# Patient Record
Sex: MALE | Race: WHITE | HISPANIC OR LATINO | Employment: OTHER | URBAN - METROPOLITAN AREA
[De-identification: names, ages, dates, MRNs, and addresses within clinical notes are randomized per-mention and may not be internally consistent; named-entity substitution may affect disease eponyms.]

---

## 2017-02-07 RX ORDER — BLOOD SUGAR DIAGNOSTIC
STRIP MISCELLANEOUS
Qty: 150 STRIP | Refills: 4 | Status: SHIPPED | OUTPATIENT
Start: 2017-02-07 | End: 2017-02-08 | Stop reason: CLARIF

## 2017-03-14 RX ORDER — LISINOPRIL 40 MG/1
TABLET ORAL
Qty: 90 TAB | Refills: 0 | Status: SHIPPED | OUTPATIENT
Start: 2017-03-14 | End: 2017-05-30 | Stop reason: SDUPTHER

## 2017-03-14 RX ORDER — PAROXETINE 30 MG/1
TABLET, FILM COATED ORAL
Qty: 90 TAB | Refills: 0 | Status: SHIPPED | OUTPATIENT
Start: 2017-03-14 | End: 2017-05-30 | Stop reason: SDUPTHER

## 2017-03-14 RX ORDER — HYDROCHLOROTHIAZIDE 25 MG/1
TABLET ORAL
Qty: 90 TAB | Refills: 1 | Status: SHIPPED | OUTPATIENT
Start: 2017-03-14 | End: 2017-10-31 | Stop reason: SDUPTHER

## 2017-03-14 NOTE — TELEPHONE ENCOUNTER
Refill X 6 months, sent to pharmacy.Pt. Seen in the last 6 months per protocol.   Lab Results   Component Value Date/Time    SODIUM 134* 08/12/2016 07:51 AM    POTASSIUM 3.7 08/12/2016 07:51 AM    CHLORIDE 99 08/12/2016 07:51 AM    CO2 28 08/12/2016 07:51 AM    GLUCOSE 323* 08/12/2016 07:51 AM    BUN 13 08/12/2016 07:51 AM    CREATININE 1.06 08/12/2016 07:51 AM

## 2017-03-14 NOTE — TELEPHONE ENCOUNTER
Was the patient seen in the last year in this department? Yes     Does patient have an active prescription for medications requested? No     Received Request Via: Pharmacy      Pt met protocol?: Yes    LAST OV 08/09/2016    BP Readings from Last 1 Encounters:   11/18/16 138/76

## 2017-03-14 NOTE — TELEPHONE ENCOUNTER
Was the patient seen in the last year in this department? Yes    Last office visit: 8/9/2016    Does patient have an active prescription for medications requested? No     Received Request Via: Pharmacy

## 2017-03-14 NOTE — TELEPHONE ENCOUNTER
Was the patient seen in the last year in this department? Yes     Does patient have an active prescription for medications requested? No     Received Request Via: Pharmacy      Pt met protocol?: Yes    LAST OV 08/09/16    BP Readings from Last 1 Encounters:   11/18/16 138/76

## 2017-03-23 ENCOUNTER — OFFICE VISIT (OUTPATIENT)
Dept: MEDICAL GROUP | Facility: PHYSICIAN GROUP | Age: 67
End: 2017-03-23
Payer: MEDICARE

## 2017-03-23 VITALS
SYSTOLIC BLOOD PRESSURE: 124 MMHG | HEART RATE: 96 BPM | OXYGEN SATURATION: 96 % | BODY MASS INDEX: 34.69 KG/M2 | WEIGHT: 221 LBS | TEMPERATURE: 97.2 F | HEIGHT: 67 IN | RESPIRATION RATE: 16 BRPM | DIASTOLIC BLOOD PRESSURE: 80 MMHG

## 2017-03-23 DIAGNOSIS — G47.33 OSA (OBSTRUCTIVE SLEEP APNEA): Chronic | ICD-10-CM

## 2017-03-23 DIAGNOSIS — E66.9 OBESITY (BMI 30.0-34.9): ICD-10-CM

## 2017-03-23 DIAGNOSIS — Z91.148 NON COMPLIANCE W MEDICATION REGIMEN: ICD-10-CM

## 2017-03-23 PROCEDURE — 99214 OFFICE O/P EST MOD 30 MIN: CPT | Performed by: NURSE PRACTITIONER

## 2017-03-23 PROCEDURE — G8484 FLU IMMUNIZE NO ADMIN: HCPCS | Performed by: NURSE PRACTITIONER

## 2017-03-23 PROCEDURE — G8432 DEP SCR NOT DOC, RNG: HCPCS | Performed by: NURSE PRACTITIONER

## 2017-03-23 PROCEDURE — G8417 CALC BMI ABV UP PARAM F/U: HCPCS | Performed by: NURSE PRACTITIONER

## 2017-03-23 PROCEDURE — 4040F PNEUMOC VAC/ADMIN/RCVD: CPT | Performed by: NURSE PRACTITIONER

## 2017-03-23 PROCEDURE — 1101F PT FALLS ASSESS-DOCD LE1/YR: CPT | Mod: 8P | Performed by: NURSE PRACTITIONER

## 2017-03-23 PROCEDURE — 3046F HEMOGLOBIN A1C LEVEL >9.0%: CPT | Mod: 8P | Performed by: NURSE PRACTITIONER

## 2017-03-23 PROCEDURE — 3017F COLORECTAL CA SCREEN DOC REV: CPT | Performed by: NURSE PRACTITIONER

## 2017-03-23 PROCEDURE — 1036F TOBACCO NON-USER: CPT | Performed by: NURSE PRACTITIONER

## 2017-03-23 NOTE — PROGRESS NOTES
Chief Complaint   Patient presents with   • Follow-Up     med       HISTORY OF PRESENT ILLNESS: Patient is a 66 y.o. male established patient who presents today to discuss the followin. Uncontrolled type 2 diabetes mellitus with insulin therapy (CMS-[facility])  Unfortunately patient has been neglectful in regards to his diabetes management.  He established with endocrinology in August and has yet to follow up with him as recommended.  His A1c remains very high at 11 point 6.  Patient states that he is checking his blood sugars daily and often gets fasting values in the high 2 hundreds.  He does mention that his insulin therapy was adjusted at his endocrinology appointment and is currently taking NPH 25 units twice a day and regular insulin 12-20 units with meals.  He denies any episodes of hypoglycemia.  He is up-to-date with eye exam, scheduled for routine check in September.    2. PATTI (obstructive sleep apnea)  Patient mentions that his sleep remains very disruptive.  He gets up several times per night and often eats.  Although he has been diagnosed with severe sleep apnea, he is not wearing any mask or device for treatment.  He states that he has been trying to get back into the pulmonologist but hasn't had any luck.  His weight has gone up actually 20 pounds since his last visit with myself.  His wife does report very disruptive sleep and snoring.  He often gets up to eat and drinks coffee several times in the middle of the night.    3. Obesity (BMI 30.0-34.9)    Allergies:Amoxicillin and Hydrocodone    Current Outpatient Prescriptions Ordered in [facility]   Medication Sig Dispense Refill   • hydrochlorothiazide (HYDRODIURIL) 25 MG Tab TAKE 1 TABLET BY MOUTH EVERY DAY 90 Tab 1   • lisinopril (PRINIVIL, ZESTRIL) 40 MG tablet TAKE 1 TABLET BY MOUTH EVERY DAY 90 Tab 0   • paroxetine (PAXIL) 30 MG Tab TAKE 1 TABLET BY MOUTH EVERY DAY WITH FOOD 90 Tab 0   • Blood Glucose Monitoring Suppl SUPPLIES Misc Test strips order:  "Test strips for Accucheck Marie meter.   Patient to check blood sugars 4 times per day 150 Each 6   • FREESTYLE LANCETS Misc Apply 1 Each to affected area(s) 5 Times a Day. 500 Each 11   • aspirin 81 MG tablet Take 81 mg by mouth every day.     • pramipexole (MIRAPEX) 1 MG Tab Take 1 mg by mouth 3 times a day.     • atorvastatin (LIPITOR) 40 MG Tab TAKE 1 TABLET EVERY DAY 90 Tab 3   • metformin ER 1000 MG TABLET SR 24 HR Take 1,000 mg by mouth 2 times a day. 180 Tab 2   • insulin NPH (HUMULIN,NOVOLIN) 100 UNIT/ML Suspension Inject 28 Units as instructed 2 Times a Day. 20 mL 5   • insulin regular (HUMULIN R) 100 Unit/mL Solution Inject 16 Units as instructed 3 times a day before meals. 20 mL 5   • Insulin Syringe-Needle U-100 (INSULIN SYRINGE .3CC/31GX5/16\") 31G X 5/16\" 0.3 ML Misc 1 Syringe by Does not apply route 5 Times a Day. 200 Each 5   • NON SPECIFIED 1 Each by Does not apply route See Admin Instructions. 1 Each 2     No current Epic-ordered facility-administered medications on file.       Past Medical History   Diagnosis Date   • Hypertension 4/16/2013   • Restless legs syndrome (RLS) 4/16/2013   • PATTI (obstructive sleep apnea) 4/16/2013     Cannot tolerate sleep apnea   • Low testosterone 4/16/2013   • Anxiety 4/16/2013     As needed for anxiety       Social History   Substance Use Topics   • Smoking status: Never Smoker    • Smokeless tobacco: Not on file   • Alcohol Use: Yes      Comment: 1-2 per week       No family status information on file.     Family History   Problem Relation Age of Onset   • Diabetes Mother    • Diabetes Father    • Diabetes Sister    • Heart Disease Neg Hx    • Heart Failure Neg Hx        ROS: see above    Review of Systems   Constitutional: Negative for fever, chills, weight loss and malaise/fatigue.   HENT: Negative for ear pain, nosebleeds, congestion, sore throat and neck pain.    Eyes: Negative for blurred vision.   Respiratory: Negative for cough, sputum production, shortness " "of breath and wheezing.    Cardiovascular: Negative for chest pain, palpitations, orthopnea and leg swelling.   Gastrointestinal: Negative for heartburn, nausea, vomiting and abdominal pain.   Genitourinary: Negative for dysuria, urgency and frequency.   Musculoskeletal: Negative for myalgias, back pain and joint pain.   Skin: Negative for rash and itching.   Neurological: Negative for dizziness, tingling, tremors, sensory change, focal weakness and headaches.   Endo/Heme/Allergies: Does not bruise/bleed easily.   Psychiatric/Behavioral: Negative for depression, suicidal ideas and memory loss.  The patient is not nervous/anxious and does not have insomnia.        Exam:  Blood pressure 124/80, pulse 96, temperature 36.2 °C (97.2 °F), resp. rate 16, height 1.702 m (5' 7.01\"), weight 100.245 kg (221 lb), SpO2 96 %.  General:  Well nourished, well developed male in NAD  Head is grossly normal.  Neck: Thyroid is not enlarged.  Pulmonary: Normal effort.   Cardiovascular: Regular rate.   Extremities: no clubbing, cyanosis, or edema.  Psych:  Mood and affect are normal.  Answering questions appropriately with good eye contact.      Please note that this dictation was created using voice recognition software. I have made every reasonable attempt to correct obvious errors, but I expect that there are errors of grammar and possibly content that I did not discover before finalizing the note.    Assessment/Plan:    1. Uncontrolled type 2 diabetes mellitus with insulin therapy (CMS-[facility])  LIPID PROFILE    HEMOGLOBIN A1C    COMP METABOLIC PANEL    MICROALBUMIN CREAT RATIO URINE   2. PATTI (obstructive sleep apnea)  REFERRAL TO SLEEP STUDIES   3. Obesity (BMI 30.0-34.9)  LIPID PROFILE    HEMOGLOBIN A1C    COMP METABOLIC PANEL    MICROALBUMIN CREAT RATIO URINE    REFERRAL TO SLEEP STUDIES   4. Non compliance w medication regimen          1.  Patient is very nonchalant and casual in regards to his poor health.  He has multiple excuses " as to why his sugars are high and why he has not followed up as recommended.  2.  Patient states that he will call the endocrinologist to make a follow-up appointment.  Fasting lab orders were given for him to have drawn prior to this appointment.  3.  Referral back to sleep study, patient is due for repeat testing and to be fitted for new appliance.  4.  Return to clinic in 3 months, sooner if needed.

## 2017-03-23 NOTE — MR AVS SNAPSHOT
"Carlos Pulido   3/23/2017 10:20 AM   Office Visit   MRN: [id number]    Department:  [facility]   Dept Phone:  [phone]    Description:  Male : 1950   Provider:  MERARY Morillo           Reason for Visit     Follow-Up med      Allergies as of 3/23/2017     Allergen Noted Reactions    Amoxicillin 2013       Hydrocodone 2013         You were diagnosed with     Uncontrolled type 2 diabetes mellitus with insulin therapy (CMS-HCC)   [670607]  -  Primary     PATTI (obstructive sleep apnea)   [608892]       Obesity (BMI 30.0-34.9)   [828798]       Non compliance w medication regimen   [664780]         Vital Signs     Blood Pressure Pulse Temperature Respirations Height Weight    124/80 mmHg 96 36.2 °C (97.2 °F) 16 1.702 m (5' 7.01\") 100.245 kg (221 lb)    Body Mass Index Oxygen Saturation Smoking Status             34.61 kg/m2 96% Never Smoker          Basic Information     Date Of Birth Sex Race Ethnicity Preferred Language    1950 Male  or   Origin (Syriac,Faroese,Malian,Haitian, etc) English      Problem List              ICD-10-CM Priority Class Noted - Resolved    Hypertension (Chronic) I10   2013 - Present    Restless legs syndrome (RLS) G25.81   2013 - Present    PATTI (obstructive sleep apnea) (Chronic) G47.33   2013 - Present    Low testosterone E29.1   2013 - Present    Anxiety (Chronic) F41.9   2013 - Present    S/P knee replacement Z96.659   2013 - Present    External hemorrhoid, bleeding K64.4   2015 - Present    Chronic insomnia F51.04   2015 - Present    Obesity (BMI 30.0-34.9) E66.9   2015 - Present    Uncontrolled type 2 diabetes mellitus with insulin therapy (CMS-HCC) E11.65, Z79.4   3/23/2017 - Present    Non compliance w medication regimen Z91.14   3/23/2017 - Present      Health Maintenance        Date Due Completion Dates    IMM DTaP/Tdap/Td Vaccine (1 - Tdap) 1968 ---    IMM " PNEUMOCOCCAL 65+ (ADULT) LOW/MEDIUM RISK SERIES (2 of 2 - PCV13) 7/6/2016 7/6/2015    IMM INFLUENZA (1) 9/1/2016 ---    RETINAL SCREENING 9/15/2016 9/15/2015 (Done), 3/16/2012 (Done)    Override on 9/15/2015: Done    Override on 3/16/2012: Done ([place]    A1C SCREENING 2/12/2017 8/12/2016, 10/23/2015, 7/6/2015, 8/14/2013    DIABETES MONOFILAMENT / LE EXAM 8/9/2017 8/9/2016, 4/16/2013 (Done)    Override on 4/16/2013: Done    FASTING LIPID PROFILE 8/12/2017 8/12/2016, 10/23/2015, 7/6/2015, 8/14/2013    URINE ACR / MICROALBUMIN 8/12/2017 8/12/2016, 7/6/2015, 8/14/2013    SERUM CREATININE 8/12/2017 8/12/2016, 10/23/2015, 7/6/2015, 8/14/2013    COLONOSCOPY 4/16/2021 4/16/2011 (Done)    Override on 4/16/2011: Done ([place])            Current Immunizations     Pneumococcal polysaccharide vaccine (PPSV-23) 7/6/2015    SHINGLES VACCINE 7/6/2015      Below and/or attached are the medications your provider expects you to take. Review all of your home medications and newly ordered medications with your provider and/or pharmacist. Follow medication instructions as directed by your provider and/or pharmacist. Please keep your medication list with you and share with your provider. Update the information when medications are discontinued, doses are changed, or new medications (including over-the-counter products) are added; and carry medication information at all times in the event of emergency situations     Allergies:  AMOXICILLIN - (reactions not documented)     HYDROCODONE - (reactions not documented)               Medications  Valid as of: March 23, 2017 - 10:48 AM    Generic Name Brand Name Tablet Size Instructions for use    Aspirin (Tab) aspirin 81 MG Take 81 mg by mouth every day.        Atorvastatin Calcium (Tab) LIPITOR 40 MG TAKE 1 TABLET EVERY DAY        Blood Glucose Monitoring Suppl (Misc) Blood Glucose Monitoring Suppl SUPPLIES Test strips order: Test strips for Accucheck Marie meter.   Patient to check blood  "sugars 4 times per day        HydroCHLOROthiazide (Tab) HYDRODIURIL 25 MG TAKE 1 TABLET BY MOUTH EVERY DAY        Insulin NPH Human (Isophane) (Suspension) HUMULIN,NOVOLIN 100 UNIT/ML Inject 28 Units as instructed 2 Times a Day.        Insulin Regular Human (Solution) HUMULIN R 100 Unit/mL Inject 16 Units as instructed 3 times a day before meals.        Insulin Syringe-Needle U-100 (Misc) INSULIN SYRINGE .3CC/31GX5/16\" 31G X 5/16\" 0.3 ML 1 Syringe by Does not apply route 5 Times a Day.        Lancets (Misc) FREESTYLE LANCETS  Apply 1 Each to affected area(s) 5 Times a Day.        Lisinopril (Tab) PRINIVIL, ZESTRIL 40 MG TAKE 1 TABLET BY MOUTH EVERY DAY        MetFORMIN HCl (TABLET SR 24 HR) Metformin HCl 1000 MG Take 1,000 mg by mouth 2 times a day.        NON SPECIFIED   1 Each by Does not apply route See Admin Instructions.        PARoxetine HCl (Tab) PAXIL 30 MG TAKE 1 TABLET BY MOUTH EVERY DAY WITH FOOD        Pramipexole Dihydrochloride (Tab) MIRAPEX 1 MG Take 1 mg by mouth 3 times a day.        .                 Medicines prescribed today were sent to:     [facility] [address] [place] [address] [address] [address]    Phone: [phone] Fax: [phone]    Open 24 Hours?: No    [facility] [address] [place] [address] AT [address] [address]    Phone: [phone] Fax: [phone]    Open 24 Hours?: No      Medication refill instructions:       If your prescription bottle indicates you have medication refills left, it is not necessary to call your provider’s office. Please contact your pharmacy and they will refill your medication.    If your prescription bottle indicates you do not have any refills left, you may request refills at any time through one of the following ways: The online [employer] system (except Urgent Care), by calling your provider’s office, or by asking your pharmacy to contact " your provider’s office with a refill request. Medication refills are processed only during regular business hours and may not be available until the next business day. Your provider may request additional information or to have a follow-up visit with you prior to refilling your medication.   *Please Note: Medication refills are assigned a new Rx number when refilled electronically. Your pharmacy may indicate that no refills were authorized even though a new prescription for the same medication is available at the pharmacy. Please request the medicine by name with the pharmacy before contacting your provider for a refill.        Your To Do List     Future Labs/Procedures Complete By Expires    COMP METABOLIC PANEL  As directed 3/23/2018    HEMOGLOBIN A1C  As directed 3/23/2018    LIPID PROFILE  As directed 3/23/2018    MICROALBUMIN CREAT RATIO URINE  As directed 3/23/2018      Referral     A referral request has been sent to our patient care coordination department. Please allow 3-5 business days for us to process this request and contact you either by phone or mail. If you do not hear from us by the 5th business day, please call us at [phone].           [employer] Access Code: Activation code not generated  Current [employer] Status: Active

## 2017-04-05 ENCOUNTER — OFFICE VISIT (OUTPATIENT)
Dept: MEDICAL GROUP | Facility: PHYSICIAN GROUP | Age: 67
End: 2017-04-05
Payer: MEDICARE

## 2017-04-05 VITALS
HEIGHT: 67 IN | BODY MASS INDEX: 34.37 KG/M2 | HEART RATE: 95 BPM | SYSTOLIC BLOOD PRESSURE: 160 MMHG | TEMPERATURE: 99.5 F | RESPIRATION RATE: 16 BRPM | OXYGEN SATURATION: 96 % | DIASTOLIC BLOOD PRESSURE: 90 MMHG | WEIGHT: 219 LBS

## 2017-04-05 DIAGNOSIS — I10 ESSENTIAL HYPERTENSION: ICD-10-CM

## 2017-04-05 DIAGNOSIS — G47.33 OSA (OBSTRUCTIVE SLEEP APNEA): Chronic | ICD-10-CM

## 2017-04-05 DIAGNOSIS — R31.0 HEMATURIA, GROSS: ICD-10-CM

## 2017-04-05 DIAGNOSIS — S39.012A STRAIN OF LUMBAR PARASPINAL MUSCLE, INITIAL ENCOUNTER: Primary | ICD-10-CM

## 2017-04-05 LAB
APPEARANCE UR: NORMAL
BILIRUB UR STRIP-MCNC: NEGATIVE MG/DL
COLOR UR AUTO: YELLOW
GLUCOSE UR STRIP.AUTO-MCNC: NEGATIVE MG/DL
KETONES UR STRIP.AUTO-MCNC: NEGATIVE MG/DL
LEUKOCYTE ESTERASE UR QL STRIP.AUTO: NEGATIVE
NITRITE UR QL STRIP.AUTO: NEGATIVE
PH UR STRIP.AUTO: 6 [PH] (ref 5–8)
PROT UR QL STRIP: NEGATIVE MG/DL
RBC UR QL AUTO: NORMAL
SP GR UR STRIP.AUTO: 1.01
UROBILINOGEN UR STRIP-MCNC: NEGATIVE MG/DL

## 2017-04-05 PROCEDURE — 81002 URINALYSIS NONAUTO W/O SCOPE: CPT | Performed by: NURSE PRACTITIONER

## 2017-04-05 PROCEDURE — 4040F PNEUMOC VAC/ADMIN/RCVD: CPT | Performed by: NURSE PRACTITIONER

## 2017-04-05 PROCEDURE — 1101F PT FALLS ASSESS-DOCD LE1/YR: CPT | Performed by: NURSE PRACTITIONER

## 2017-04-05 PROCEDURE — 3017F COLORECTAL CA SCREEN DOC REV: CPT | Performed by: NURSE PRACTITIONER

## 2017-04-05 PROCEDURE — 99214 OFFICE O/P EST MOD 30 MIN: CPT | Performed by: NURSE PRACTITIONER

## 2017-04-05 PROCEDURE — G8432 DEP SCR NOT DOC, RNG: HCPCS | Performed by: NURSE PRACTITIONER

## 2017-04-05 PROCEDURE — 1036F TOBACCO NON-USER: CPT | Performed by: NURSE PRACTITIONER

## 2017-04-05 PROCEDURE — G8417 CALC BMI ABV UP PARAM F/U: HCPCS | Performed by: NURSE PRACTITIONER

## 2017-04-05 RX ORDER — AMLODIPINE BESYLATE 5 MG/1
5 TABLET ORAL DAILY
Qty: 30 TAB | Refills: 3 | Status: SHIPPED | OUTPATIENT
Start: 2017-04-05 | End: 2017-07-24 | Stop reason: SDUPTHER

## 2017-04-05 ASSESSMENT — PAIN SCALES - GENERAL: PAINLEVEL: 5=MODERATE PAIN

## 2017-04-05 ASSESSMENT — PATIENT HEALTH QUESTIONNAIRE - PHQ9: CLINICAL INTERPRETATION OF PHQ2 SCORE: 0

## 2017-04-05 NOTE — PROGRESS NOTES
Chief Complaint   Patient presents with   • Back Pain     referral /chiropractor, x 2 weeks   • Other     blood in urine, x 1 day       HISTORY OF PRESENT ILLNESS: Patient is a 66 y.o. male established patient who presents today to discuss the followin. Strain of lumbar paraspinal muscle, initial encounter  Patient is an approximate 2 weeks ago he was participating in a home project when he hurt his low back.  He denies any radicular symptoms.  He states that overall the pain is improving but he would like to explore potential chiropractic visit.  He states that his wife was recently seen and has been feeling great in regards to her chronic low back pain.  He denies any incontinence.  He has not been taking any over-the-counter medications.    2. Hematuria, gross  Patient reports one episode of blood in his urine.  He denies any personal history of previous episodes.  Denies any dysuria, fever, chills or abdominal pain.  He denies any difficulty stopping or starting his urinary stream.  He is a nonsmoker.  Denies any known family history of bladder cancer.    3. Essential hypertension  Patient's blood pressure is significantly elevated today despite taking lisinopril 40 mg and hydrochlorothiazide 25 mg.  He states that over the past couple of days he has been taking 2 lisinopril because of the elevation.  He has been monitoring his blood pressure at home and often gets systolic values above 150.  He denies any headaches, blurry vision or chest pain.    4. PATTI (obstructive sleep apnea)  Patient reminds me that he is not adequately treating his sleep apnea.  He states that 2 nights ago he started toward the dental device, but reports some discomfort.  He is scheduled for recheck with the pulmonologist at the end of next month.  He states that he is not able to tolerate the CPAP machine.  He denies any known family history of heart failure.    Allergies:Amoxicillin and Hydrocodone    Current Outpatient  "Prescriptions Ordered in Epic   Medication Sig Dispense Refill   • amlodipine (NORVASC) 5 MG Tab Take 1 Tab by mouth every day. 30 Tab 3   • hydrochlorothiazide (HYDRODIURIL) 25 MG Tab TAKE 1 TABLET BY MOUTH EVERY DAY 90 Tab 1   • lisinopril (PRINIVIL, ZESTRIL) 40 MG tablet TAKE 1 TABLET BY MOUTH EVERY DAY 90 Tab 0   • paroxetine (PAXIL) 30 MG Tab TAKE 1 TABLET BY MOUTH EVERY DAY WITH FOOD 90 Tab 0   • Blood Glucose Monitoring Suppl SUPPLIES Misc Test strips order: Test strips for Accucheck Marie meter.   Patient to check blood sugars 4 times per day 150 Each 6   • FREESTYLE LANCETS Misc Apply 1 Each to affected area(s) 5 Times a Day. 500 Each 11   • aspirin 81 MG tablet Take 81 mg by mouth every day.     • pramipexole (MIRAPEX) 1 MG Tab Take 1 mg by mouth 3 times a day.     • atorvastatin (LIPITOR) 40 MG Tab TAKE 1 TABLET EVERY DAY 90 Tab 3   • metformin ER 1000 MG TABLET SR 24 HR Take 1,000 mg by mouth 2 times a day. 180 Tab 2   • insulin NPH (HUMULIN,NOVOLIN) 100 UNIT/ML Suspension Inject 28 Units as instructed 2 Times a Day. 20 mL 5   • insulin regular (HUMULIN R) 100 Unit/mL Solution Inject 16 Units as instructed 3 times a day before meals. 20 mL 5   • Insulin Syringe-Needle U-100 (INSULIN SYRINGE .3CC/31GX5/16\") 31G X 5/16\" 0.3 ML Misc 1 Syringe by Does not apply route 5 Times a Day. 200 Each 5   • NON SPECIFIED 1 Each by Does not apply route See Admin Instructions. 1 Each 2     No current Epic-ordered facility-administered medications on file.       Past Medical History   Diagnosis Date   • Hypertension 4/16/2013   • Restless legs syndrome (RLS) 4/16/2013   • PATTI (obstructive sleep apnea) 4/16/2013     Cannot tolerate sleep apnea   • Low testosterone 4/16/2013   • Anxiety 4/16/2013     As needed for anxiety       Social History   Substance Use Topics   • Smoking status: Never Smoker    • Smokeless tobacco: Never Used   • Alcohol Use: 0.0 oz/week     0 Standard drinks or equivalent per week      Comment: 1-2 " "per week       No family status information on file.     Family History   Problem Relation Age of Onset   • Diabetes Mother    • Diabetes Father    • Diabetes Sister    • Heart Disease Neg Hx    • Heart Failure Neg Hx        ROS: see above    Review of Systems   Constitutional: Negative for fever, chills, weight loss and malaise/fatigue.   HENT: Negative for ear pain, nosebleeds, congestion, sore throat and neck pain.    Eyes: Negative for blurred vision.   Respiratory: Negative for cough, sputum production, shortness of breath and wheezing.    Cardiovascular: Negative for chest pain, palpitations, orthopnea and leg swelling.   Gastrointestinal: Negative for heartburn, nausea, vomiting and abdominal pain.   Genitourinary: Negative for dysuria, urgency and frequency.   Musculoskeletal: Negative for myalgias, back pain and joint pain.   Skin: Negative for rash and itching.   Neurological: Negative for dizziness, tingling, tremors, sensory change, focal weakness and headaches.   Endo/Heme/Allergies: Does not bruise/bleed easily.   Psychiatric/Behavioral: Negative for depression, suicidal ideas and memory loss.  The patient is not nervous/anxious and does not have insomnia.        Exam:  Blood pressure 160/90, pulse 95, temperature 37.5 °C (99.5 °F), resp. rate 16, height 1.702 m (5' 7.01\"), weight 99.338 kg (219 lb), SpO2 96 %.  General:  Well nourished, well developed male in NAD  Head is grossly normal.  Neck: Thyroid is not enlarged.  Pulmonary: Normal effort.   Cardiovascular: Regular rate.   Extremities: no clubbing, cyanosis, or edema.  Psych:  Mood and affect are normal.  Answering questions appropriately with good eye contact.      Please note that this dictation was created using voice recognition software. I have made every reasonable attempt to correct obvious errors, but I expect that there are errors of grammar and possibly content that I did not discover before finalizing the " note.    Assessment/Plan:    1. Strain of lumbar paraspinal muscle, initial encounter  REFERRAL TO CHIROPRACTIC   2. Hematuria, gross  POCT Urinalysis    REFERRAL TO UROLOGY   3. Essential hypertension  amlodipine (NORVASC) 5 MG Tab    ECHOCARDIOGRAM COMP W/O CONT   4. PATTI (obstructive sleep apnea)  ECHOCARDIOGRAM COMP W/O CONT        1.  Strongly encouraged patient to reconsider wearing a CPAP.  This is likely causing his persistent blood pressure elevation.  In the meantime, add Norvasc 5 mg to current regimen.  Continue to monitor blood pressure at home.  2.  Referral to urology for further evaluation in regards to pam hematuria  3.  Update  ECHO with continued hypertension and untreated sleep apnea   4.  Patient is overdue for fasting labs, patient states that he will try to have them drawn tomorrow morning  5.  Return to clinic after labs for review and blood pressure check

## 2017-04-05 NOTE — MR AVS SNAPSHOT
"        Carlos Pulido   2017 1:00 PM   Office Visit   MRN: [id number]    Department:  [facility]   Dept Phone:  [phone]    Description:  Male : 1950   Provider:  MERARY Morillo           Reason for Visit     Back Pain referral /chiropractor, x 2 weeks    Other blood in urine, x 1 day      Allergies as of 2017     Allergen Noted Reactions    Amoxicillin 2013       Hydrocodone 2013         You were diagnosed with     Strain of lumbar paraspinal muscle, initial encounter   [648848]  -  Primary     Hematuria, gross   [753142]       Essential hypertension   [2852123]       PATTI (obstructive sleep apnea)   [254173]         Vital Signs     Blood Pressure Pulse Temperature Respirations Height Weight    160/90 mmHg 95 37.5 °C (99.5 °F) 16 1.702 m (5' 7.01\") 99.338 kg (219 lb)    Body Mass Index Oxygen Saturation Smoking Status             34.29 kg/m2 96% Never Smoker          Basic Information     Date Of Birth Sex Race Ethnicity Preferred Language    1950 Male  or   Origin (Welsh,Maldivian,Zambian,Panamanian, etc) English      Your appointments     May 30, 2017 11:20 AM   New Patient with [facility] Sleep Medicine (--)    [address] [place] [address]  [place] [id number] [phone]           Please bring enclosed paperwork completed along with your insurance card and photo ID.              Problem List              ICD-10-CM Priority Class Noted - Resolved    Hypertension (Chronic) I10   2013 - Present    Restless legs syndrome (RLS) G25.81   2013 - Present    PATTI (obstructive sleep apnea) (Chronic) G47.33   2013 - Present    Low testosterone E29.1   2013 - Present    Anxiety (Chronic) F41.9   2013 - Present    S/P knee replacement Z96.659   2013 - Present    External hemorrhoid, bleeding K64.4   2015 - Present    Chronic insomnia F51.04   2015 - Present    Obesity (BMI 30.0-34.9) " E66.9   9/23/2015 - Present    Uncontrolled type 2 diabetes mellitus with insulin therapy (CMS-[facility]) E11.65, Z79.4   3/23/2017 - Present    Non compliance w medication regimen Z91.14   3/23/2017 - Present      Health Maintenance        Date Due Completion Dates    IMM DTaP/Tdap/Td Vaccine (1 - Tdap) 7/5/1968 ---    IMM PNEUMOCOCCAL 65+ (ADULT) LOW/MEDIUM RISK SERIES (2 of 2 - PCV13) 7/6/2016 7/6/2015    RETINAL SCREENING 9/15/2016 9/15/2015 (Done), 3/16/2012 (Done)    Override on 9/15/2015: Done    [place] on 3/16/2012: [place] ([place]    A1C SCREENING 2/12/2017 8/12/2016, 10/23/2015, 7/6/2015, 8/14/2013    DIABETES MONOFILAMENT / LE EXAM 8/9/2017 8/9/2016, 4/16/2013 (Done)    Override on 4/16/2013: Done    FASTING LIPID PROFILE 8/12/2017 8/12/2016, 10/23/2015, 7/6/2015, 8/14/2013    URINE ACR / MICROALBUMIN 8/12/2017 8/12/2016, 7/6/2015, 8/14/2013    SERUM CREATININE 8/12/2017 8/12/2016, 10/23/2015, 7/6/2015, 8/14/2013    COLONOSCOPY 4/16/2021 4/16/2011 (Done)    Override on 4/16/2011: Done ([facility])            Current Immunizations     Pneumococcal polysaccharide vaccine (PPSV-23) 7/6/2015    SHINGLES VACCINE 7/6/2015      Below and/or attached are the medications your provider expects you to take. Review all of your home medications and newly ordered medications with your provider and/or pharmacist. Follow medication instructions as directed by your provider and/or pharmacist. Please keep your medication list with you and share with your provider. Update the information when medications are discontinued, doses are changed, or new medications (including over-the-counter products) are added; and carry medication information at all times in the event of emergency situations     Allergies:  AMOXICILLIN - (reactions not documented)     HYDROCODONE - (reactions not documented)               Medications  Valid as of: April 05, 2017 -  1:59 PM    Generic Name Brand Name Tablet Size Instructions for use    AmLODIPine  "Besylate (Tab) NORVASC 5 MG Take 1 Tab by mouth every day.        Aspirin (Tab) aspirin 81 MG Take 81 mg by mouth every day.        Atorvastatin Calcium (Tab) LIPITOR 40 MG TAKE 1 TABLET EVERY DAY        Blood Glucose Monitoring Suppl (Misc) Blood Glucose Monitoring Suppl SUPPLIES Test strips order: Test strips for Accucheck Marie meter.   Patient to check blood sugars 4 times per day        HydroCHLOROthiazide (Tab) HYDRODIURIL 25 MG TAKE 1 TABLET BY MOUTH EVERY DAY        Insulin NPH Human (Isophane) (Suspension) HUMULIN,NOVOLIN 100 UNIT/ML Inject 28 Units as instructed 2 Times a Day.        Insulin Regular Human (Solution) HUMULIN R 100 Unit/mL Inject 16 Units as instructed 3 times a day before meals.        Insulin Syringe-Needle U-100 (Misc) INSULIN SYRINGE .3CC/31GX5/16\" 31G X 5/16\" 0.3 ML 1 Syringe by Does not apply route 5 Times a Day.        Lancets ([facility] FREESTYLE LANCETS  Apply 1 Each to affected area(s) 5 Times a Day.        Lisinopril (Tab) PRINIVIL, ZESTRIL 40 MG TAKE 1 TABLET BY MOUTH EVERY DAY        MetFORMIN HCl (TABLET SR 24 HR) Metformin HCl 1000 MG Take 1,000 mg by mouth 2 times a day.        NON SPECIFIED   1 Each by Does not apply route See Admin Instructions.        PARoxetine HCl (Tab) PAXIL 30 MG TAKE 1 TABLET BY MOUTH EVERY DAY WITH FOOD        Pramipexole Dihydrochloride (Tab) MIRAPEX 1 MG Take 1 mg by mouth 3 times a day.        .                 Medicines prescribed today were sent to:     [facility] [place] - [address] [address]    Phone: [phone] Fax: [phone]    Open 24 Hours?: No    [facility] DRUG STORE [address] [place] [address] [place] [address] AT [address] [address]    Phone: [phone] Fax: [phone]    Open 24 Hours?: No      Medication refill instructions:       If your prescription bottle indicates you have medication refills left, it is not necessary to " call your provider’s office. Please contact your pharmacy and they will refill your medication.    If your prescription bottle indicates you do not have any refills left, you may request refills at any time through one of the following ways: The online [employer] system (except Urgent Care), by calling your provider’s office, or by asking your pharmacy to contact your provider’s office with a refill request. Medication refills are processed only during regular business hours and may not be available until the next business day. Your provider may request additional information or to have a follow-up visit with you prior to refilling your medication.   *Please Note: Medication refills are assigned a new Rx number when refilled electronically. Your pharmacy may indicate that no refills were authorized even though a new prescription for the same medication is available at the pharmacy. Please request the medicine by name with the pharmacy before contacting your provider for a refill.        Your To Do List     Future Labs/Procedures Complete By Expires    ECHOCARDIOGRAM COMP W/O CONT  As directed 4/5/2018      Referral     A referral request has been sent to our patient care coordination department. Please allow 3-5 business days for us to process this request and contact you either by phone or mail. If you do not hear from us by the 5th business day, please call us at [phone].           [employer] Access Code: Activation code not generated  Current [employer] Status: Active

## 2017-04-07 ENCOUNTER — HOSPITAL ENCOUNTER (OUTPATIENT)
Dept: LAB | Facility: MEDICAL CENTER | Age: 67
End: 2017-04-07
Attending: NURSE PRACTITIONER
Payer: MEDICARE

## 2017-04-07 DIAGNOSIS — E66.9 OBESITY (BMI 30.0-34.9): ICD-10-CM

## 2017-04-07 LAB
ALBUMIN SERPL BCP-MCNC: 4.2 G/DL (ref 3.2–4.9)
ALBUMIN/GLOB SERPL: 1.4 G/DL
ALP SERPL-CCNC: 87 U/L (ref 30–99)
ALT SERPL-CCNC: 14 U/L (ref 2–50)
ANION GAP SERPL CALC-SCNC: 9 MMOL/L (ref 0–11.9)
AST SERPL-CCNC: 17 U/L (ref 12–45)
BILIRUB SERPL-MCNC: 0.6 MG/DL (ref 0.1–1.5)
BUN SERPL-MCNC: 19 MG/DL (ref 8–22)
CALCIUM SERPL-MCNC: 9.4 MG/DL (ref 8.5–10.5)
CHLORIDE SERPL-SCNC: 100 MMOL/L (ref 96–112)
CHOLEST SERPL-MCNC: 152 MG/DL (ref 100–199)
CO2 SERPL-SCNC: 27 MMOL/L (ref 20–33)
CREAT SERPL-MCNC: 1.29 MG/DL (ref 0.5–1.4)
CREAT UR-MCNC: 99.2 MG/DL
EST. AVERAGE GLUCOSE BLD GHB EST-MCNC: 229 MG/DL
GFR SERPL CREATININE-BSD FRML MDRD: 55 ML/MIN/1.73 M 2
GLOBULIN SER CALC-MCNC: 3.1 G/DL (ref 1.9–3.5)
GLUCOSE SERPL-MCNC: 102 MG/DL (ref 65–99)
HBA1C MFR BLD: 9.6 % (ref 0–5.6)
HDLC SERPL-MCNC: 44 MG/DL
LDLC SERPL CALC-MCNC: 78 MG/DL
MICROALBUMIN UR-MCNC: 96 MG/DL
MICROALBUMIN/CREAT UR: 968 MG/G (ref 0–30)
POTASSIUM SERPL-SCNC: 3.4 MMOL/L (ref 3.6–5.5)
PROT SERPL-MCNC: 7.3 G/DL (ref 6–8.2)
SODIUM SERPL-SCNC: 136 MMOL/L (ref 135–145)
TRIGL SERPL-MCNC: 149 MG/DL (ref 0–149)

## 2017-04-07 PROCEDURE — 83036 HEMOGLOBIN GLYCOSYLATED A1C: CPT

## 2017-04-07 PROCEDURE — 80061 LIPID PANEL: CPT

## 2017-04-07 PROCEDURE — 80053 COMPREHEN METABOLIC PANEL: CPT

## 2017-04-07 PROCEDURE — 36415 COLL VENOUS BLD VENIPUNCTURE: CPT

## 2017-04-07 PROCEDURE — 82043 UR ALBUMIN QUANTITATIVE: CPT

## 2017-04-07 PROCEDURE — 82570 ASSAY OF URINE CREATININE: CPT

## 2017-04-10 ENCOUNTER — TELEPHONE (OUTPATIENT)
Dept: MEDICAL GROUP | Facility: PHYSICIAN GROUP | Age: 67
End: 2017-04-10

## 2017-04-10 NOTE — TELEPHONE ENCOUNTER
----- Message from MERARY Morillo sent at 4/10/2017  6:48 AM PDT -----  Diabetes remains out of control, although with some improvement. Please remind him to schedule with the endocrinologist as soon as possible.   Thank you

## 2017-05-03 RX ORDER — HYDROCHLOROTHIAZIDE 25 MG/1
25 TABLET ORAL
Qty: 90 TAB | Refills: 1 | OUTPATIENT
Start: 2017-05-03

## 2017-05-03 NOTE — TELEPHONE ENCOUNTER
Patient requests change in pharmacy.   [facility] - [place], [place] - [address] [facility]  [address] [facility] [address]  Phone: [phone] Fax: [phone]

## 2017-05-03 NOTE — TELEPHONE ENCOUNTER
Was the patient seen in the last year in this department? Yes     Does patient have an active prescription for medications requested? Yes pharmacy change    Received Request Via: Pharmacy

## 2017-05-03 NOTE — TELEPHONE ENCOUNTER
Called [facility] pt has an active prescription that has been filled prior so they shouldn't have an issue transferring it in i let them know which [facility] to call

## 2017-05-16 ENCOUNTER — HOSPITAL ENCOUNTER (OUTPATIENT)
Dept: RADIOLOGY | Facility: MEDICAL CENTER | Age: 67
End: 2017-05-16
Attending: NURSE PRACTITIONER
Payer: MEDICARE

## 2017-05-16 ENCOUNTER — OFFICE VISIT (OUTPATIENT)
Dept: MEDICAL GROUP | Facility: PHYSICIAN GROUP | Age: 67
End: 2017-05-16
Payer: MEDICARE

## 2017-05-16 VITALS
TEMPERATURE: 98.7 F | RESPIRATION RATE: 16 BRPM | DIASTOLIC BLOOD PRESSURE: 80 MMHG | OXYGEN SATURATION: 97 % | SYSTOLIC BLOOD PRESSURE: 140 MMHG | WEIGHT: 216 LBS | HEART RATE: 88 BPM | HEIGHT: 67 IN | BODY MASS INDEX: 33.9 KG/M2

## 2017-05-16 DIAGNOSIS — M25.561 BILATERAL CHRONIC KNEE PAIN: Primary | ICD-10-CM

## 2017-05-16 DIAGNOSIS — Z96.659 S/P KNEE REPLACEMENT: ICD-10-CM

## 2017-05-16 DIAGNOSIS — G89.29 BILATERAL CHRONIC KNEE PAIN: ICD-10-CM

## 2017-05-16 DIAGNOSIS — M25.561 BILATERAL CHRONIC KNEE PAIN: ICD-10-CM

## 2017-05-16 DIAGNOSIS — M25.562 BILATERAL CHRONIC KNEE PAIN: Primary | ICD-10-CM

## 2017-05-16 DIAGNOSIS — G89.29 BILATERAL CHRONIC KNEE PAIN: Primary | ICD-10-CM

## 2017-05-16 DIAGNOSIS — M25.562 BILATERAL CHRONIC KNEE PAIN: ICD-10-CM

## 2017-05-16 DIAGNOSIS — E66.9 OBESITY (BMI 30.0-34.9): ICD-10-CM

## 2017-05-16 PROCEDURE — G8432 DEP SCR NOT DOC, RNG: HCPCS | Performed by: NURSE PRACTITIONER

## 2017-05-16 PROCEDURE — 3017F COLORECTAL CA SCREEN DOC REV: CPT | Performed by: NURSE PRACTITIONER

## 2017-05-16 PROCEDURE — 99213 OFFICE O/P EST LOW 20 MIN: CPT | Performed by: NURSE PRACTITIONER

## 2017-05-16 PROCEDURE — 73560 X-RAY EXAM OF KNEE 1 OR 2: CPT | Mod: LT

## 2017-05-16 PROCEDURE — 1101F PT FALLS ASSESS-DOCD LE1/YR: CPT | Performed by: NURSE PRACTITIONER

## 2017-05-16 PROCEDURE — 73560 X-RAY EXAM OF KNEE 1 OR 2: CPT | Mod: RT

## 2017-05-16 PROCEDURE — 1036F TOBACCO NON-USER: CPT | Performed by: NURSE PRACTITIONER

## 2017-05-16 PROCEDURE — G8417 CALC BMI ABV UP PARAM F/U: HCPCS | Performed by: NURSE PRACTITIONER

## 2017-05-16 PROCEDURE — 73565 X-RAY EXAM OF KNEES: CPT

## 2017-05-16 PROCEDURE — 3046F HEMOGLOBIN A1C LEVEL >9.0%: CPT | Performed by: NURSE PRACTITIONER

## 2017-05-16 PROCEDURE — 4040F PNEUMOC VAC/ADMIN/RCVD: CPT | Performed by: NURSE PRACTITIONER

## 2017-05-16 NOTE — MR AVS SNAPSHOT
"Carlos Pulido   2017 9:40 AM   Office Visit   MRN: [id number]    Department:  [facility]   Dept Phone:  [phone]    Description:  Male : 1950   Provider:  MERARY Morillo           Reason for Visit     Knee Pain     Otalgia           Allergies as of 2017     Allergen Noted Reactions    Amoxicillin 2013       Hydrocodone 2013         You were diagnosed with     Bilateral chronic knee pain   [803012]  -  Primary     Uncontrolled type 2 diabetes mellitus with insulin therapy (CMS-[facility])   [346635]       Obesity (BMI 30.0-34.9)   [723610]       S/P knee replacement   [532963]         Vital Signs     Blood Pressure Pulse Temperature Respirations Height Weight    140/80 mmHg 88 37.1 °C (98.7 °F) 16 1.702 m (5' 7\") 97.977 kg (216 lb)    Body Mass Index Oxygen Saturation Smoking Status             33.82 kg/m2 97% Never Smoker          Basic Information     Date Of Birth Sex Race Ethnicity Preferred Language    1950 Male  or   Origin (Maldivian,Kittitian,Sammarinese,Andreas, etc) English      Your appointments     May 30, 2017 11:20 AM   New Patient with C Rotation   [facility] (--)    [address] [facility] [address]  [place] [address] [phone]           Please bring enclosed paperwork completed along with your insurance card and photo ID.            2017  2:15 PM   ECHO with [facility] EXAM 10   ECHOCARDIOLOGY [facility])    [address] [facility] [place] [address] [phone]           No prep            2017 10:00 AM   Diabetes Care Visit with Maria D Rhodes M.D., Connei Lawrence R.N.   [facility])    [address] [facility], [address]  [place] [address] [phone]           You will be receiving a confirmation call a few days before your appointment from our automated call confirmation system.              Problem List              ICD-10-CM " Priority Class Noted - Resolved    Hypertension (Chronic) I10   4/16/2013 - Present    Restless legs syndrome (RLS) G25.81   4/16/2013 - Present    PATTI (obstructive sleep apnea) (Chronic) G47.33   4/16/2013 - Present    Low testosterone E29.1   4/16/2013 - Present    Anxiety (Chronic) F41.9   4/16/2013 - Present    S/P knee replacement Z96.659   4/16/2013 - Present    External hemorrhoid, bleeding K64.4   7/6/2015 - Present    Chronic insomnia F51.04   7/6/2015 - Present    Obesity (BMI 30.0-34.9) E66.9   9/23/2015 - Present    Uncontrolled type 2 diabetes mellitus with insulin therapy (CMS-[facility]) E11.65, Z79.4   3/23/2017 - Present    Non compliance w medication regimen Z91.14   3/23/2017 - Present      Health Maintenance        Date Due Completion Dates    IMM DTaP/Tdap/Td Vaccine (1 - Tdap) 7/5/1968 ---    IMM PNEUMOCOCCAL 65+ (ADULT) LOW/MEDIUM RISK SERIES (2 of 2 - PCV13) 7/6/2016 7/6/2015    RETINAL SCREENING 9/15/2016 9/15/2015 (Done), 3/16/2012 (Done)    Override on 9/15/2015: Done    [place] on 3/16/2012: [place]    DIABETES MONOFILAMENT / LE EXAM 8/9/2017 8/9/2016, 4/16/2013 (Done)    Override on 4/16/2013: Done    A1C SCREENING 10/7/2017 4/7/2017, 8/12/2016, 10/23/2015, 7/6/2015, 8/14/2013    FASTING LIPID PROFILE 4/7/2018 4/7/2017, 8/12/2016, 10/23/2015, 7/6/2015, 8/14/2013    URINE ACR / MICROALBUMIN 4/7/2018 4/7/2017, 8/12/2016, 7/6/2015, 8/14/2013    SERUM CREATININE 4/7/2018 4/7/2017, 8/12/2016, 10/23/2015, 7/6/2015, 8/14/2013    COLONOSCOPY 4/16/2021 4/16/2011 (Done)    Override on 4/16/2011: Done ([facility])            Current Immunizations     Pneumococcal polysaccharide vaccine (PPSV-23) 7/6/2015    SHINGLES VACCINE 7/6/2015      Below and/or attached are the medications your provider expects you to take. Review all of your home medications and newly ordered medications with your provider and/or pharmacist. Follow medication instructions as directed by your provider and/or pharmacist.  "Please keep your medication list with you and share with your provider. Update the information when medications are discontinued, doses are changed, or new medications (including over-the-counter products) are added; and carry medication information at all times in the event of emergency situations     Allergies:  AMOXICILLIN - (reactions not documented)     HYDROCODONE - (reactions not documented)               Medications  Valid as of: May 16, 2017 -  9:53 AM    Generic Name Brand Name Tablet Size Instructions for use    AmLODIPine Besylate (Tab) NORVASC 5 MG Take 1 Tab by mouth every day.        Aspirin (Tab) aspirin 81 MG Take 81 mg by mouth every day.        Atorvastatin Calcium (Tab) LIPITOR 40 MG TAKE 1 TABLET EVERY DAY        Blood Glucose Monitoring Suppl (Misc) Blood Glucose Monitoring Suppl SUPPLIES Test strips order: Test strips for Accucheck Marie meter.   Patient to check blood sugars 4 times per day        HydroCHLOROthiazide (Tab) HYDRODIURIL 25 MG TAKE 1 TABLET BY MOUTH EVERY DAY        Insulin NPH Human (Isophane) (Suspension) HUMULIN,NOVOLIN 100 UNIT/ML Inject 28 Units as instructed 2 Times a Day.        Insulin Regular Human (Solution) HUMULIN R 100 Unit/mL Inject 16 Units as instructed 3 times a day before meals.        Insulin Syringe-Needle U-100 (Misc) INSULIN SYRINGE .3CC/31GX5/16\" 31G X 5/16\" 0.3 ML 1 Syringe by Does not apply route 5 Times a Day.        Lancets ([facility]) FREESTYLE LANCETS  Apply 1 Each to affected area(s) 5 Times a Day.        Lisinopril (Tab) PRINIVIL, ZESTRIL 40 MG TAKE 1 TABLET BY MOUTH EVERY DAY        MetFORMIN HCl (TABLET SR 24 HR) Metformin HCl 1000 MG Take 1,000 mg by mouth 2 times a day.        NON SPECIFIED   1 Each by Does not apply route See Admin Instructions.        PARoxetine HCl (Tab) PAXIL 30 MG TAKE 1 TABLET BY MOUTH EVERY DAY WITH FOOD        Pramipexole Dihydrochloride (Tab) MIRAPEX 1 MG Take 1 mg by mouth 3 times a day.        .                 Medicines " prescribed today were sent to:     [facility] - [place], [place] - [address] [facility]    [address] [facility] [place] [address]    Phone: [phone] Fax: [phone]    Open 24 Hours?: No    [facility] DRUG STORE [address] - [place], [place] - [address] AT [facility] [address]    [address] [place] [address]    Phone: [phone] Fax: [phone]    Open 24 Hours?: No      Medication refill instructions:       If your prescription bottle indicates you have medication refills left, it is not necessary to call your provider’s office. Please contact your pharmacy and they will refill your medication.    If your prescription bottle indicates you do not have any refills left, you may request refills at any time through one of the following ways: The online [employer] system (except Urgent Care), by calling your provider’s office, or by asking your pharmacy to contact your provider’s office with a refill request. Medication refills are processed only during regular business hours and may not be available until the next business day. Your provider may request additional information or to have a follow-up visit with you prior to refilling your medication.   *Please Note: Medication refills are assigned a new Rx number when refilled electronically. Your pharmacy may indicate that no refills were authorized even though a new prescription for the same medication is available at the pharmacy. Please request the medicine by name with the pharmacy before contacting your provider for a refill.        Your To Do List     Future Labs/Procedures Complete By Expires    DX-KNEE 3 VIEWS LEFT  As directed 5/16/2018    DX-KNEE 3 VIEWS RIGHT  As directed 5/16/2018      Referral     A referral request has been sent to our patient care coordination department. Please allow 3-5 business days for us to process this request and contact you either by phone or mail. If you do not hear from us by the 5th business day,  please call us at [phone].           [employer] Access Code: Activation code not generated  Current [employer] Status: Active

## 2017-05-16 NOTE — PROGRESS NOTES
Chief Complaint   Patient presents with   • Knee Pain   • Otalgia       HISTORY OF PRESENT ILLNESS: Patient is a 66 y.o. male established patient who presents today to discuss the followin. Bilateral chronic knee pain 4. S/P knee replacement  Patient's personal history of bilateral total knee replacements.  Left was done , the right was done .  Both were done in [place], therefore there are no records available for our review.  Patient has been experiencing worsening knee pain, left worse than right.  He denies any new injury or accident.  He reports an unstable sensation in the left knee.  States that it gave out a few days ago while walking.  He denies falling.    2. Uncontrolled type 2 diabetes mellitus with insulin therapy (CMS-[facility])  Patient is scheduled to follow-up with the endocrinologist next month.  He reports checking her blood sugars at home and states that he is consistently getting fasting blood sugars below 200.  Postprandial sugars remain above 200.  He reports taking his medications daily as prescribed.  He continues to report urinary frequency.    3. Obesity (BMI 30.0-34.9)    Allergies:Amoxicillin and Hydrocodone    Current Outpatient Prescriptions Ordered in [facility]   Medication Sig Dispense Refill   • Blood Glucose Monitoring Suppl SUPPLIES Misc Test strips order: Test strips for Accucheck Marie meter.   Patient to check blood sugars 4 times per day 150 Each 6   • amlodipine (NORVASC) 5 MG Tab Take 1 Tab by mouth every day. 30 Tab 3   • hydrochlorothiazide (HYDRODIURIL) 25 MG Tab TAKE 1 TABLET BY MOUTH EVERY DAY 90 Tab 1   • lisinopril (PRINIVIL, ZESTRIL) 40 MG tablet TAKE 1 TABLET BY MOUTH EVERY DAY 90 Tab 0   • paroxetine (PAXIL) 30 MG Tab TAKE 1 TABLET BY MOUTH EVERY DAY WITH FOOD 90 Tab 0   • FREESTYLE LANCETS Misc Apply 1 Each to affected area(s) 5 Times a Day. 500 Each 11   • aspirin 81 MG tablet Take 81 mg by mouth every day.     • pramipexole (MIRAPEX) 1 MG Tab Take 1 mg by  "mouth 3 times a day.     • atorvastatin (LIPITOR) 40 MG Tab TAKE 1 TABLET EVERY DAY 90 Tab 3   • metformin ER 1000 MG TABLET SR 24 HR Take 1,000 mg by mouth 2 times a day. 180 Tab 2   • insulin NPH (HUMULIN,NOVOLIN) 100 UNIT/ML Suspension Inject 28 Units as instructed 2 Times a Day. 20 mL 5   • insulin regular (HUMULIN R) 100 Unit/mL Solution Inject 16 Units as instructed 3 times a day before meals. 20 mL 5   • Insulin Syringe-Needle U-100 (INSULIN SYRINGE .3CC/31GX5/16\") 31G X 5/16\" 0.3 ML Misc 1 Syringe by Does not apply route 5 Times a Day. 200 Each 5   • NON SPECIFIED 1 Each by Does not apply route See Admin Instructions. 1 Each 2     No current Epic-ordered facility-administered medications on file.       Past Medical History   Diagnosis Date   • Hypertension 4/16/2013   • Restless legs syndrome (RLS) 4/16/2013   • PATTI (obstructive sleep apnea) 4/16/2013     Cannot tolerate sleep apnea   • Low testosterone 4/16/2013   • Anxiety 4/16/2013     As needed for anxiety       Social History   Substance Use Topics   • Smoking status: Never Smoker    • Smokeless tobacco: Never Used   • Alcohol Use: 0.0 oz/week     0 Standard drinks or equivalent per week      Comment: 1-2 per week       No family status information on file.     Family History   Problem Relation Age of Onset   • Diabetes Mother    • Diabetes Father    • Diabetes Sister    • Heart Disease Neg Hx    • Heart Failure Neg Hx        ROS: see above    Review of Systems   Constitutional: Negative for fever, chills, weight loss and malaise/fatigue.   HENT: Negative for ear pain, nosebleeds, congestion, sore throat and neck pain.    Eyes: Negative for blurred vision.   Respiratory: Negative for cough, sputum production, shortness of breath and wheezing.    Cardiovascular: Negative for chest pain, palpitations, orthopnea and leg swelling.   Gastrointestinal: Negative for heartburn, nausea, vomiting and abdominal pain.   Genitourinary: Negative for dysuria, urgency " "and frequency.   Musculoskeletal: Negative for myalgias, back pain and joint pain.   Skin: Negative for rash and itching.   Neurological: Negative for dizziness, tingling, tremors, sensory change, focal weakness and headaches.   Endo/Heme/Allergies: Does not bruise/bleed easily.   Psychiatric/Behavioral: Negative for depression, suicidal ideas and memory loss.  The patient is not nervous/anxious and does not have insomnia.        Exam:  Blood pressure 140/80, pulse 88, temperature 37.1 °C (98.7 °F), resp. rate 16, height 1.702 m (5' 7\"), weight 97.977 kg (216 lb), SpO2 97 %.  General:  Well nourished, well developed male in NAD  Head is grossly normal.  Neck: Thyroid is not enlarged.  Pulmonary: Normal effort.   Cardiovascular: Regular rate.   Extremities: no clubbing, cyanosis, or edema.  No deformity, effusion or swelling.  Well healed surgical scar.  Full ROM of both knees.  No assistive device used.  Psych:  Mood and affect are normal.  Answering questions appropriately with good eye contact.      Please note that this dictation was created using voice recognition software. I have made every reasonable attempt to correct obvious errors, but I expect that there are errors of grammar and possibly content that I did not discover before finalizing the note.    Assessment/Plan:    1. Bilateral chronic knee pain  DX-KNEE 3 VIEWS LEFT    DX-KNEE 3 VIEWS RIGHT    REFERRAL TO ORTHOPEDICS   2. Uncontrolled type 2 diabetes mellitus with insulin therapy (CMS-[facility])     3. Obesity (BMI 30.0-34.9)     4. S/P knee replacement  DX-KNEE 3 VIEWS LEFT    DX-KNEE 3 VIEWS RIGHT    REFERRAL TO ORTHOPEDICS        1.  Update x-rays today, bilateral knees weightbearing.  2.  Referral to orthopedics for further evaluation to determine if the prosthetics are wearing or failing.  3.  Encouraged patient to record blood sugar readings at home in order to take to the endocrinology appointment on June 8.  4.  Will contact patient with the above " results, follow-up pending.

## 2017-05-30 ENCOUNTER — APPOINTMENT (OUTPATIENT)
Dept: SLEEP MEDICINE | Facility: MEDICAL CENTER | Age: 67
End: 2017-05-30
Payer: MEDICARE

## 2017-05-30 DIAGNOSIS — I10 ESSENTIAL HYPERTENSION: ICD-10-CM

## 2017-05-30 DIAGNOSIS — Z79.4 TYPE 2 DIABETES MELLITUS WITH HYPERGLYCEMIA, WITH LONG-TERM CURRENT USE OF INSULIN (HCC): ICD-10-CM

## 2017-05-30 DIAGNOSIS — E11.65 TYPE 2 DIABETES MELLITUS WITH HYPERGLYCEMIA, WITH LONG-TERM CURRENT USE OF INSULIN (HCC): ICD-10-CM

## 2017-05-30 RX ORDER — AMLODIPINE BESYLATE 5 MG/1
5 TABLET ORAL DAILY
Qty: 90 TAB | Refills: 0 | Status: SHIPPED | OUTPATIENT
Start: 2017-05-30 | End: 2017-07-25

## 2017-05-30 RX ORDER — LANCETS 28 GAUGE
1 EACH MISCELLANEOUS
Qty: 500 EACH | Refills: 11 | Status: SHIPPED | OUTPATIENT
Start: 2017-05-30 | End: 2017-06-08 | Stop reason: CLARIF

## 2017-05-30 RX ORDER — PRAMIPEXOLE DIHYDROCHLORIDE 1 MG/1
1 TABLET ORAL 3 TIMES DAILY
Qty: 90 TAB | Refills: 2 | Status: SHIPPED | OUTPATIENT
Start: 2017-05-30 | End: 2017-12-18 | Stop reason: SDUPTHER

## 2017-05-30 RX ORDER — CALCIUM CARB/VITAMIN D3/VIT K1 500-100-40
1 TABLET,CHEWABLE ORAL
Qty: 200 EACH | Refills: 5 | Status: SHIPPED | OUTPATIENT
Start: 2017-05-30 | End: 2017-12-18 | Stop reason: SDUPTHER

## 2017-05-30 RX ORDER — LISINOPRIL 40 MG/1
40 TABLET ORAL
Qty: 90 TAB | Refills: 0 | Status: SHIPPED | OUTPATIENT
Start: 2017-05-30 | End: 2017-09-14 | Stop reason: SDUPTHER

## 2017-05-30 RX ORDER — PAROXETINE 30 MG/1
TABLET, FILM COATED ORAL
Qty: 90 TAB | Refills: 0 | Status: SHIPPED | OUTPATIENT
Start: 2017-05-30 | End: 2017-09-14 | Stop reason: SDUPTHER

## 2017-06-01 ENCOUNTER — HOSPITAL ENCOUNTER (OUTPATIENT)
Dept: CARDIOLOGY | Facility: MEDICAL CENTER | Age: 67
End: 2017-06-01
Attending: NURSE PRACTITIONER
Payer: MEDICARE

## 2017-06-01 DIAGNOSIS — I10 ESSENTIAL HYPERTENSION: ICD-10-CM

## 2017-06-01 DIAGNOSIS — G47.33 OSA (OBSTRUCTIVE SLEEP APNEA): Chronic | ICD-10-CM

## 2017-06-01 LAB
LV EJECT FRACT  99904: 65
LV EJECT FRACT MOD 2C 99903: 69.83
LV EJECT FRACT MOD 4C 99902: 68.06
LV EJECT FRACT MOD BP 99901: 68.62

## 2017-06-01 PROCEDURE — 93306 TTE W/DOPPLER COMPLETE: CPT | Mod: 26 | Performed by: INTERNAL MEDICINE

## 2017-06-01 PROCEDURE — 93306 TTE W/DOPPLER COMPLETE: CPT

## 2017-06-08 ENCOUNTER — OFFICE VISIT (OUTPATIENT)
Dept: ENDOCRINOLOGY | Facility: MEDICAL CENTER | Age: 67
End: 2017-06-08
Payer: MEDICARE

## 2017-06-08 VITALS
HEIGHT: 67 IN | OXYGEN SATURATION: 93 % | SYSTOLIC BLOOD PRESSURE: 144 MMHG | HEART RATE: 104 BPM | DIASTOLIC BLOOD PRESSURE: 84 MMHG | WEIGHT: 214 LBS | BODY MASS INDEX: 33.59 KG/M2

## 2017-06-08 DIAGNOSIS — Z79.4 TYPE 2 DIABETES MELLITUS WITH HYPERGLYCEMIA, WITH LONG-TERM CURRENT USE OF INSULIN (HCC): ICD-10-CM

## 2017-06-08 DIAGNOSIS — I10 ESSENTIAL HYPERTENSION: ICD-10-CM

## 2017-06-08 DIAGNOSIS — E78.2 MIXED HYPERLIPIDEMIA: ICD-10-CM

## 2017-06-08 DIAGNOSIS — E11.65 TYPE 2 DIABETES MELLITUS WITH HYPERGLYCEMIA, WITH LONG-TERM CURRENT USE OF INSULIN (HCC): ICD-10-CM

## 2017-06-08 LAB — HBA1C MFR BLD: 8.8 % (ref ?–5.8)

## 2017-06-08 PROCEDURE — 3045F PR MOST RECENT HEMOGLOBIN A1C LEVEL 7.0-9.0%: CPT | Performed by: INTERNAL MEDICINE

## 2017-06-08 PROCEDURE — 4040F PNEUMOC VAC/ADMIN/RCVD: CPT | Performed by: INTERNAL MEDICINE

## 2017-06-08 PROCEDURE — 3017F COLORECTAL CA SCREEN DOC REV: CPT | Performed by: INTERNAL MEDICINE

## 2017-06-08 PROCEDURE — 99214 OFFICE O/P EST MOD 30 MIN: CPT | Performed by: INTERNAL MEDICINE

## 2017-06-08 PROCEDURE — G8417 CALC BMI ABV UP PARAM F/U: HCPCS | Performed by: INTERNAL MEDICINE

## 2017-06-08 PROCEDURE — 1036F TOBACCO NON-USER: CPT | Performed by: INTERNAL MEDICINE

## 2017-06-08 PROCEDURE — 1101F PT FALLS ASSESS-DOCD LE1/YR: CPT | Performed by: INTERNAL MEDICINE

## 2017-06-08 PROCEDURE — G8432 DEP SCR NOT DOC, RNG: HCPCS | Performed by: INTERNAL MEDICINE

## 2017-06-08 RX ORDER — LANCETS 30 GAUGE
EACH MISCELLANEOUS
Qty: 1 EACH | Refills: 12 | Status: SHIPPED
Start: 2017-06-08 | End: 2018-08-17 | Stop reason: SDUPTHER

## 2017-06-08 NOTE — PROGRESS NOTES
Endocrinology Clinic Progress Note  PCP: MERARY Morillo    CC: Diabetes    HPI:  Carlos Pulido is a 66 y.o. old patient who comes in today for routine follow up.     Type 2 diabetes: He is currently on NPH 28 units twice a day and regular 16-20 units 3 times a day with meals. Also on metformin 1000 g twice a day. He checks blood sugars 5-6 times a day. Fasting blood sugar in the morning is mostly close to 200. Blood sugars before lunch and before dinner are much better controlled and bedtime blood sugar readings are close to goal as well. No hypoglycemia. Hemoglobin A1c today in the clinic is 8.8%, down from 11.2% last year.    Hypertension: Blood pressure is slightly higher than goal in the clinic today. He is ACE inhibitor.    Hyperlipidemia: Cholesterol levels are at goal, he is currently on Lipitor, tolerating well.    ROS:  Constitutional: No unintentional weight loss  Endo: Denies excessive thirst or frequent urination    Past Medical History:  Patient Active Problem List    Diagnosis Date Noted   • Uncontrolled type 2 diabetes mellitus with insulin therapy (CMS-[facility]) 03/23/2017   • Non compliance w medication regimen 03/23/2017   • Obesity (BMI 30.0-34.9) 09/23/2015   • External hemorrhoid, bleeding 07/06/2015   • Chronic insomnia 07/06/2015   • Hypertension 04/16/2013   • Restless legs syndrome (RLS) 04/16/2013   • PATTI (obstructive sleep apnea) 04/16/2013   • Low testosterone 04/16/2013   • Anxiety 04/16/2013   • S/P knee replacement 04/16/2013       Medications:    Current outpatient prescriptions:   •  Lancets Misc, Lancets order:   Accucheck Soft Clix lancets  Testing blood sugars 5 times per day for insulin adjustment., Disp: 1 Each, Rfl: 12  •  paroxetine (PAXIL) 30 MG Tab, TAKE 1 TABLET BY MOUTH EVERY DAY WITH FOOD, Disp: 90 Tab, Rfl: 0  •  amlodipine (NORVASC) 5 MG Tab, Take 1 Tab by mouth every day., Disp: 90 Tab, Rfl: 0  •  pramipexole (MIRAPEX) 1 MG Tab, Take 1 Tab by mouth 3 times a  "day., Disp: 90 Tab, Rfl: 2  •  lisinopril (PRINIVIL, ZESTRIL) 40 MG tablet, Take 1 Tab by mouth every day., Disp: 90 Tab, Rfl: 0  •  insulin regular (HUMULIN R) 100 Unit/mL Solution, Inject 16 Units as instructed 3 times a day before meals. (Patient taking differently: Inject 16 Units as instructed 3 times a day before meals. RELION BRAND INSULIN AT [facility]), Disp: 20 mL, Rfl: 5  •  insulin NPH (HUMULIN,NOVOLIN) 100 UNIT/ML Suspension, Inject 28 Units as instructed 2 Times a Day. (Patient taking differently: Inject 28 Units as instructed 2 Times a Day. RELION BRAND AT [facility]), Disp: 20 mL, Rfl: 5  •  hydrochlorothiazide (HYDRODIURIL) 25 MG Tab, TAKE 1 TABLET BY MOUTH EVERY DAY, Disp: 90 Tab, Rfl: 1  •  aspirin 81 MG tablet, Take 81 mg by mouth every day., Disp: , Rfl:   •  metformin ER 1000 MG TABLET SR 24 HR, Take 1,000 mg by mouth 2 times a day., Disp: 180 Tab, Rfl: 2  •  Insulin Syringe-Needle U-100 (INSULIN SYRINGE .3CC/31GX5/16\") 31G X 5/16\" 0.3 ML Misc, 1 Syringe by Does not apply route 5 Times a Day., Disp: 200 Each, Rfl: 5  •  Blood Glucose Monitoring Suppl SUPPLIES Misc, Test strips order: Test strips for Accucheck Marie meter.   Patient to check blood sugars 4 times per day, Disp: 150 Each, Rfl: 6  •  atorvastatin (LIPITOR) 40 MG Tab, TAKE 1 TABLET EVERY DAY, Disp: 90 Tab, Rfl: 3    Labs:   Results for DOMINICK HATCH (MRN [id number]) as of 6/8/2017 09:49   Ref. Range 8/12/2016 07:51 4/7/2017 10:01   Sodium Latest Ref Range: 135-145 mmol/L 134 (L) 136   Potassium Latest Ref Range: 3.6-5.5 mmol/L 3.7 3.4 (L)   Chloride Latest Ref Range:  mmol/L 99 100   Co2 Latest Ref Range: 20-33 mmol/L 28 27   Anion Gap Latest Ref Range: 0.0-11.9  7.0 9.0   Glucose Latest Ref Range: 65-99 mg/dL 323 (H) 102 (H)   Bun Latest Ref Range: 8-22 mg/dL 13 19   Creatinine Latest Ref Range: 0.50-1.40 mg/dL 1.06 1.29   GFR If  Latest Ref Range: >60 mL/min/1.73 m 2 >60 >60   GFR If Non  Latest " "Ref Range: >60 mL/min/1.73 m 2 >60 55 (A)   Calcium Latest Ref Range: 8.5-10.5 mg/dL 9.0 9.4   AST(SGOT) Latest Ref Range: 12-45 U/L 15 17   ALT(SGPT) Latest Ref Range: 2-50 U/L 24 14   Alkaline Phosphatase Latest Ref Range: 30-99 U/L 132 (H) 87   Total Bilirubin Latest Ref Range: 0.1-1.5 mg/dL 0.7 0.6   Albumin Latest Ref Range: 3.2-4.9 g/dL 3.8 4.2   Total Protein Latest Ref Range: 6.0-8.2 g/dL 7.2 7.3   Globulin Latest Ref Range: 1.9-3.5 g/dL 3.4 3.1   A-G Ratio Latest Units: g/dL 1.1 1.4   Glycohemoglobin Latest Ref Range: 0.0-5.6 % 11.2 (H) 9.6 (H)   Estim. Avg Glu Latest Units: mg/dL 275 229   Cholesterol,Tot Latest Ref Range: 100-199 mg/dL 224 (H) 152   Triglycerides Latest Ref Range: 0-149 mg/dL 235 (H) 149   HDL Latest Ref Range: >=40 mg/dL 46 44   LDL Latest Ref Range: <100 mg/dL 131 (H) 78   Micro Alb Creat Ratio Latest Ref Range: 0-30 mg/g 1018 (H) 968 (H)   Creatinine, Urine Latest Units: mg/dL 135.00 99.20   Microalbumin, Urine Random Latest Units: mg/dL 137.4 96.0     Physical Examination:  Vital signs: /84 mmHg  Pulse 104  Ht 1.702 m (5' 7\")  Wt 97.07 kg (214 lb)  BMI 33.51 kg/m2  SpO2 93%  General: No apparent distress, cooperative  Eyes: No scleral icterus, no discharge, normal eyelids  Neck: No abnormal masses on inspection   Resp: Normal effort  Psych: Alert and oriented, normal mood and affect    Assessment and Plan:    1. Type 2 diabetes mellitus with hyperglycemia, with long-term current use of insulin (CMS-HCC)  · Hemoglobin A1c today in the clinic is 8.8%  · Goal hemoglobin A1c less than 7%  · Due to fasting hyperglycemia we increased the dose of NPH to 32 units twice a day  · Continue regular insulin 16 units 3 times a day with meals plus correction factor  · Continue metformin 1000 mg twice a day  · Advised to continue checking blood sugars at least 4 times a day  - Lancets Misc; Lancets order:   Accucheck Soft Clix lancets  Testing blood sugars 5 times per day for insulin " adjustment.  Dispense: 1 Each; Refill: 12    2. Essential hypertension  · Blood pressure is slightly higher than goal in the clinic today, continue ACE inhibitor, no changes to medications today    3. Mixed hyperlipidemia  · LDL 78, triglycerides 149  · Continue Lipitor    Return in about 3 months (around 9/8/2017).    Thank you for allowing me to participate in the care of this patient.    Maria D Rhodes M.D.    CC:   Ila Ybarra A.PANA.    This note was created using voice recognition software (Dragon). The accuracy of the dictation is limited by the abilities of the software. I have reviewed the note prior to signing, however some errors in grammar and context are still possible. If you have any questions related to this note please do not hesitate to contact our office.

## 2017-06-08 NOTE — PROGRESS NOTES
Patient with existing type diabetes:  Type 2 diabetes uncontrolled, here for follow up.      Patient's health status since last visit: having issues with pain in both of his knees, seeing orthopedic surgeon today (Hossein Sandra).  Complaining of some neuropathy in his hands and also complaining of erectile dysfunction.   Issues with diabetes since last visit none.   Current Diabetes Medications: NPH 28 units bid and Regular insulin 16 units with meals, Metformin 1000 mg bid.     HbA1c: @hba1c@  Lab Results   Component Value Date/Time    GLYCOHEMOGLOBIN 8.8 06/08/2017        FSBS  Testing: states he is testing 4-5 times per day    Hypoglycemia: denies   Exercise: not at this time due to pain in his knees.     Retinal Exam: states completed in September.  He can't remember the providers name.     Daily Foot Exam: states he checks feet daily, complaining of a little increase neuropathy symptoms.     Routine Dental Exams: past due.   Flu vaccine: current  Pneumonia vaccine unknown.

## 2017-06-08 NOTE — MR AVS SNAPSHOT
"Carlos Pulido   2017 10:00 AM   Office Visit   MRN: [id number]    Department:  Endocrinology [facility]   Dept Phone:  [phone]    Description:  Male : 1950   Provider:  Connie Lawrence R.N.; Maria D Rhodes M.D.           Reason for Visit     Insulin Dependent Diabetes follow up appt.       Allergies as of 2017     Allergen Noted Reactions    Amoxicillin 2013       Hydrocodone 2013         You were diagnosed with     Uncontrolled type 2 diabetes mellitus with insulin therapy (CMS-[facility])   [109427]         Vital Signs     Blood Pressure Pulse Height Weight Body Mass Index Oxygen Saturation    144/84 mmHg 104 1.702 m (5' 7\") 97.07 kg (214 lb) 33.51 kg/m2 93%    Smoking Status                   Never Smoker            Basic Information     Date Of Birth Sex Race Ethnicity Preferred Language    1950 Male  or   Origin (Syriac,Algerian,Nicaraguan,Greek, etc) English      Your appointments     Sep 08, 2017  1:00 PM   Diabetes Care Visit with Maria D Rhodes M.D., Connie Lawrence R.N.   [facility]    [address], [address]  [place] [address] [phone]           You will be receiving a confirmation call a few days before your appointment from our automated call confirmation system.              Problem List              ICD-10-CM Priority Class Noted - Resolved    Hypertension (Chronic) I10   2013 - Present    Restless legs syndrome (RLS) G25.81   2013 - Present    PATTI (obstructive sleep apnea) (Chronic) G47.33   2013 - Present    Low testosterone E29.1   2013 - Present    Anxiety (Chronic) F41.9   2013 - Present    S/P knee replacement Z96.659   2013 - Present    External hemorrhoid, bleeding K64.4   2015 - Present    Chronic insomnia F51.04   2015 - Present    Obesity (BMI 30.0-34.9) E66.9   2015 - Present    Uncontrolled type 2 diabetes mellitus with insulin " therapy (CMS-HCC) E11.65, Z79.4   3/23/2017 - Present    Non compliance w medication regimen Z91.14   3/23/2017 - Present      Health Maintenance        Date Due Completion Dates    IMM DTaP/Tdap/Td Vaccine (1 - Tdap) 7/5/1968 ---    IMM PNEUMOCOCCAL 65+ (ADULT) LOW/MEDIUM RISK SERIES (2 of 2 - PCV13) 7/6/2016 7/6/2015    RETINAL SCREENING 9/15/2016 9/15/2015 (Done), 3/16/2012 (Done)    Override on 9/15/2015: Done    [place] 3/16/2012: [place]    DIABETES MONOFILAMENT / LE EXAM 8/9/2017 8/9/2016, 4/16/2013 (Done)    Override on 4/16/2013: Done    A1C SCREENING 10/7/2017 4/7/2017, 8/12/2016, 10/23/2015, 7/6/2015, 8/14/2013    FASTING LIPID PROFILE 4/7/2018 4/7/2017, 8/12/2016, 10/23/2015, 7/6/2015, 8/14/2013    URINE ACR / MICROALBUMIN 4/7/2018 4/7/2017, 8/12/2016, 7/6/2015, 8/14/2013    SERUM CREATININE 4/7/2018 4/7/2017, 8/12/2016, 10/23/2015, 7/6/2015, 8/14/2013    COLONOSCOPY 4/16/2021 4/16/2011 (Done)    Override on 4/16/2011: Done ([place])            Current Immunizations     Pneumococcal polysaccharide vaccine (PPSV-23) 7/6/2015    SHINGLES VACCINE 7/6/2015      Below and/or attached are the medications your provider expects you to take. Review all of your home medications and newly ordered medications with your provider and/or pharmacist. Follow medication instructions as directed by your provider and/or pharmacist. Please keep your medication list with you and share with your provider. Update the information when medications are discontinued, doses are changed, or new medications (including over-the-counter products) are added; and carry medication information at all times in the event of emergency situations     Allergies:  AMOXICILLIN - (reactions not documented)     HYDROCODONE - (reactions not documented)               Medications  Valid as of: June 08, 2017 - 10:15 AM    Generic Name Brand Name Tablet Size Instructions for use    AmLODIPine Besylate (Tab) NORVASC 5 MG Take 1 Tab by mouth  "every day.        Aspirin (Tab) aspirin 81 MG Take 81 mg by mouth every day.        Atorvastatin Calcium (Tab) LIPITOR 40 MG TAKE 1 TABLET EVERY DAY        Blood Glucose Monitoring Suppl (Misc) Blood Glucose Monitoring Suppl SUPPLIES Test strips order: Test strips for Accucheck Marie meter.   Patient to check blood sugars 4 times per day        HydroCHLOROthiazide (Tab) HYDRODIURIL 25 MG TAKE 1 TABLET BY MOUTH EVERY DAY        Insulin NPH Human (Isophane) (Suspension) HUMULIN,NOVOLIN 100 UNIT/ML Inject 28 Units as instructed 2 Times a Day.        Insulin Regular Human (Solution) HUMULIN R 100 Unit/mL Inject 16 Units as instructed 3 times a day before meals.        Insulin Syringe-Needle U-100 (Misc) INSULIN SYRINGE .3CC/31GX5/16\" 31G X 5/16\" 0.3 ML 1 Syringe by Does not apply route 5 Times a Day.        Lancets (Misc) Lancets  Lancets order:   Accucheck Soft Clix lancets  Testing blood sugars 5 times per day for insulin adjustment.        Lisinopril (Tab) PRINIVIL, ZESTRIL 40 MG Take 1 Tab by mouth every day.        MetFORMIN HCl (TABLET SR 24 HR) Metformin HCl 1000 MG Take 1,000 mg by mouth 2 times a day.        PARoxetine HCl (Tab) PAXIL 30 MG TAKE 1 TABLET BY MOUTH EVERY DAY WITH FOOD        Pramipexole Dihydrochloride (Tab) MIRAPEX 1 MG Take 1 Tab by mouth 3 times a day.        .                 Medicines prescribed today were sent to:     [facility] [address] [place] [address] - [address] [facility] [address]    Phone: [phone] Fax: [phone]    Open 24 Hours?: No    [facility] [address] [place] [address] AT [address] [address]    Phone: [phone] Fax: [phone]    Open 24 Hours?: No      Medication refill instructions:       If your prescription bottle indicates you have medication refills left, it is not necessary to call your provider’s office. Please contact your pharmacy and they will refill " your medication.    If your prescription bottle indicates you do not have any refills left, you may request refills at any time through one of the following ways: The online [employer] system (except Urgent Care), by calling your provider’s office, or by asking your pharmacy to contact your provider’s office with a refill request. Medication refills are processed only during regular business hours and may not be available until the next business day. Your provider may request additional information or to have a follow-up visit with you prior to refilling your medication.   *Please Note: Medication refills are assigned a new Rx number when refilled electronically. Your pharmacy may indicate that no refills were authorized even though a new prescription for the same medication is available at the pharmacy. Please request the medicine by name with the pharmacy before contacting your provider for a refill.           [employer] Access Code: Activation code not generated  Current [employer] Status: Active

## 2017-06-16 RX ORDER — LISINOPRIL 40 MG/1
TABLET ORAL
Refills: 0 | OUTPATIENT
Start: 2017-06-16

## 2017-06-16 RX ORDER — PAROXETINE 30 MG/1
TABLET, FILM COATED ORAL
Refills: 0 | OUTPATIENT
Start: 2017-06-16

## 2017-06-16 NOTE — TELEPHONE ENCOUNTER
Refills done 5/30/17 for both medications. 3 month supplies, confirmed receipt by pharmacy. Not due until August.

## 2017-06-26 ENCOUNTER — HOSPITAL ENCOUNTER (OUTPATIENT)
Dept: LAB | Facility: MEDICAL CENTER | Age: 67
End: 2017-06-26
Attending: UROLOGY
Payer: MEDICARE

## 2017-06-26 LAB
BUN SERPL-MCNC: 18 MG/DL (ref 8–22)
CREAT SERPL-MCNC: 1.18 MG/DL (ref 0.5–1.4)
GFR SERPL CREATININE-BSD FRML MDRD: >60 ML/MIN/1.73 M 2

## 2017-06-26 PROCEDURE — 84520 ASSAY OF UREA NITROGEN: CPT

## 2017-06-26 PROCEDURE — 82565 ASSAY OF CREATININE: CPT

## 2017-06-26 PROCEDURE — 36415 COLL VENOUS BLD VENIPUNCTURE: CPT

## 2017-06-28 ENCOUNTER — HOSPITAL ENCOUNTER (OUTPATIENT)
Facility: MEDICAL CENTER | Age: 67
End: 2017-06-28
Attending: FAMILY MEDICINE
Payer: MEDICARE

## 2017-06-28 ENCOUNTER — HOSPITAL ENCOUNTER (OUTPATIENT)
Dept: RADIOLOGY | Facility: MEDICAL CENTER | Age: 67
End: 2017-06-28
Attending: FAMILY MEDICINE
Payer: MEDICARE

## 2017-06-28 ENCOUNTER — OFFICE VISIT (OUTPATIENT)
Dept: URGENT CARE | Facility: PHYSICIAN GROUP | Age: 67
End: 2017-06-28
Payer: MEDICARE

## 2017-06-28 VITALS
SYSTOLIC BLOOD PRESSURE: 128 MMHG | BODY MASS INDEX: 33.74 KG/M2 | RESPIRATION RATE: 16 BRPM | OXYGEN SATURATION: 94 % | DIASTOLIC BLOOD PRESSURE: 78 MMHG | HEIGHT: 67 IN | TEMPERATURE: 98.7 F | HEART RATE: 97 BPM | WEIGHT: 215 LBS

## 2017-06-28 DIAGNOSIS — R31.9 HEMATURIA: ICD-10-CM

## 2017-06-28 DIAGNOSIS — R80.9 PROTEINURIA, UNSPECIFIED TYPE: ICD-10-CM

## 2017-06-28 DIAGNOSIS — R10.9 RIGHT FLANK PAIN: ICD-10-CM

## 2017-06-28 LAB
ANION GAP SERPL CALC-SCNC: 10 MMOL/L (ref 0–11.9)
APPEARANCE UR: CLEAR
BILIRUB UR STRIP-MCNC: NORMAL MG/DL
BUN SERPL-MCNC: 20 MG/DL (ref 8–22)
CALCIUM SERPL-MCNC: 9.6 MG/DL (ref 8.5–10.5)
CHLORIDE SERPL-SCNC: 98 MMOL/L (ref 96–112)
CO2 SERPL-SCNC: 26 MMOL/L (ref 20–33)
COLOR UR AUTO: YELLOW
CREAT SERPL-MCNC: 1.29 MG/DL (ref 0.5–1.4)
GFR SERPL CREATININE-BSD FRML MDRD: 55 ML/MIN/1.73 M 2
GLUCOSE SERPL-MCNC: 256 MG/DL (ref 65–99)
GLUCOSE UR STRIP.AUTO-MCNC: NORMAL MG/DL
KETONES UR STRIP.AUTO-MCNC: NORMAL MG/DL
LEUKOCYTE ESTERASE UR QL STRIP.AUTO: NORMAL
NITRITE UR QL STRIP.AUTO: NORMAL
PH UR STRIP.AUTO: 6 [PH] (ref 5–8)
POTASSIUM SERPL-SCNC: 3.6 MMOL/L (ref 3.6–5.5)
PROT UR QL STRIP: 200 MG/DL
RBC UR QL AUTO: NORMAL
SODIUM SERPL-SCNC: 134 MMOL/L (ref 135–145)
SP GR UR STRIP.AUTO: 1.02
UROBILINOGEN UR STRIP-MCNC: NORMAL MG/DL

## 2017-06-28 PROCEDURE — 36415 COLL VENOUS BLD VENIPUNCTURE: CPT | Performed by: FAMILY MEDICINE

## 2017-06-28 PROCEDURE — 99214 OFFICE O/P EST MOD 30 MIN: CPT | Performed by: FAMILY MEDICINE

## 2017-06-28 PROCEDURE — 81002 URINALYSIS NONAUTO W/O SCOPE: CPT | Performed by: FAMILY MEDICINE

## 2017-06-28 PROCEDURE — 74176 CT ABD & PELVIS W/O CONTRAST: CPT

## 2017-06-28 ASSESSMENT — ENCOUNTER SYMPTOMS
EYE DISCHARGE: 0
CONSTIPATION: 0
DIARRHEA: 0
WEIGHT LOSS: 0
EYE REDNESS: 0
BLOOD IN STOOL: 0
FOCAL WEAKNESS: 0
SENSORY CHANGE: 0

## 2017-06-28 NOTE — MR AVS SNAPSHOT
"Carlos Pulido   2017 11:15 AM   Office Visit   MRN: [id number]    Department:  [facility]   Dept Phone:  [phone]    Description:  Male : 1950   Provider:  Armando Collins M.D.           Reason for Visit     Flank Pain bilateral lower back pain radiating to abdomen, worse on the right, x3 months      Allergies as of 2017     Allergen Noted Reactions    Amoxicillin 2013       Hydrocodone 2013         You were diagnosed with     Right flank pain   [183959]       Proteinuria, unspecified type   [3791089]       Hematuria   [8396491]         Vital Signs     Blood Pressure Pulse Temperature Respirations Height Weight    128/78 mmHg 97 37.1 °C (98.7 °F) 16 1.702 m (5' 7\") 97.523 kg (215 lb)    Body Mass Index Oxygen Saturation Smoking Status             33.67 kg/m2 94% Never Smoker          Basic Information     Date Of Birth Sex Race Ethnicity Preferred Language    1950 Male  or   Origin (Maldivian,Nigerien,Lao,Andreas, etc) English      Your appointments     2017  6:00 PM   CT BODY WO 30 with [place] CT 1   [facility] [place] [facility]    [address] [address]  [facility] [address] [phone]           Some exams require specific prep instructions that would have been given to you at time of scheduling. If you have any additional questions about the prep instructions, please call Imaging Scheduling at [phone] and press #[phone].            Sep 08, 2017  1:00 PM   Diabetes Care Visit with Maria D Rhodes M.D., Connie Lawrence RBrandonN.   [facility])    [address], [address]  [place] [address] [phone]           You will be receiving a confirmation call a few days before your appointment from our automated call confirmation system.              Problem List              ICD-10-CM Priority Class Noted - Resolved    Hypertension (Chronic) I10   2013 - Present    Restless legs " syndrome (RLS) G25.81   4/16/2013 - Present    PATTI (obstructive sleep apnea) (Chronic) G47.33   4/16/2013 - Present    Low testosterone E29.1   4/16/2013 - Present    Anxiety (Chronic) F41.9   4/16/2013 - Present    S/P knee replacement Z96.659   4/16/2013 - Present    External hemorrhoid, bleeding K64.4   7/6/2015 - Present    Chronic insomnia F51.04   7/6/2015 - Present    Obesity (BMI 30.0-34.9) E66.9   9/23/2015 - Present    Uncontrolled type 2 diabetes mellitus with insulin therapy (CMS-[facility]) E11.65, Z79.4   3/23/2017 - Present    Non compliance w medication regimen Z91.14   3/23/2017 - Present      Health Maintenance        Date Due Completion Dates    IMM DTaP/Tdap/Td Vaccine (1 - Tdap) 7/5/1968 ---    IMM PNEUMOCOCCAL 65+ (ADULT) LOW/MEDIUM RISK SERIES (2 of 2 - PCV13) 7/6/2016 7/6/2015    RETINAL SCREENING 9/15/2016 9/15/2015 (Done), 3/16/2012 (Done)    Override on 9/15/2015: Done    [place] 3/16/2012: [place]    DIABETES MONOFILAMENT / LE EXAM 8/9/2017 8/9/2016, 4/16/2013 (Done)    Override on 4/16/2013: Done    A1C SCREENING 12/8/2017 6/8/2017, 4/7/2017, 8/12/2016, 10/23/2015, 7/6/2015, 8/14/2013    FASTING LIPID PROFILE 4/7/2018 4/7/2017, 8/12/2016, 10/23/2015, 7/6/2015, 8/14/2013    URINE ACR / MICROALBUMIN 4/7/2018 4/7/2017, 8/12/2016, 7/6/2015, 8/14/2013    SERUM CREATININE 6/26/2018 6/26/2017, 4/7/2017, 8/12/2016, 10/23/2015, 7/6/2015, 8/14/2013    COLONOSCOPY 4/16/2021 4/16/2011 (Done)    Override on 4/16/2011: Done ([place])            Results     POCT Urinalysis      Component Value Standard Range & Units    POC Color yellow Negative    POC Appearance clear Negative    POC Leukocyte Esterase neg Negative    POC Nitrites neg Negative    POC Urobiligen neg Negative (0.2) mg/dL    POC Protein 200 Negative mg/dL    POC Urine PH 6.0 5.0 - 8.0    POC Blood sm Negative    POC Specific Gravity 1.025 <1.005 - >1.030    POC Ketones neg Negative mg/dL    POC Biliruben neg Negative mg/dL    POC  "Glucose neg Negative mg/dL                        Current Immunizations     Pneumococcal polysaccharide vaccine (PPSV-23) 7/6/2015    SHINGLES VACCINE 7/6/2015      Below and/or attached are the medications your provider expects you to take. Review all of your home medications and newly ordered medications with your provider and/or pharmacist. Follow medication instructions as directed by your provider and/or pharmacist. Please keep your medication list with you and share with your provider. Update the information when medications are discontinued, doses are changed, or new medications (including over-the-counter products) are added; and carry medication information at all times in the event of emergency situations     Allergies:  AMOXICILLIN - (reactions not documented)     HYDROCODONE - (reactions not documented)               Medications  Valid as of: June 28, 2017 -  1:14 PM    Generic Name Brand Name Tablet Size Instructions for use    AmLODIPine Besylate (Tab) NORVASC 5 MG Take 1 Tab by mouth every day.        Aspirin (Tab) aspirin 81 MG Take 81 mg by mouth every day.        Atorvastatin Calcium (Tab) LIPITOR 40 MG TAKE 1 TABLET EVERY DAY        Blood Glucose Monitoring Suppl (Misc) Blood Glucose Monitoring Suppl SUPPLIES Test strips order: Test strips for Accucheck Marie meter.   Patient to check blood sugars 4 times per day        HydroCHLOROthiazide (Tab) HYDRODIURIL 25 MG TAKE 1 TABLET BY MOUTH EVERY DAY        Insulin NPH Human (Isophane) (Suspension) HUMULIN,NOVOLIN 100 UNIT/ML Inject 28 Units as instructed 2 Times a Day.        Insulin Regular Human (Solution) HUMULIN R 100 Unit/mL Inject 16 Units as instructed 3 times a day before meals.        Insulin Syringe-Needle U-100 (Misc) INSULIN SYRINGE .3CC/31GX5/16\" 31G X 5/16\" 0.3 ML 1 Syringe by Does not apply route 5 Times a Day.        Lancets (Misc) Lancets  Lancets order:   Accucheck Soft Clix lancets  Testing blood sugars 5 times per day for insulin " adjustment.        Lisinopril (Tab) PRINIVIL, ZESTRIL 40 MG Take 1 Tab by mouth every day.        MetFORMIN HCl (TABLET SR 24 HR) Metformin HCl 1000 MG Take 1,000 mg by mouth 2 times a day.        PARoxetine HCl (Tab) PAXIL 30 MG TAKE 1 TABLET BY MOUTH EVERY DAY WITH FOOD        Pramipexole Dihydrochloride (Tab) MIRAPEX 1 MG Take 1 Tab by mouth 3 times a day.        .                 Medicines prescribed today were sent to:     [facility] [address], [place] - [address]    [address] [address]    Phone: [phone] Fax: [phone]    Open 24 Hours?: No    [facility] [address] [place] [address] AT [address] [address]    Phone: [phone] Fax: [phone]    Open 24 Hours?: No      Medication refill instructions:       If your prescription bottle indicates you have medication refills left, it is not necessary to call your provider’s office. Please contact your pharmacy and they will refill your medication.    If your prescription bottle indicates you do not have any refills left, you may request refills at any time through one of the following ways: The online [employer] system (except Urgent Care), by calling your provider’s office, or by asking your pharmacy to contact your provider’s office with a refill request. Medication refills are processed only during regular business hours and may not be available until the next business day. Your provider may request additional information or to have a follow-up visit with you prior to refilling your medication.   *Please Note: Medication refills are assigned a new Rx number when refilled electronically. Your pharmacy may indicate that no refills were authorized even though a new prescription for the same medication is available at the pharmacy. Please request the medicine by name with the pharmacy before contacting your provider for a refill.        Your To Do  List     Future Labs/Procedures Complete By Expires    BASIC METABOLIC PANEL  As directed 6/28/2018    CT-RENAL COLIC EVALUATION(A/P W/O)  As directed 6/28/2018         MyChart Access Code: Activation code not generated  Current [employer] Status: Active

## 2017-06-29 NOTE — PROGRESS NOTES
"Subjective:      Carlos Pulido is a 66 y.o. male who presents with Flank Pain            HPI  3 months waxing and waning flank pain. Currently R>L. Moderate severity. +radiation to right abdomen. Pain is not positional. No N/V. No diarrhea. Known hematuria/proteinuria with urology following. No known PMH kidney disease or kidney stones.     Review of Systems   Constitutional: Negative for weight loss and malaise/fatigue.   Eyes: Negative for discharge and redness.   Gastrointestinal: Negative for diarrhea, constipation and blood in stool.   Genitourinary: Negative for dysuria and urgency.   Skin: Negative for rash.   Neurological: Negative for sensory change and focal weakness.   .  Medications, Allergies, and current problem list reviewed today in Epic         Objective:     /78 mmHg  Pulse 97  Temp(Src) 37.1 °C (98.7 °F)  Resp 16  Ht 1.702 m (5' 7\")  Wt 97.523 kg (215 lb)  BMI 33.67 kg/m2  SpO2 94%     Physical Exam   Constitutional: He appears well-developed and well-nourished. No distress.   HENT:   Head: Normocephalic and atraumatic.   Mouth/Throat: Oropharynx is clear and moist.   Eyes: Conjunctivae are normal.   Cardiovascular: Normal rate, regular rhythm and normal heart sounds.    Pulmonary/Chest: Effort normal and breath sounds normal. He has no wheezes.   Abdominal: Soft. Bowel sounds are normal. He exhibits no mass. There is no tenderness. There is no guarding.   Genitourinary:   Area of perceived pain right CVA and lateral right abdomen. No point tenderness   Musculoskeletal: He exhibits no edema or tenderness.   Neurological:   Speech is clear. Patient is appropriate and cooperative.     Skin: Skin is warm and dry. No rash noted.               Assessment/Plan:   UA reviewed  BMP reviewed noting elevated blood sugar and slightly decreased renal function  CT per radiology:  1.  Negative for renal or ureteral calculi. No acute abnormality.    2.  Aortic and branch vessel atherosclerotic " plaque    3.  Colonic diverticulosis without diverticulitis      1. Right flank pain    - CT-RENAL COLIC EVALUATION(A/P W/O); Future  - POCT Urinalysis    2. Proteinuria, unspecified type    - BASIC METABOLIC PANEL; Future    3. Hematuria    - CT-RENAL COLIC EVALUATION(A/P W/O); Future  - BASIC METABOLIC PANEL; Future      Recommend f/u with pcp  F/u with urology as scheduled.

## 2017-07-11 ENCOUNTER — HOSPITAL ENCOUNTER (OUTPATIENT)
Dept: RADIOLOGY | Facility: MEDICAL CENTER | Age: 67
End: 2017-07-11
Attending: UROLOGY
Payer: MEDICARE

## 2017-07-11 DIAGNOSIS — R31.0 GROSS HEMATURIA: ICD-10-CM

## 2017-07-11 PROCEDURE — 72197 MRI PELVIS W/O & W/DYE: CPT

## 2017-07-11 PROCEDURE — 74183 MRI ABD W/O CNTR FLWD CNTR: CPT

## 2017-07-11 PROCEDURE — A9577 INJ MULTIHANCE: HCPCS | Performed by: UROLOGY

## 2017-07-11 PROCEDURE — 700117 HCHG RX CONTRAST REV CODE 255: Performed by: UROLOGY

## 2017-07-11 RX ADMIN — GADOBENATE DIMEGLUMINE 10 ML: 529 INJECTION, SOLUTION INTRAVENOUS at 14:49

## 2017-07-12 ENCOUNTER — TELEPHONE (OUTPATIENT)
Dept: MEDICAL GROUP | Facility: PHYSICIAN GROUP | Age: 67
End: 2017-07-12

## 2017-07-12 ENCOUNTER — PATIENT OUTREACH (OUTPATIENT)
Dept: HEALTH INFORMATION MANAGEMENT | Facility: OTHER | Age: 67
End: 2017-07-12

## 2017-07-12 DIAGNOSIS — Z13.5 SCREENING FOR DIABETIC RETINOPATHY: ICD-10-CM

## 2017-07-12 NOTE — PROGRESS NOTES
7/12/17   -    WebIZ Checked & Epic Updated: yes  HealthConnect Verified: no  Verify PCP: yes    Communication Preference Obtained: yes     Review Care Team: yes    Annual Wellness Visit Scheduling  1. Scheduling Status:Scheduled     Care Gap Scheduling (Attempt to Schedule EACH Overdue Care Gap!)     Health Maintenance Due   Topic Date Due   • Annual Wellness Visit  All care gaps scheduled   • IMM PNEUMOCOCCAL 65+ (ADULT) LOW/MEDIUM RISK SERIES (2 of 2 - PCV13)    • RETINAL SCREENING           [employer] Activation: already active  [email or web address] Melquiades: yes  Virtual Visits: yes  Opt In to Text Messages: yes

## 2017-07-25 RX ORDER — AMLODIPINE BESYLATE 5 MG/1
TABLET ORAL
Qty: 90 TAB | Refills: 1 | Status: SHIPPED | OUTPATIENT
Start: 2017-07-25 | End: 2017-12-18 | Stop reason: SDUPTHER

## 2017-07-25 NOTE — TELEPHONE ENCOUNTER
Refill X 6 months, sent to pharmacy.Pt. Seen in the last 6 months per protocol.   Lab Results   Component Value Date/Time    SODIUM 134* 06/28/2017 11:50 AM    POTASSIUM 3.6 06/28/2017 11:50 AM    CHLORIDE 98 06/28/2017 11:50 AM    CO2 26 06/28/2017 11:50 AM    GLUCOSE 256* 06/28/2017 11:50 AM    BUN 20 06/28/2017 11:50 AM    CREATININE 1.29 06/28/2017 11:50 AM

## 2017-09-15 RX ORDER — LISINOPRIL 40 MG/1
TABLET ORAL
Qty: 90 TAB | Refills: 0 | Status: SHIPPED | OUTPATIENT
Start: 2017-09-15 | End: 2017-10-31 | Stop reason: SDUPTHER

## 2017-09-15 RX ORDER — PAROXETINE 30 MG/1
TABLET, FILM COATED ORAL
Qty: 90 TAB | Refills: 0 | Status: SHIPPED | OUTPATIENT
Start: 2017-09-15 | End: 2017-10-31 | Stop reason: SDUPTHER

## 2017-10-03 ENCOUNTER — APPOINTMENT (OUTPATIENT)
Dept: MEDICAL GROUP | Facility: PHYSICIAN GROUP | Age: 67
End: 2017-10-03
Payer: MEDICARE

## 2017-10-14 ENCOUNTER — OFFICE VISIT (OUTPATIENT)
Dept: MEDICAL GROUP | Facility: PHYSICIAN GROUP | Age: 67
End: 2017-10-14
Payer: MEDICARE

## 2017-10-14 VITALS
DIASTOLIC BLOOD PRESSURE: 86 MMHG | TEMPERATURE: 98.8 F | HEART RATE: 99 BPM | WEIGHT: 213 LBS | HEIGHT: 68 IN | OXYGEN SATURATION: 98 % | BODY MASS INDEX: 32.28 KG/M2 | SYSTOLIC BLOOD PRESSURE: 150 MMHG

## 2017-10-14 DIAGNOSIS — F51.04 CHRONIC INSOMNIA: ICD-10-CM

## 2017-10-14 DIAGNOSIS — G25.81 RESTLESS LEGS SYNDROME (RLS): ICD-10-CM

## 2017-10-14 DIAGNOSIS — R79.89 LOW TESTOSTERONE: ICD-10-CM

## 2017-10-14 DIAGNOSIS — K64.4 EXTERNAL HEMORRHOID, BLEEDING: ICD-10-CM

## 2017-10-14 DIAGNOSIS — Z00.00 MEDICARE ANNUAL WELLNESS VISIT, INITIAL: Primary | ICD-10-CM

## 2017-10-14 DIAGNOSIS — G47.33 OSA (OBSTRUCTIVE SLEEP APNEA): Chronic | ICD-10-CM

## 2017-10-14 DIAGNOSIS — F41.9 ANXIETY: Chronic | ICD-10-CM

## 2017-10-14 DIAGNOSIS — Z91.148 NON COMPLIANCE W MEDICATION REGIMEN: ICD-10-CM

## 2017-10-14 DIAGNOSIS — E66.9 OBESITY (BMI 30.0-34.9): ICD-10-CM

## 2017-10-14 PROCEDURE — G0438 PPPS, INITIAL VISIT: HCPCS | Performed by: NURSE PRACTITIONER

## 2017-10-14 ASSESSMENT — PATIENT HEALTH QUESTIONNAIRE - PHQ9: CLINICAL INTERPRETATION OF PHQ2 SCORE: 0

## 2017-10-14 NOTE — PROGRESS NOTES
"CC:   Medicare Annual Wellness Visit    HPI:  Carlos is a 67 y.o. here for Medicare Annual Wellness Visit    Patient Active Problem List    Diagnosis Date Noted   • Uncontrolled type 2 diabetes mellitus with insulin therapy (CMS-[facility]) 03/23/2017   • Non compliance w medication regimen 03/23/2017   • Obesity (BMI 30.0-34.9) 09/23/2015   • External hemorrhoid, bleeding 07/06/2015   • Chronic insomnia 07/06/2015   • Hypertension 04/16/2013   • Restless legs syndrome (RLS) 04/16/2013   • PATTI (obstructive sleep apnea) 04/16/2013   • Low testosterone 04/16/2013   • Anxiety 04/16/2013   • S/P knee replacement 04/16/2013     Current Outpatient Prescriptions   Medication Sig Dispense Refill   • lisinopril (PRINIVIL, ZESTRIL) 40 MG tablet TAKE ONE TABLET BY MOUTH ONCE DAILY 90 Tab 0   • paroxetine (PAXIL) 30 MG Tab TAKE ONE TABLET BY MOUTH ONCE DAILY WITH FOOD 90 Tab 0   • amlodipine (NORVASC) 5 MG Tab TAKE ONE TABLET BY MOUTH ONCE DAILY 90 Tab 1   • Lancets Misc Lancets order:   Accucheck Soft Clix lancets  Testing blood sugars 5 times per day for insulin adjustment. 1 Each 12   • insulin regular (HUMULIN R) 100 Unit/mL Solution Inject 16 Units as instructed 3 times a day before meals. (Patient taking differently: Inject 16 Units as instructed 3 times a day before meals. RELION BRAND INSULIN AT [facility]) 20 mL 5   • insulin NPH (HUMULIN,NOVOLIN) 100 UNIT/ML Suspension Inject 28 Units as instructed 2 Times a Day. (Patient taking differently: Inject 28 Units as instructed 2 Times a Day. RELION BRAND AT [facility]) 20 mL 5   • Insulin Syringe-Needle U-100 (INSULIN SYRINGE .3CC/31GX5/16\") 31G X 5/16\" 0.3 ML Misc 1 Syringe by Does not apply route 5 Times a Day. 200 Each 5   • Blood Glucose Monitoring Suppl SUPPLIES Misc Test strips order: Test strips for Accucheck Marie meter.   Patient to check blood sugars 4 times per day 150 Each 6   • hydrochlorothiazide (HYDRODIURIL) 25 MG Tab TAKE 1 TABLET BY MOUTH EVERY DAY 90 Tab 1   • " aspirin 81 MG tablet Take 81 mg by mouth every day.     • atorvastatin (LIPITOR) 40 MG Tab TAKE 1 TABLET EVERY DAY 90 Tab 3   • metformin ER 1000 MG TABLET SR 24 HR Take 1,000 mg by mouth 2 times a day. 180 Tab 2   • pramipexole (MIRAPEX) 1 MG Tab Take 1 Tab by mouth 3 times a day. 90 Tab 2     No current facility-administered medications for this visit.       Current supplements: no  Chronic narcotic pain medicines: no  Allergies: Amoxicillin and Hydrocodone  Exercise: yes as robyn  Current social contact/activities: Has support of family and friends      Screening:  Depression Screening    Little interest or pleasure in doing things?  0 - not at all  Feeling down, depressed , or hopeless? 0 - not at all  Patient Health Questionnaire Score: 0     If depressive symptoms identified deferred to follow up visit unless specifically addressed in assessment and plan.    Interpretation of PHQ-9 Total Score   Score Severity   1-4 No Depression   5-9 Mild Depression   10-14 Moderate Depression   15-19 Moderately Severe Depression   20-27 Severe Depression    Screening for Cognitive Impairment    Three Minute Recall (apple, watch, wilber)  3/3    Draw clock face with all 12 numbers set to the hand to show 10 minutes past 11 o'clock  0 3  Cognitive concerns identified deferred for follow up unless specifically addressed in assessment and plan.    Fall Risk Assessment    Has the patient had two or more falls in the last year or any fall with injury in the last year?  No    Safety Assessment    Throw rugs on floor.  Yes  Handrails on all stairs.  Yes  Good lighting in all hallways.  Yes  Difficulty hearing.  No  Patient counseled about all safety risks that were identified.    Functional Assessment ADLs    Are there any barriers preventing you from cooking for yourself or meeting nutritional needs?  No.    Are there any barriers preventing you from driving safely or obtaining transportation?  No.    Are there any barriers  preventing you from using a telephone or calling for help?  No.    Are there any barriers preventing you from shopping?  No.    Are there any barriers preventing you from taking care of your own finances?  No.    Are there any barriers preventing you from managing your medications?  Yes. Occasionally forgets to take morning medications.   Are currently engaging any exercise or physical activity?  Yes.       Health Maintenance Summary                Annual Wellness Visit Overdue 1950     IMM PNEUMOCOCCAL 65+ (ADULT) LOW/MEDIUM RISK SERIES Overdue 7/6/2016      Done 7/6/2015 Imm Admin: Pneumococcal polysaccharide vaccine (PPSV-23)    RETINAL SCREENING Overdue 9/15/2016      Done 9/15/2015      Patient has more history with this topic...    DIABETES MONOFILAMENT / LE EXAM Overdue 8/9/2017      Done 8/9/2016 AMB DIABETIC MONOFILAMENT LOWER EXTREMITY EXAM     Patient has more history with this topic...    IMM INFLUENZA Overdue 9/1/2017     A1C SCREENING Next Due 12/8/2017      Done 6/8/2017 HEMOGLOBIN A1C     Patient has more history with this topic...    FASTING LIPID PROFILE Next Due 4/7/2018      Done 4/7/2017 LIPID PROFILE     Patient has more history with this topic...    URINE ACR / MICROALBUMIN Next Due 4/7/2018      Done 4/7/2017 MICROALBUMIN CREAT RATIO URINE (A)     Patient has more history with this topic...    SERUM CREATININE Next Due 6/28/2018      Done 6/28/2017 BASIC METABOLIC PANEL (A)     Patient has more history with this topic...    COLONOSCOPY Next Due 4/16/2021      Done 4/16/2011 banner    IMM DTaP/Tdap/Td Vaccine Next Due 3/10/2024      Done 3/10/2014 Imm Admin: Tdap Vaccine          Patient Care Team:  MERARY Morillo as PCP - General (Family Medicine)  Maria D Rhodes M.D. as Consulting Physician (Endocrinology)  Hossein Sandra M.D. as Consulting Physician (Orthopaedics)  Douglas Nielsen M.D. as Consulting Physician (Urology)        Social History   Substance Use Topics   •  "Smoking status: Never Smoker   • Smokeless tobacco: Never Used   • Alcohol use 0.0 oz/week      Comment: 1-2 per week       Family History   Problem Relation Age of Onset   • Diabetes Mother    • Diabetes Father    • Diabetes Sister    • Heart Disease Neg Hx    • Heart Failure Neg Hx      He  has a past medical history of Anxiety (4/16/2013); Hypertension (4/16/2013); Low testosterone (4/16/2013); PATTI (obstructive sleep apnea) (4/16/2013); and Restless legs syndrome (RLS) (4/16/2013).   Past Surgical History:   Procedure Laterality Date   • ATHROPLASTY      bilateral knee replacement       ROS:    Ostomy or other tubes or amputations: no  Chronic oxygen use no  Last eye exam 2016  : Denies incontinence.   Gait: Uses no assistive device   Hearing excellent.    Dentition good    Exam: Blood pressure 150/86, pulse 99, temperature 37.1 °C (98.8 °F), height 1.715 m (5' 7.5\"), weight 96.6 kg (213 lb), SpO2 98 %. Body mass index is 32.87 kg/m².  Alert, oriented in no acute distress.  Eye contact is good, speech goal directed, affect calm      Assessment and Plan. The following treatment and monitoring plan is recommended for all problems as listed below:   Hypertension  Chronic condition managed with current medical regimen  Stable per review   Continue with current meds  Followed by pcp     PATTI (obstructive sleep apnea)  Cont to use dental device   Followed by pcp    Anxiety  Chronic condition managed with current medical regimen  Stable per review   Continue with current meds  Followed by pcp       Restless legs syndrome (RLS)  Chronic condition managed with current medical regimen  Stable per review   Continue with current meds, effective ???????????????????  Followed by pcp     Low testosterone  Followed by pcp    S/P knee replacement  Since knee replacement R good mobility, L knee replacement beginning to cause some instability  Pt will be contacting  ortho    External hemorrhoid, bleeding  Chronic condition managed " with current medical regimen  Stable per review at this time   Continue with current OTC treatment  Followed by pcp     Obesity (BMI 30.0-34.9)  Patient aware of relationship between weight and health and working on diet  Followed by pcp    Uncontrolled type 2 diabetes mellitus with insulin therapy (CMS-HCC)  Chronic condition managed with current medical regimen  6/28/2017   Glucose 256  65 - 99 mg/dL Final   4/7/2017   Glycohemoglobin 9.6  0.0 - 5.6 % Final    Continue with current meds, exercise and diet management  Followed by endocrinology.        Non compliance w medication regimen  Managed by endocrinology and pcp  Discussed impt of taking medication prescribed, diet and exercise for health and prevention of end organ disease    Chronic insomnia  Chronic condition managed with current medical regimen  Stable per review   Continue with surveillance  Followed by pcp..          Health Care Screening recommendations reviewed with patient today: per Patient Instructions  DPA/Advanced directive: .has NO advanced directive - not interested in additional information    Next office visit for recheck of chronic medical conditions is due with Lynette REAGAN 11/3/2018    Monofilament testing with a 10 gram force: sensation intact: intact bilaterally  Visual Inspection: Feet without maceration, ulcers, fissures.  Pedal pulses: intact bilaterally

## 2017-10-14 NOTE — ASSESSMENT & PLAN NOTE
Since knee replacement R good mobility, L knee replacement beginning to cause some instability  Pt will be contacting  ortho

## 2017-10-14 NOTE — ASSESSMENT & PLAN NOTE
Chronic condition managed with current medical regimen  Stable per review   Continue with current meds, effective ???????????????????  Followed by pcp

## 2017-10-14 NOTE — ASSESSMENT & PLAN NOTE
Chronic condition managed with current medical regimen  Stable per review   Continue with current meds  Followed by pcp

## 2017-10-14 NOTE — ASSESSMENT & PLAN NOTE
Chronic condition managed with current medical regimen  6/28/2017   Glucose 256  65 - 99 mg/dL Final   4/7/2017   Glycohemoglobin 9.6  0.0 - 5.6 % Final    Continue with current meds, exercise and diet management  Followed by endocrinology.

## 2017-10-14 NOTE — ASSESSMENT & PLAN NOTE
Chronic condition managed with current medical regimen  Stable per review at this time   Continue with current OTC treatment  Followed by pcp

## 2017-10-14 NOTE — ASSESSMENT & PLAN NOTE
Chronic condition managed with current medical regimen  Stable per review   Continue with surveillance  Followed by pcp..

## 2017-10-14 NOTE — ASSESSMENT & PLAN NOTE
Managed by endocrinology and pcp  Discussed impt of taking medication prescribed, diet and exercise for health and prevention of end organ disease

## 2017-10-14 NOTE — PATIENT INSTRUCTIONS
Continue with care through MERARY Morillo.  Next Medicare Annual Wellness Visit is due in one year.    Continue care with specialists you are seeing for your chronic problems.    Attached is some preventative information for you to read.          Fall Prevention and Home Safety  Falls cause injuries and can affect all age groups. It is possible to prevent falls.   HOW TO PREVENT FALLS  · Wear shoes with rubber soles that do not have an opening for your toes.   · Keep the inside and outside of your house well lit.   · Use night lights throughout your home.   · Remove clutter from floors.   · Clean up floor spills.   · Remove throw rugs or fasten them to the floor with carpet tape.   · Do not place electrical cords across pathways.   · Put grab bars by your tub, shower, and toilet. Do not use towel bars as grab bars.   · Put handrails on both sides of the stairway. Fix loose handrails.   · Do not climb on stools or stepladders, if possible.   · Do not wax your floors.   · Repair uneven or unsafe sidewalks, walkways, or stairs.   · Keep items you use a lot within reach.   · Be aware of pets.   · Keep emergency numbers next to the telephone.   · Put smoke detectors in your home and near bedrooms.   Ask your doctor what other things you can do to prevent falls.  Document Released: 10/14/2010 Document Revised: 06/18/2013 Document Reviewed: 03/19/2013  ExitCare® Patient Information ©2013 [employer].    Exercise to Stay Healthy      Exercise helps you become and stay healthy.  EXERCISE IDEAS AND TIPS  Choose exercises that:  · You enjoy.   · Fit into your day.   You do not need to exercise really hard to be healthy. You can do exercises at a slow or medium level and stay healthy. You can:  · Stretch before and after working out.   · Try yoga, Pilates, or maria chi.   · Lift weights.   · Walk fast, swim, jog, run, climb stairs, bicycle, dance, or rollerskate.   · Take aerobic classes.   Exercises that burn about  150 calories:  · Running 1 ½ miles in 15 minutes.   · Playing volleyball for 45 to 60 minutes.   · Washing and waxing a car for 45 to 60 minutes.   · Playing touch football for 45 minutes.   · Walking 1 ¾ miles in 35 minutes.   · Pushing a stroller 1 ½ miles in 30 minutes.   · Playing basketball for 30 minutes.   · Raking leaves for 30 minutes.   · Bicycling 5 miles in 30 minutes.   · Walking 2 miles in 30 minutes.   · Dancing for 30 minutes.   · Shoveling snow for 15 minutes.   · Swimming laps for 20 minutes.   · Walking up stairs for 15 minutes.   · Bicycling 4 miles in 15 minutes.   · Gardening for 30 to 45 minutes.   · Jumping rope for 15 minutes.   · Washing windows or floors for 45 to 60 minutes.     One suggestion is to start walking for 10 minutes after each meal.  This will help with digestion and help you to metabolize your meal.       For Weight Loss -   Recommend portion sizes with more fruits/vegetables/high fiber foods.  Stay away from white bread/pastas/white rice/white potatoes.  Increase Fluid intake to 6-8 glasses of water daily.   Eliminate soda's (diet and regular) from your fluid intake.      Document Released: 01/20/2012 Document Revised: 03/11/2013 Document Reviewed: 01/20/2012  ExitCare® Patient Information ©2013 [employer].    Fat and Cholesterol Control Diet  Cholesterol is a wax-like substance. It comes from your liver and is found in certain foods. There is good (HDL) and bad (LDL) cholesterol. Too much cholesterol in your blood can affect your heart. Certain foods can lower or raise your cholesterol. Eat foods that are low in cholesterol.  Saturated and trans fats are bad fats found in foods that will raise your cholesterol. Do not eat foods that are high in saturated and trans fats.  FOODS HIGHER IN SATURATED AND TRANS FATS  · Dairy products, such as whole milk, eggs, cheese, cream, and butter.   · Fatty meats, such as hot dogs, sausage, and salami.   · Fried foods.   · Trans fats  which are found in margarine and pre-made cookies, crackers, and baked goods.   · Tropical oils, such as coconut and palm oils.   Read package labels at the store. Do not buy products that use saturated or trans fats or hydrogenated oils. Find foods labeled:  · Low-fat.   · Low-saturated fat.   · Trans-fat-free.   · Low-cholesterol.   FOODS LOWER IN CHOLESTEROL  ·  Fruit.   · Vegetables.   · Beans, peas, and lentils.   · Fish.   · Lean meat, such as chicken (without skin) or ground turkey.   · Grains, such as barley, rice, couscous, bulgur wheat, and pasta.   · Heart-healthy tub margarine.   PREPARING YOUR FOOD  · Broil, bake, steam, or roast foods. Do not lucas food.   · Use non-stick cooking sprays.   · Use lemon or herbs to flavor food instead of using butter or stick margarine.   · Use nonfat yogurt, salsa, or low-fat dressings for salads.   Document Released: 06/18/2013 Document Reviewed: 06/18/2013  ExitCare® Patient Information ©2013 [employer].    Recommend annual flu vaccine.  Next due in Fall, 2017.  If you decide not to have the flu vaccine, recommend good handwashing and staying out of crowds during flu season.       Basic Carbohydrate Counting for Diabetes Mellitus  Carbohydrate counting is a method for keeping track of the amount of carbohydrates you eat. Eating carbohydrates naturally increases the level of sugar (glucose) in your blood, so it is important for you to know the amount that is okay for you to have in every meal. Carbohydrate counting helps keep the level of glucose in your blood within normal limits. The amount of carbohydrates allowed is different for every person. A dietitian can help you calculate the amount that is right for you. Once you know the amount of carbohydrates you can have, you can count the carbohydrates in the foods you want to eat.  Carbohydrates are found in the following foods:  · Grains, such as breads and cereals.  · Dried beans and soy products.  · Starchy  "vegetables, such as potatoes, peas, and corn.  · Fruit and fruit juices.  · Milk and yogurt.  · Sweets and snack foods, such as cake, cookies, candy, chips, soft drinks, and fruit drinks.  CARBOHYDRATE COUNTING  There are two ways to count the carbohydrates in your food. You can use either of the methods or a combination of both.  Reading the \"Nutrition Facts\" on Packaged Food  The \"Nutrition Facts\" is an area that is included on the labels of almost all packaged food and beverages in the [place]. It includes the serving size of that food or beverage and information about the nutrients in each serving of the food, including the grams (g) of carbohydrate per serving.   Decide the number of servings of this food or beverage that you will be able to eat or drink. Multiply that number of servings by the number of grams of carbohydrate that is listed on the label for that serving. The total will be the amount of carbohydrates you will be having when you eat or drink this food or beverage.  Learning Standard Serving Sizes of Food  When you eat food that is not packaged or does not include \"Nutrition Facts\" on the label, you need to measure the servings in order to count the amount of carbohydrates. A serving of most carbohydrate-rich foods contains about 15 g of carbohydrates. The following list includes serving sizes of carbohydrate-rich foods that provide 15 g of carbohydrate per serving:    · 1 slice of bread (1 oz) or 1 six-inch tortilla.    · ½ of a hamburger bun or English muffin.  · 4-6 crackers.  · ¾ cup unsweetened dry cereal.    · ½ cup hot cereal.  ·  cup rice or pasta.    · ½ cup mashed potatoes or ¼ of a large baked potato.  · 1 cup fresh fruit or one small piece of fruit.    · ½ cup canned or frozen fruit or fruit juice.  · 1 cup milk.  ·  cup plain fat-free yogurt or yogurt sweetened with artificial sweeteners.  · ½ cup cooked dried beans or starchy vegetable, such as peas, corn, or potatoes. "    Decide the number of standard-size servings that you will eat. Multiply that number of servings by 15 (the grams of carbohydrates in that serving). For example, if you eat 2 cups of strawberries, you will have eaten 2 servings and 30 g of carbohydrates (2 servings x 15 g = 30 g). For foods such as soups and casseroles, in which more than one food is mixed in, you will need to count the carbohydrates in each food that is included.  EXAMPLE OF CARBOHYDRATE COUNTING  Sample Dinner   · 3 oz chicken breast.  ·  cup of brown rice.  · ½ cup of corn.  · 1 cup milk.    · 1 cup strawberries with sugar-free whipped topping.    Carbohydrate Calculation   Step 1: Identify the foods that contain carbohydrates:   · Rice.    · Corn.    · Milk.    · Strawberries.  Step 2: Calculate the number of servings eaten of each:   · 2 servings of rice.    · 1 serving of corn.    · 1 serving of milk.    · 1 serving of strawberries.  Step 3:  Multiply each of those number of servings by 15 g:   · 2 servings of rice x 15 g = 30 g.    · 1 serving of corn x 15 g = 15 g.    · 1 serving of milk x 15 g = 15 g.    · 1 serving of strawberries x 15 g = 15 g.  Step 4: Add together all of the amounts to find the total grams of carbohydrates eaten: 30 g + 15 g + 15 g + 15 g = 75 g.     This information is not intended to replace advice given to you by your health care provider. Make sure you discuss any questions you have with your health care provider.     Document Released: 12/18/2006 Document Revised: 01/08/2016 Document Reviewed: 11/14/2014  [employer] Interactive Patient Education ©2016 [employer]

## 2017-10-19 DIAGNOSIS — E78.2 MIXED HYPERLIPIDEMIA: ICD-10-CM

## 2017-10-20 RX ORDER — ATORVASTATIN CALCIUM 40 MG/1
TABLET, FILM COATED ORAL
Qty: 90 TAB | Refills: 1 | Status: SHIPPED | OUTPATIENT
Start: 2017-10-20 | End: 2017-12-18 | Stop reason: SDUPTHER

## 2017-11-01 NOTE — TELEPHONE ENCOUNTER
Was the patient seen in the last year in this department? Yes     Does patient have an active prescription for medications requested? No     Received Request Via: Pharmacy      Pt met protocol?: Yes, OV couple weeks ago   BP Readings from Last 1 Encounters:   10/14/17 150/86

## 2017-11-02 RX ORDER — LISINOPRIL 40 MG/1
TABLET ORAL
Qty: 90 TAB | Refills: 0 | Status: SHIPPED | OUTPATIENT
Start: 2017-11-02 | End: 2017-12-18 | Stop reason: SDUPTHER

## 2017-11-02 RX ORDER — HYDROCHLOROTHIAZIDE 25 MG/1
TABLET ORAL
Qty: 90 TAB | Refills: 0 | Status: SHIPPED | OUTPATIENT
Start: 2017-11-02 | End: 2017-12-18 | Stop reason: SDUPTHER

## 2017-11-02 RX ORDER — PAROXETINE 30 MG/1
TABLET, FILM COATED ORAL
Qty: 90 TAB | Refills: 0 | Status: SHIPPED | OUTPATIENT
Start: 2017-11-02 | End: 2017-12-18 | Stop reason: SDUPTHER

## 2017-11-18 ENCOUNTER — OFFICE VISIT (OUTPATIENT)
Dept: URGENT CARE | Facility: PHYSICIAN GROUP | Age: 67
End: 2017-11-18
Payer: MEDICARE

## 2017-11-18 VITALS
HEART RATE: 82 BPM | SYSTOLIC BLOOD PRESSURE: 142 MMHG | RESPIRATION RATE: 14 BRPM | BODY MASS INDEX: 33.27 KG/M2 | TEMPERATURE: 98.6 F | HEIGHT: 67 IN | WEIGHT: 212 LBS | OXYGEN SATURATION: 98 % | DIASTOLIC BLOOD PRESSURE: 86 MMHG

## 2017-11-18 DIAGNOSIS — G57.11 MERALGIA PARAESTHETICA, RIGHT: ICD-10-CM

## 2017-11-18 PROCEDURE — 99214 OFFICE O/P EST MOD 30 MIN: CPT | Performed by: PHYSICIAN ASSISTANT

## 2017-11-18 RX ORDER — TRAMADOL HYDROCHLORIDE 50 MG/1
TABLET ORAL
Qty: 15 TAB | Refills: 0 | Status: SHIPPED | OUTPATIENT
Start: 2017-11-18 | End: 2017-12-18

## 2017-11-18 RX ORDER — GABAPENTIN 100 MG/1
CAPSULE ORAL
Qty: 60 CAP | Refills: 0 | Status: SHIPPED | OUTPATIENT
Start: 2017-11-18 | End: 2017-12-18 | Stop reason: SDUPTHER

## 2017-11-18 ASSESSMENT — ENCOUNTER SYMPTOMS
BRUISES/BLEEDS EASILY: 0
HIP PAIN: 1
NEUROLOGICAL NEGATIVE: 1
CONSTITUTIONAL NEGATIVE: 1

## 2017-11-18 NOTE — PATIENT INSTRUCTIONS
Meralgia Paresthetica     Meralgia paresthetica (MP) is a disorder characterized by tingling, numbness, and burning pain in the outer side of the thigh. It occurs in men more than women. MP is generally found in middle-aged or overweight people. Sometimes, the disorder may disappear.  CAUSES  The disorder is caused by a nerve in the thigh being squeezed (compressed). MP may be associated with tight clothing, pregnancy, diabetes, and being overweight (obese).  SYMPTOMS  · Tingling, numbness, and burning in the outer thigh.   · An area of the skin may be painful and sensitive to the touch.   The symptoms often worsen after walking or standing.  TREATMENT   Treatment is based on your symptoms and is mainly supportive. Treatment may include:  · Wearing looser clothing.   · Losing weight.   · Avoiding prolonged standing or walking.   · Taking medication.   · Surgery if the pain is peristent or severe.   MP usually eases or disappears after treatment. Surgery is not always fully successful.  Document Released: 12/08/2003 Document Revised: 03/11/2013 Document Reviewed: 12/18/2006  [employer]® Patient Information ©2013 [employer].

## 2017-11-18 NOTE — PROGRESS NOTES
Subjective:      Carlos Pulido is a 67 y.o. male who presents with Hip Pain (pain from hip to knee x2 weeks)        Hip Pain     Patient presents today for right thigh pain radiating into his right knee, he feels the pain on the top of the thigh, slightly to the outer side.  Has noticed it in the last 4 days.  Pain is worse when trying to sleep at night. Movement, getting up and down does not seem to affect it much.  History of right knee replacement but has not caused issues.  Admits he has gained about 15 pounds in last year.  Feels it is mostly in his belly area.   Does have hx of DM.   He not noticed any rashes or lesions.  He denies back or buttock pain and has no chronic history of back pain.  He did have fall 2 days ago, more on the left side, shoulder doing better but this pain started before this fall.  He has take OTC without help of the pain.   Pain is burning in character but feels there is no tingling/numbness.     Review of Systems   Constitutional: Negative.    Musculoskeletal:        SEE HPI   Skin: Negative.    Neurological: Negative.    Endo/Heme/Allergies: Does not bruise/bleed easily.   All other systems reviewed and are negative.       PMH:  has a past medical history of Anxiety (4/16/2013); Hypertension (4/16/2013); Low testosterone (4/16/2013); PATTI (obstructive sleep apnea) (4/16/2013); and Restless legs syndrome (RLS) (4/16/2013).  MEDS:   Current Outpatient Prescriptions:   •  gabapentin (NEURONTIN) 100 MG Cap, 1 capsule at night x 3 days, increase to 200 x 30 days then 300 mg nightly., Disp: 60 Cap, Rfl: 0  •  tramadol (ULTRAM) 50 MG Tab, 1-2 tablets at bedtime prn severe pain.   No driving, Disp: 15 Tab, Rfl: 0  •  paroxetine (PAXIL) 30 MG Tab, TAKE ONE TABLET BY MOUTH ONCE DAILY WITH FOOD, Disp: 90 Tab, Rfl: 0  •  hydrochlorothiazide (HYDRODIURIL) 25 MG Tab, TAKE ONE TABLET BY MOUTH ONCE DAILY, Disp: 90 Tab, Rfl: 0  •  lisinopril (PRINIVIL, ZESTRIL) 40 MG tablet, TAKE ONE TABLET BY  "MOUTH ONCE DAILY, Disp: 90 Tab, Rfl: 0  •  atorvastatin (LIPITOR) 40 MG Tab, TAKE ONE TABLET BY MOUTH ONCE DAILY, Disp: 90 Tab, Rfl: 1  •  amlodipine (NORVASC) 5 MG Tab, TAKE ONE TABLET BY MOUTH ONCE DAILY, Disp: 90 Tab, Rfl: 1  •  Lancets Misc, Lancets order:   Accucheck Soft Clix lancets  Testing blood sugars 5 times per day for insulin adjustment., Disp: 1 Each, Rfl: 12  •  pramipexole (MIRAPEX) 1 MG Tab, Take 1 Tab by mouth 3 times a day., Disp: 90 Tab, Rfl: 2  •  Insulin Syringe-Needle U-100 (INSULIN SYRINGE .3CC/31GX5/16\") 31G X 5/16\" 0.3 ML Misc, 1 Syringe by Does not apply route 5 Times a Day., Disp: 200 Each, Rfl: 5  •  Blood Glucose Monitoring Suppl SUPPLIES Misc, Test strips order: Test strips for Accucheck Marie meter.   Patient to check blood sugars 4 times per day, Disp: 150 Each, Rfl: 6  •  aspirin 81 MG tablet, Take 81 mg by mouth every day., Disp: , Rfl:   •  metformin ER 1000 MG TABLET SR 24 HR, Take 1,000 mg by mouth 2 times a day., Disp: 180 Tab, Rfl: 2  ALLERGIES:   Allergies   Allergen Reactions   • Amoxicillin    • Hydrocodone      SURGHX:   Past Surgical History:   Procedure Laterality Date   • ATHROPLASTY      bilateral knee replacement     SOCHX:  reports that he has never smoked. He has never used smokeless tobacco. He reports that he drinks alcohol. He reports that he uses drugs, including Marijuana.  FH: Family history was reviewed, no pertinent findings to report     Objective:     /86   Pulse 82   Temp 37 °C (98.6 °F)   Resp 14   Ht 1.702 m (5' 7\")   Wt 96.2 kg (212 lb)   SpO2 98%   BMI 33.20 kg/m²      Physical Exam   Constitutional: He is oriented to person, place, and time. He appears well-developed and well-nourished. No distress.   HENT:   Head: Normocephalic and atraumatic.   Neck: Normal range of motion. Neck supple.   Cardiovascular: Normal rate and regular rhythm.    Pulmonary/Chest: Effort normal and breath sounds normal.   Abdominal: Soft. Bowel sounds are " normal. No hernia.   Musculoskeletal:        Right hip: He exhibits normal range of motion, normal strength, no tenderness, no bony tenderness and no swelling.        Lumbar back: He exhibits normal range of motion, no tenderness, no bony tenderness, no pain and no spasm.        Legs:  Neurological: He is alert and oriented to person, place, and time.   Skin: Skin is warm and dry. No rash noted.   Psychiatric: He has a normal mood and affect. His behavior is normal.   Vitals reviewed.          Assessment/Plan:     1. Meralgia paraesthetica, right  gabapentin (NEURONTIN) 100 MG Cap    tramadol (ULTRAM) 50 MG Tab       -patient location, character and risk factors most consistent with dx of meralgia paraesthetica today.   Increased abdominal habitus and patient diabetic included in these risks.    -patient requesting something stronger for bedtime and will initiate low dose trial of increasing Gabapentin at bedtime for nerve pain.   Tramadol for bedtime trial, caution drowsy  -patient has 1 month follow up with PCP he will be keeping.   -weight loss as main treatment advised and he expresses understanding.   -he is to RTC in meantime if worsening or changes in character.       Supportive care, differential diagnoses, and indications for immediate follow-up discussed with patient.   Pathogenesis of diagnosis discussed including typical length and natural progression.   Instructed to return to clinic or nearest emergency department for any change in condition, further concerns, or worsening of symptoms.  Patient states understanding of the plan of care and discharge instructions        Ila Biggs P.A.-C.

## 2017-12-18 ENCOUNTER — OFFICE VISIT (OUTPATIENT)
Dept: MEDICAL GROUP | Facility: PHYSICIAN GROUP | Age: 67
End: 2017-12-18
Payer: MEDICARE

## 2017-12-18 VITALS
WEIGHT: 213 LBS | TEMPERATURE: 99.4 F | RESPIRATION RATE: 16 BRPM | OXYGEN SATURATION: 95 % | SYSTOLIC BLOOD PRESSURE: 140 MMHG | DIASTOLIC BLOOD PRESSURE: 82 MMHG | BODY MASS INDEX: 33.43 KG/M2 | HEART RATE: 91 BPM | HEIGHT: 67 IN

## 2017-12-18 DIAGNOSIS — Z79.4 TYPE 2 DIABETES MELLITUS WITH HYPERGLYCEMIA, WITH LONG-TERM CURRENT USE OF INSULIN (HCC): ICD-10-CM

## 2017-12-18 DIAGNOSIS — Z23 NEED FOR INFLUENZA VACCINATION: ICD-10-CM

## 2017-12-18 DIAGNOSIS — F41.9 ANXIETY: Chronic | ICD-10-CM

## 2017-12-18 DIAGNOSIS — Z23 NEED FOR VACCINATION WITH 13-POLYVALENT PNEUMOCOCCAL CONJUGATE VACCINE: ICD-10-CM

## 2017-12-18 DIAGNOSIS — I10 ESSENTIAL HYPERTENSION: Chronic | ICD-10-CM

## 2017-12-18 DIAGNOSIS — M25.562 CHRONIC PAIN OF BOTH KNEES: ICD-10-CM

## 2017-12-18 DIAGNOSIS — H93.8X2 EAR FULLNESS, LEFT: ICD-10-CM

## 2017-12-18 DIAGNOSIS — E66.9 OBESITY (BMI 30.0-34.9): ICD-10-CM

## 2017-12-18 DIAGNOSIS — E78.2 MIXED HYPERLIPIDEMIA: ICD-10-CM

## 2017-12-18 DIAGNOSIS — G57.11 MERALGIA PARAESTHETICA, RIGHT: ICD-10-CM

## 2017-12-18 DIAGNOSIS — G25.81 RESTLESS LEGS SYNDROME (RLS): ICD-10-CM

## 2017-12-18 DIAGNOSIS — E11.65 TYPE 2 DIABETES MELLITUS WITH HYPERGLYCEMIA, WITH LONG-TERM CURRENT USE OF INSULIN (HCC): ICD-10-CM

## 2017-12-18 DIAGNOSIS — M25.561 CHRONIC PAIN OF BOTH KNEES: ICD-10-CM

## 2017-12-18 DIAGNOSIS — G89.29 CHRONIC PAIN OF BOTH KNEES: ICD-10-CM

## 2017-12-18 DIAGNOSIS — H93.8X1 EAR FULLNESS, RIGHT: ICD-10-CM

## 2017-12-18 PROCEDURE — 90670 PCV13 VACCINE IM: CPT | Performed by: INTERNAL MEDICINE

## 2017-12-18 PROCEDURE — 69210 REMOVE IMPACTED EAR WAX UNI: CPT | Performed by: INTERNAL MEDICINE

## 2017-12-18 PROCEDURE — G0008 ADMIN INFLUENZA VIRUS VAC: HCPCS | Performed by: INTERNAL MEDICINE

## 2017-12-18 PROCEDURE — 90662 IIV NO PRSV INCREASED AG IM: CPT | Performed by: INTERNAL MEDICINE

## 2017-12-18 PROCEDURE — G0009 ADMIN PNEUMOCOCCAL VACCINE: HCPCS | Performed by: INTERNAL MEDICINE

## 2017-12-18 PROCEDURE — 99204 OFFICE O/P NEW MOD 45 MIN: CPT | Mod: 25 | Performed by: INTERNAL MEDICINE

## 2017-12-18 RX ORDER — PRAMIPEXOLE DIHYDROCHLORIDE 1 MG/1
1 TABLET ORAL 3 TIMES DAILY
Qty: 90 TAB | Refills: 2 | Status: SHIPPED | OUTPATIENT
Start: 2017-12-18 | End: 2018-07-13

## 2017-12-18 RX ORDER — CALCIUM CARB/VITAMIN D3/VIT K1 500-100-40
1 TABLET,CHEWABLE ORAL
Qty: 200 EACH | Refills: 5 | Status: SHIPPED | OUTPATIENT
Start: 2017-12-18 | End: 2018-09-06 | Stop reason: SDUPTHER

## 2017-12-18 RX ORDER — HYDROCHLOROTHIAZIDE 25 MG/1
TABLET ORAL
Qty: 90 TAB | Refills: 3 | Status: SHIPPED | OUTPATIENT
Start: 2017-12-18 | End: 2018-09-06 | Stop reason: SDUPTHER

## 2017-12-18 RX ORDER — GABAPENTIN 100 MG/1
CAPSULE ORAL
Qty: 60 CAP | Refills: 0 | Status: SHIPPED | OUTPATIENT
Start: 2017-12-18 | End: 2018-02-05 | Stop reason: SDUPTHER

## 2017-12-18 RX ORDER — PAROXETINE HYDROCHLORIDE 20 MG/1
20 TABLET, FILM COATED ORAL DAILY
Qty: 90 TAB | Refills: 0 | Status: SHIPPED | OUTPATIENT
Start: 2017-12-18 | End: 2018-03-14 | Stop reason: SDUPTHER

## 2017-12-18 RX ORDER — HUMAN INSULIN 100 [IU]/ML
16 INJECTION, SOLUTION SUBCUTANEOUS
Qty: 10 ML | COMMUNITY
Start: 2017-12-18 | End: 2017-12-28

## 2017-12-18 RX ORDER — HUMAN INSULIN 100 [IU]/ML
INJECTION, SUSPENSION SUBCUTANEOUS
COMMUNITY
Start: 2017-11-30 | End: 2017-12-18

## 2017-12-18 RX ORDER — TRAMADOL HYDROCHLORIDE 50 MG/1
TABLET ORAL
Qty: 15 TAB | Refills: 0 | Status: CANCELLED
Start: 2017-12-18

## 2017-12-18 RX ORDER — ATORVASTATIN CALCIUM 40 MG/1
TABLET, FILM COATED ORAL
Qty: 90 TAB | Refills: 3 | Status: SHIPPED | OUTPATIENT
Start: 2017-12-18 | End: 2018-09-06 | Stop reason: SDUPTHER

## 2017-12-18 RX ORDER — AMLODIPINE BESYLATE 5 MG/1
TABLET ORAL
Qty: 90 TAB | Refills: 3 | Status: SHIPPED | OUTPATIENT
Start: 2017-12-18 | End: 2019-02-25 | Stop reason: SDUPTHER

## 2017-12-18 RX ORDER — HUMAN INSULIN 100 [IU]/ML
INJECTION, SOLUTION SUBCUTANEOUS
COMMUNITY
Start: 2017-11-30 | End: 2017-12-18

## 2017-12-18 RX ORDER — HUMAN INSULIN 100 [IU]/ML
INJECTION, SUSPENSION SUBCUTANEOUS
Qty: 10 ML | COMMUNITY
Start: 2017-12-18 | End: 2018-02-12

## 2017-12-18 RX ORDER — SYRINGE AND NEEDLE,INSULIN,1ML 31GX15/64"
SYRINGE, EMPTY DISPOSABLE MISCELLANEOUS
COMMUNITY
Start: 2017-10-31 | End: 2018-04-05 | Stop reason: SDUPTHER

## 2017-12-18 RX ORDER — LISINOPRIL 40 MG/1
TABLET ORAL
Qty: 90 TAB | Refills: 3 | Status: SHIPPED | OUTPATIENT
Start: 2017-12-18 | End: 2019-02-25 | Stop reason: SDUPTHER

## 2017-12-18 NOTE — PROGRESS NOTES
PRIMARY CARE CLINIC NEW PATIENT H&P  Chief Complaint   Patient presents with   • Medication Refill   • Follow-Up     was seen in  on 11/18 pt has ? on Meralgia paraesthetica       History of Present Illness     Meralgia paraesthetica, right  Was seen in urgent care about 2 weeks ago and had meralgia paresthetica explained to him. Was prescribed gabapentin in urgent care which was helping.     Uncontrolled type 2 diabetes mellitus with insulin therapy (CMS-[facility])  He wasn't compliant with diet until recently (was eating carbohydrates and tortillas). Is now more compliant with diet again.     Fasting sugars are now 170-180. Has been taking 16u regular insulin twice a day and 28 units of NPH twice a day. Isn't aware that his endocrinologist wanted him taking 16u regular insulin tid and 32 units of NPH when he saw him 6/2017. Didn't go to his 3 month follow up with his endocrinologist 9/2017 because he had business in [place] to do. Plans to reschedule and get back on track. Missed visit with his ophthalmologist this year because he thought there was a change in his coverage.     Obesity (BMI 30.0-34.9)  Has gained weight and says his ability to walk is limited by his bilateral knee replacements.     Hypertension  Has been out of his blood pressure medications for 3 days.     Anxiety  Has been on Paxil for anxiety and thinks it does help. Would like to wean off of Paxil.     Chronic pain of both knees  Has had bilateral knee replacements and is now having chronic pain since limiting his physical activity.     Restless legs syndrome (RLS)  Is out of pramipexole     Ear fullness, left  Has been having left ear fullness.     Current Outpatient Prescriptions   Medication Sig Dispense Refill   • gabapentin (NEURONTIN) 100 MG Cap 1 capsule at night x 3 days, increase to 200 x 30 days then 300 mg nightly. 60 Cap 0   • paroxetine (PAXIL) 20 MG Tab Take 1 Tab by mouth every day. 90 Tab 0   • lisinopril (PRINIVIL, ZESTRIL) 40 MG  "tablet TAKE ONE TABLET BY MOUTH ONCE DAILY 90 Tab 3   • hydrochlorothiazide (HYDRODIURIL) 25 MG Tab TAKE ONE TABLET BY MOUTH ONCE DAILY 90 Tab 3   • atorvastatin (LIPITOR) 40 MG Tab TAKE ONE TABLET BY MOUTH ONCE DAILY 90 Tab 3   • amlodipine (NORVASC) 5 MG Tab TAKE ONE TABLET BY MOUTH ONCE DAILY 90 Tab 3   • Insulin Syringe-Needle U-100 (INSULIN SYRINGE .3CC/31GX5/16\") 31G X 5/16\" 0.3 ML Misc 1 Syringe by Does not apply route 5 Times a Day. 200 Each 5   • pramipexole (MIRAPEX) 1 MG Tab Take 1 Tab by mouth 3 times a day. 90 Tab 2   • tramadol (ULTRAM) 50 MG Tab 1-2 tablets at bedtime prn severe pain.   No driving 15 Tab 0   • Lancets Misc Lancets order:   Accucheck Soft Clix lancets  Testing blood sugars 5 times per day for insulin adjustment. 1 Each 12   • Blood Glucose Monitoring Suppl SUPPLIES Misc Test strips order: Test strips for Accucheck Marie meter.   Patient to check blood sugars 4 times per day 150 Each 6   • aspirin 81 MG tablet Take 81 mg by mouth every day.     • metformin ER 1000 MG TABLET SR 24 HR Take 1,000 mg by mouth 2 times a day. 180 Tab 2     No current facility-administered medications for this visit.        Past Medical History:   Diagnosis Date   • Anxiety 4/16/2013    As needed for anxiety   • Hypertension 4/16/2013   • Low testosterone 4/16/2013   • Meralgia paraesthetica, right 12/18/2017   • PATTI (obstructive sleep apnea) 4/16/2013    Cannot tolerate sleep apnea   • Restless legs syndrome (RLS) 4/16/2013     Past Surgical History:   Procedure Laterality Date   • KNEE REPLACEMENT, TOTAL Right 2011    done in [facility]   • KNEE REPLACEMENT, TOTAL Left 2009    done in [facility]   • RHINOPLASTY       Social History   Substance Use Topics   • Smoking status: Never Smoker   • Smokeless tobacco: Never Used   • Alcohol use 0.0 oz/week      Comment: 1-2 per week     Social History     Social History Narrative    Retired from construction (pipe line work)     Family History " "  Problem Relation Age of Onset   • Diabetes Mother    • Diabetes Father    • Diabetes Sister    • Diabetes Brother    • No Known Problems Brother    • Heart Disease Neg Hx    • Heart Failure Neg Hx      Family Status   Relation Status   • Mother  at age 86   • Father  at age 85   • Sister  at age 51   • Brother Alive   • Brother Alive   • Neg Hx      Allergies: Amoxicillin and Hydrocodone    ROS  Constitutional: Negative for fatigue/generalized weakness.   HEENT: Negative for  vision changes, hearing changes    Respiratory: Negative for shortness of breath  Cardiovascular: Negative for chest pain, palpitations  Gastrointestinal: Negative for constipation, diarrhea. Positive for occasional bright red blood with wiping (history of hemorrhoids)  Genitourinary: Negative for dysuria, polyuria  Musculoskeletal: Negative for myalgias, back pain. Positive for bilateral knee pain   Skin: Negative for rash  Neurological: Negative for numbness, tingling of right anterolateral thigh   Psychiatric/Behavioral: Negative for depression, anxiety      Objective   Blood pressure 140/82, pulse 91, temperature 37.4 °C (99.4 °F), resp. rate 16, height 1.702 m (5' 7\"), weight 96.6 kg (213 lb), SpO2 95 %. Body mass index is 33.36 kg/m².    General: Alert, oriented. In no acute distress   HEET: EOMI, PERRL, conjunctiva non-injected, sclera non-icteric.  Nares patent with no significant congestion or drainage.  Pam pinnae, external auditory canals, cerumen impaction of left. Some cerumen of right ear canal but not impacted. Oral mucous membranes pink and moist with no lesions.  Neck: supple with no cervical, subclavicular lymphadenopathy, JVD, palpable thyroid nodules   Lungs: clear to auscultation bilaterally with good excursion.  CV: regular rate and rhythm.  Abdomen soft, non-distended, non-tender with normal bowel sounds. No hepatosplenomegaly, no masses palpated  Skin: no lesions. Warm, dry   Psychiatric: " "appropriate mood and affect     Assessment and Plan   The following treatment plan was discussed     1. Meralgia paraesthetica, right  Counseled on weight loss and stricter diabetic control. Will continue gabapentin for now since it's helping.   - gabapentin (NEURONTIN) 100 MG Cap; 1 capsule at night x 3 days, increase to 200 x 30 days then 300 mg nightly.  Dispense: 60 Cap; Refill: 0    2. Mixed hyperlipidemia  Lipids checked this year and at [facility], continue lipitor 40 mg daily.   - atorvastatin (LIPITOR) 40 MG Tab; TAKE ONE TABLET BY MOUTH ONCE DAILY  Dispense: 90 Tab; Refill: 3    Lab Results   Component Value Date/Time    CHOLSTRLTOT 152 04/07/2017 10:01 AM    LDL 78 04/07/2017 10:01 AM    HDL 44 04/07/2017 10:01 AM    TRIGLYCERIDE 149 04/07/2017 10:01 AM       3. Type 2 diabetes mellitus with hyperglycemia, with long-term current use of insulin (CMS-HCC)  Patient seems to not have a clear understanding yet of correction factor and how often to take his regular insulin, how much to take. Will benefit from diabetic education again and put in referral for him to return to his endocrinologist and ophthalmologist. Will check HgbA1c now although doubt it's at control. Emphasized the need for compliance with diet and medication. Asked him per his endocrinologist's instructions 6/2017 to be on regular insulin 16u tid and 32 units NPH twice daily.    - Insulin Syringe-Needle U-100 (INSULIN SYRINGE .3CC/31GX5/16\") 31G X 5/16\" 0.3 ML Misc; 1 Syringe by Does not apply route 5 Times a Day.  Dispense: 200 Each; Refill: 5    4. Obesity (BMI 30.0-34.9)  Counseled on dietary and exercise lifestyle modifications.   - REFERRAL TO OPHTHALMOLOGY  - HEMOGLOBIN A1C; Future  - REFERRAL TO DIABETIC EDUCATION Diabetes Self Management Education / Training (DSME/T) and Medical Nutrition Therapy (MNT): Initial Group DSME/MNT as authorized by payor, Follow-Up DSME/MNT as authorized by payor; DSME/T Content: Monitoring Diabete...    5. " Essential hypertension  Currently not at control however he's been out of his medications. Urged him to call us for refills prior to running out. Continue on below medications and counseled on weight loss.   - lisinopril (PRINIVIL, ZESTRIL) 40 MG tablet; TAKE ONE TABLET BY MOUTH ONCE DAILY  Dispense: 90 Tab; Refill: 3  - hydrochlorothiazide (HYDRODIURIL) 25 MG Tab; TAKE ONE TABLET BY MOUTH ONCE DAILY  Dispense: 90 Tab; Refill: 3  - amlodipine (NORVASC) 5 MG Tab; TAKE ONE TABLET BY MOUTH ONCE DAILY  Dispense: 90 Tab; Refill: 3    6. Anxiety  Would like to wean his dose of Paxil, which is appropriate. Will wean from 30 mg to 20 mg daily.   - paroxetine (PAXIL) 20 MG Tab; Take 1 Tab by mouth every day.  Dispense: 90 Tab; Refill: 0    7. Need for vaccination with 13-polyvalent pneumococcal conjugate vaccine  Given today, has received Pneumovax in 2015.   - PNEUMOCOCCAL CONJUGATE VACCINE 13-VALENT    8. Need for influenza vaccination  Given high dose flu vaccine today.   - INFLUENZA VACCINE, HIGH DOSE (65+ ONLY)    9. Chronic pain of both knees  Urged weight loss but may also benefit from any interventions (including physical therapy) under the care of a physiatrist.   - REFERRAL TO PHYSIATRY (PMR)    10. Restless legs syndrome (RLS)  Symptoms well controlled on Pramipexole.   - pramipexole (MIRAPEX) 1 MG Tab; Take 1 Tab by mouth 3 times a day.  Dispense: 90 Tab; Refill: 2    11. Ear fullness, left  Procedure: Pt is prepped and lavaged by MA and residual wax curetted by MD with resolution of impaction.   After the procedure, TM pearly gray with normal light reflex bilaterally.    Return in about 2 months (around 2/18/2018).    Health Maintenance      Health Maintenance Due   Topic Date Due   • RETINAL SCREENING  09/15/2016   • A1C SCREENING  12/08/2017       Catarino Molina MD  Internal Medicine  [facility]

## 2017-12-18 NOTE — ASSESSMENT & PLAN NOTE
Has had bilateral knee replacements and is now having chronic pain since limiting his physical activity.

## 2017-12-18 NOTE — ASSESSMENT & PLAN NOTE
Was seen in urgent care about 2 weeks ago and had meralgia paresthetica explained to him. Was prescribed gabapentin in urgent care which was helping.

## 2017-12-18 NOTE — PATIENT INSTRUCTIONS
· You need to be taking insulin NPH 32 units twice a day, regular insulin 16 units three times a day  · You have been given a referral to a physiatrist for your knee pain  · The dose of your Paxil (anxiety medication) has been reduced from 30 mg to 20 mg  · You have been given a referral to see your endocrinologist again  · You have been given a referral to see your eye doctor (dr. Aguirre) again  · You have been given a referral to return to diabetes education   · You may try a cotton ball soaked in mineral oil or over the counter debrox drops for the ear wax in your ears

## 2017-12-18 NOTE — ASSESSMENT & PLAN NOTE
He wasn't compliant with diet until recently (was eating carbohydrates and tortillas). Is now more compliant with diet again.     Fasting sugars are now 170-180. Has been taking 16u regular insulin twice a day and 28 units of NPH twice a day. Isn't aware that his endocrinologist wanted him taking 16u regular insulin tid and 32 units of NPH when he saw him 6/2017. Didn't go to his 3 month follow up with his endocrinologist 9/2017 because he had business in [place] to do. Plans to reschedule and get back on track. Missed visit with his ophthalmologist this year because he thought there was a change in his coverage.

## 2017-12-28 DIAGNOSIS — Z79.4 TYPE 2 DIABETES MELLITUS WITH HYPERGLYCEMIA, WITH LONG-TERM CURRENT USE OF INSULIN (HCC): ICD-10-CM

## 2017-12-28 DIAGNOSIS — E11.65 TYPE 2 DIABETES MELLITUS WITH HYPERGLYCEMIA, WITH LONG-TERM CURRENT USE OF INSULIN (HCC): ICD-10-CM

## 2017-12-28 RX ORDER — HUMAN INSULIN 100 [IU]/ML
INJECTION, SOLUTION SUBCUTANEOUS
Qty: 20 ML | Refills: 5 | Status: SHIPPED | OUTPATIENT
Start: 2017-12-28 | End: 2018-02-12

## 2018-01-03 ENCOUNTER — OFFICE VISIT (OUTPATIENT)
Dept: PHYSICAL MEDICINE AND REHAB | Facility: MEDICAL CENTER | Age: 68
End: 2018-01-03
Payer: MEDICARE

## 2018-01-03 VITALS
TEMPERATURE: 97.9 F | WEIGHT: 218 LBS | HEART RATE: 96 BPM | DIASTOLIC BLOOD PRESSURE: 78 MMHG | OXYGEN SATURATION: 95 % | BODY MASS INDEX: 34.21 KG/M2 | SYSTOLIC BLOOD PRESSURE: 120 MMHG | HEIGHT: 67 IN

## 2018-01-03 DIAGNOSIS — Z98.890 H/O KNEE SURGERY: ICD-10-CM

## 2018-01-03 DIAGNOSIS — M25.561 PAIN IN BOTH KNEES, UNSPECIFIED CHRONICITY: ICD-10-CM

## 2018-01-03 DIAGNOSIS — M25.551 RIGHT HIP PAIN: ICD-10-CM

## 2018-01-03 DIAGNOSIS — E11.42 DIABETIC PERIPHERAL NEUROPATHY (HCC): ICD-10-CM

## 2018-01-03 DIAGNOSIS — M54.50 LUMBOSACRAL PAIN: ICD-10-CM

## 2018-01-03 DIAGNOSIS — G57.10 MERALGIA PARESTHETICA, UNSPECIFIED LATERALITY: ICD-10-CM

## 2018-01-03 DIAGNOSIS — M79.2 NERVE PAIN: ICD-10-CM

## 2018-01-03 DIAGNOSIS — M25.562 PAIN IN BOTH KNEES, UNSPECIFIED CHRONICITY: ICD-10-CM

## 2018-01-03 PROCEDURE — 99204 OFFICE O/P NEW MOD 45 MIN: CPT | Performed by: PHYSICAL MEDICINE & REHABILITATION

## 2018-01-03 RX ORDER — LIDOCAINE 50 MG/G
1 PATCH TOPICAL EVERY 24 HOURS
Qty: 30 PATCH | Refills: 2 | Status: SHIPPED | OUTPATIENT
Start: 2018-01-03 | End: 2019-03-19 | Stop reason: SDUPTHER

## 2018-01-03 ASSESSMENT — ENCOUNTER SYMPTOMS
CHILLS: 0
TINGLING: 1
SENSORY CHANGE: 1
EYE PAIN: 0
VOMITING: 0
SPUTUM PRODUCTION: 0
PALPITATIONS: 0
HEMOPTYSIS: 0
BACK PAIN: 1
FEVER: 0
NAUSEA: 0
MYALGIAS: 1
EYE DISCHARGE: 0
ORTHOPNEA: 0

## 2018-01-03 NOTE — PROGRESS NOTES
Subjective:      Carlos Pulido is a 67 y.o. male who presents with New Patient    Chief complaint: Knee pain        HPI   The patient notes long history of bilateral knee pain, underwent bilateral knee replacements outside the area, [place], notes flare of knee pain, right greater than left, beginning approximate 6 weeks ago without specific event or trauma.    The patient notes ongoing right knee pain, primarily on the anterior and medial aspect, worse with activities. The patient underwent right knee replacement in 11/2011.    The patient notes left knee pain, primarily on the anterior medial aspect, worse with activities. Patient underwent left knee replacement in 11/2009.    The patient notes intermittent thigh paresthesias, right greater than left, diagnosed with meralgia paresthetica.    The patient notes intermittent hip pain on the right.    The patient notes intermittent lumbosacral pain, controlled, without radicular component.    The patient notes lower limb nerve pain, noted history of diabetes/neuropathy.    The patient has had prior treatment with medications. He has remotely been to physical therapy. No acute changes with bowel/bladder noted. No acute changes with strength noted. The ongoing pain limits his ability to function. He is inquiring about additional treatment options.      MEDICAL RECORDS REVIEW/DATA REVIEW: Reviewed in epic.    Records Reviewed: Reviewed referring provider notes.     I reviewed medications. Using gabapentin 100 mg at night, with benefit, notes sedation, not tolerated during the day. Previously used Lidoderm patch, with benefit.    I reviewed diagnostic studies:     I reviewed radiographs.     Reviewed bilateral knee x-rays 5/2017, images and report, showed bilateral knee prosthetics in place without complication, lateral view on left revealed patellar replacement component, no right knee lateral radiographs available for review.    Reviewed MRI pelvis 7/2017.    I  "reviewed lab studies. Reviewed labs 4/2017, including CMP, A1C 9.6. Reviewed BNP 6/2017.    I reviewed medical issues. Urology consulted regarding history of hematuria.    I reviewed family history: No neuromuscular disorders noted.    I reviewed social issues. Retired       PAST MEDICAL HISTORY:   Past Medical History:   Diagnosis Date   • Anxiety 4/16/2013    As needed for anxiety   • Hypertension 4/16/2013   • Low testosterone 4/16/2013   • Meralgia paraesthetica, right 12/18/2017   • PATTI (obstructive sleep apnea) 4/16/2013    Cannot tolerate sleep apnea   • Restless legs syndrome (RLS) 4/16/2013       PAST SURGICAL HISTORY:    Past Surgical History:   Procedure Laterality Date   • KNEE REPLACEMENT, TOTAL Right 2011    done in [facility]   • KNEE REPLACEMENT, TOTAL Left 2009    done in [facility]   • RHINOPLASTY         ALLERGIES:  Amoxicillin and Hydrocodone    MEDICATIONS:    Outpatient Encounter Prescriptions as of 1/3/2018   Medication Sig Dispense Refill   • lidocaine (LIDODERM) 5 % Patch Apply 1 Patch to skin as directed every 24 hours. as needed for nerve pain. 30 Patch 2   • NOVOLIN R RELION 100 UNIT/ML Solution INJECT 16 UNITS SUBCUTANEOUSLY THREE TIMES DAILY BEFORE MEALS 20 mL 5   • gabapentin (NEURONTIN) 100 MG Cap 1 capsule at night x 3 days, increase to 200 x 30 days then 300 mg nightly. 60 Cap 0   • paroxetine (PAXIL) 20 MG Tab Take 1 Tab by mouth every day. 90 Tab 0   • lisinopril (PRINIVIL, ZESTRIL) 40 MG tablet TAKE ONE TABLET BY MOUTH ONCE DAILY 90 Tab 3   • hydrochlorothiazide (HYDRODIURIL) 25 MG Tab TAKE ONE TABLET BY MOUTH ONCE DAILY 90 Tab 3   • atorvastatin (LIPITOR) 40 MG Tab TAKE ONE TABLET BY MOUTH ONCE DAILY 90 Tab 3   • amlodipine (NORVASC) 5 MG Tab TAKE ONE TABLET BY MOUTH ONCE DAILY 90 Tab 3   • Insulin Syringe-Needle U-100 (INSULIN SYRINGE .3CC/31GX5/16\") 31G X 5/16\" 0.3 ML Misc 1 Syringe by Does not apply route 5 Times a Day. 200 Each 5   • RELION " "INSULIN SYRINGE 31G X 15/64\" 0.3 ML Misc      • NOVOLIN N RELION 100 UNIT/ML Suspension Take 32 units twice daily 10 mL    • Lancets Misc Lancets order:   Accucheck Soft Clix lancets  Testing blood sugars 5 times per day for insulin adjustment. 1 Each 12   • Blood Glucose Monitoring Suppl SUPPLIES Misc Test strips order: Test strips for Accucheck Marie meter.   Patient to check blood sugars 4 times per day 150 Each 6   • aspirin 81 MG tablet Take 81 mg by mouth every day.     • metformin ER 1000 MG TABLET SR 24 HR Take 1,000 mg by mouth 2 times a day. 180 Tab 2   • pramipexole (MIRAPEX) 1 MG Tab Take 1 Tab by mouth 3 times a day. 90 Tab 2     No facility-administered encounter medications on file as of 1/3/2018.        SOCIAL HISTORY:    Social History     Social History   • Marital status:      Spouse name: N/A   • Number of children: N/A   • Years of education: N/A     Social History Main Topics   • Smoking status: Never Smoker   • Smokeless tobacco: Never Used   • Alcohol use 0.0 oz/week      Comment: 1-2 per week   • Drug use:      Types: Marijuana      Comment: occasional    • Sexual activity: Yes     Partners: Female      Comment:      Other Topics Concern   •  Service No   • Blood Transfusions No   • Caffeine Concern No   • Occupational Exposure Yes     asbestos    • Hobby Hazards No   • Sleep Concern Yes   • Stress Concern No   • Weight Concern Yes   • Special Diet Yes     less carbs   • Back Care Yes   • Exercise Yes   • Bike Helmet No     does not ride bike    • Seat Belt Yes   • Self-Exams Yes     Social History Narrative    Retired from construction (pipe line work)         Review of Systems   Constitutional: Negative for chills and fever.   HENT: Negative for ear pain and tinnitus.    Eyes: Negative for pain and discharge.   Respiratory: Negative for hemoptysis and sputum production.    Cardiovascular: Negative for palpitations and orthopnea.   Gastrointestinal: Negative for nausea " "and vomiting.   Genitourinary: Positive for hematuria.   Musculoskeletal: Positive for back pain, joint pain and myalgias.   Skin: Negative.    Neurological: Positive for tingling and sensory change.   All other systems reviewed and are negative.        Objective:     /78   Pulse 96   Temp 36.6 °C (97.9 °F)   Ht 1.702 m (5' 7\")   Wt 98.9 kg (218 lb)   SpO2 95%   BMI 34.14 kg/m²      Physical Exam    Constitutional: oriented to person, place, and time, appears well-developed and well-nourished.   HEENT: Normocephalic atraumatic, neck supple, no JVD noted, no masses noted, no meningeal signs noted  Lymphadenopathy: no cervical, supraclavicular, or inguinal lymphadenopathy noted  Cardiovascular: Intact distal pulses, including at ankles, no limb swelling noted  Pulmonary: No tachypnea noted, no accessory muscle use noted, no dyspnea noted  Abdominal: Soft, nontender, exhibits no distension, no peritoneal signs, no HSM  Musculoskeletal:   Right hip: exhibits only mild tenderness. Minimal pain with range of motion testing, mild tenderness right ASIS region  Left hip: exhibits only mild tenderness. Minimal pain with range of motion testing  Lumbar back: exhibits only mild decreased range of motion, only mild tenderness and only mild pain. negative straight leg testing, trigger points noted  Knee: Healed bilateral knee scars consistent with prior surgeries, tender with palpation primarily medial aspect bilateral knees, also anterior aspect of knees, no signs of infection, no effusion noted, stable with stress testing  Neurological: oriented to person, place, and time. Cranial nerves grossly intact, normal strength. Sensation intact distally. Reflexes 1-2+ in upper and lower limbs, Gait normal. Able to heel/toe walk, No upper motor neuron signs evident  Skin: Skin is intact. no rashes or lesions noted  Psychiatric: normal mood and affect. speech is normal and behavior is normal. Judgment and thought content " normal. Cognition and memory are normal.        Assessment/Plan:       ASSESSMENT:    1. Bilateral knee pain, sprain strain, history of bilateral total knee arthroplasties    - DX-KNEE 2- RIGHT; Future   - REFERRAL TO PHYSICAL THERAPY Reason for Therapy: Eval/Treat/Report    2. Right hip pain, sprain strain    - DX-HIP-COMPLETE - UNILATERAL 2+ RIGHT; Future    3. Bilateral thigh pain, right greater than left, meralgia paresthetica symptoms    - DX-HIP-COMPLETE - UNILATERAL 2+ RIGHT; Future  - Reviewed ergonomic modifications    4. Lumbosacral pain, symptomatically controlled    5. Nerve pain, diabetic peripheral neuropathy    6. Comorbid medical issues, including diabetes, history of hematuria, obesity, with care per primary care provider, consultants    - Reviewed dietary/nutrition issues      DISCUSSION/PLAN:    - I discussed management options. I reviewed symptomatic care    - I reviewed home exercise program, with medical precautions    - The patient can consider complementary trials with acupuncture or TENS unit     - I reviewed medication monitoring. I reviewed medication adjustments. I wrote prescription for:    - lidocaine (LIDODERM) 5 % Patch; Apply 1 Patch to skin as directed every 24 hours. as needed for nerve pain.  Dispense: 30 Patch; Refill: 2    - I reviewed risks, side effects, and interactions of medications, including over-the-counter medications.  I reviewed further symptomatic medications.    - I reviewed additional diagnostic options, including further/advanced imaging, electrodiagnostic testing, vascular studies, and further lab screen    - I reviewed additional therapeutic options, including injection/interventional therapy and additional consultative input     - I reviewed psychosocial interventions    - Coordinate follow-up after reviewing results from above-noted radiographs, after physical therapy treatment trial,  or an as-needed basis      Please note that this dictation was created  using voice recognition software. I have made every reasonable attempt to correct obvious errors but there may be errors of grammar and content that I may have overlooked prior to finalization of this note.

## 2018-01-25 ENCOUNTER — TELEPHONE (OUTPATIENT)
Dept: PHYSICAL MEDICINE AND REHAB | Facility: MEDICAL CENTER | Age: 68
End: 2018-01-25

## 2018-01-25 NOTE — TELEPHONE ENCOUNTER
Gustavo gave me number and message that Carlos's wife had a question about medication. I called back but no voice mail is set up for their number.

## 2018-01-29 ENCOUNTER — HOSPITAL ENCOUNTER (OUTPATIENT)
Dept: LAB | Facility: MEDICAL CENTER | Age: 68
End: 2018-01-29
Attending: INTERNAL MEDICINE
Payer: MEDICARE

## 2018-01-29 ENCOUNTER — HOSPITAL ENCOUNTER (OUTPATIENT)
Dept: RADIOLOGY | Facility: MEDICAL CENTER | Age: 68
End: 2018-01-29
Attending: FAMILY MEDICINE
Payer: MEDICARE

## 2018-01-29 ENCOUNTER — HOSPITAL ENCOUNTER (OUTPATIENT)
Dept: RADIOLOGY | Facility: MEDICAL CENTER | Age: 68
End: 2018-01-29
Attending: PHYSICAL MEDICINE & REHABILITATION
Payer: MEDICARE

## 2018-01-29 ENCOUNTER — OFFICE VISIT (OUTPATIENT)
Dept: URGENT CARE | Facility: PHYSICIAN GROUP | Age: 68
End: 2018-01-29
Payer: MEDICARE

## 2018-01-29 VITALS
DIASTOLIC BLOOD PRESSURE: 68 MMHG | WEIGHT: 212 LBS | HEIGHT: 67 IN | HEART RATE: 88 BPM | OXYGEN SATURATION: 96 % | TEMPERATURE: 97.9 F | SYSTOLIC BLOOD PRESSURE: 116 MMHG | BODY MASS INDEX: 33.27 KG/M2 | RESPIRATION RATE: 16 BRPM

## 2018-01-29 DIAGNOSIS — M25.562 PAIN IN BOTH KNEES, UNSPECIFIED CHRONICITY: ICD-10-CM

## 2018-01-29 DIAGNOSIS — M19.019 SHOULDER ARTHRITIS: ICD-10-CM

## 2018-01-29 DIAGNOSIS — M25.561 PAIN IN BOTH KNEES, UNSPECIFIED CHRONICITY: ICD-10-CM

## 2018-01-29 DIAGNOSIS — Z98.890 H/O KNEE SURGERY: ICD-10-CM

## 2018-01-29 DIAGNOSIS — M25.551 RIGHT HIP PAIN: ICD-10-CM

## 2018-01-29 DIAGNOSIS — E66.9 OBESITY (BMI 30.0-34.9): ICD-10-CM

## 2018-01-29 LAB
EST. AVERAGE GLUCOSE BLD GHB EST-MCNC: 214 MG/DL
HBA1C MFR BLD: 9.1 % (ref 0–5.6)

## 2018-01-29 PROCEDURE — 36415 COLL VENOUS BLD VENIPUNCTURE: CPT | Mod: GA

## 2018-01-29 PROCEDURE — 73502 X-RAY EXAM HIP UNI 2-3 VIEWS: CPT | Mod: RT

## 2018-01-29 PROCEDURE — 83036 HEMOGLOBIN GLYCOSYLATED A1C: CPT | Mod: GA

## 2018-01-29 PROCEDURE — 73560 X-RAY EXAM OF KNEE 1 OR 2: CPT | Mod: RT

## 2018-01-29 PROCEDURE — 73030 X-RAY EXAM OF SHOULDER: CPT | Mod: LT

## 2018-01-29 PROCEDURE — 99214 OFFICE O/P EST MOD 30 MIN: CPT | Performed by: FAMILY MEDICINE

## 2018-02-02 NOTE — PROGRESS NOTES
"Subjective:      Chief Complaint   Patient presents with   • Shoulder Injury     Lt side              Shoulder Injury   c/o dull, intermittent left shoulder pain x 1 wk.     The injury mechanism was:  Unknown.  Denies overuse. The quality of the pain is described as aching. The pain does not radiate. The pain is moderate.. Pertinent negatives include no chest pain, muscle weakness, numbness or tingling. Lifting the arm aggravates the symptoms. pt has tried motrin for the symptoms - no improvement.        Social History   Substance Use Topics   • Smoking status: Never Smoker   • Smokeless tobacco: Never Used   • Alcohol use 0.0 oz/week      Comment: 1-2 per week         Current Outpatient Prescriptions on File Prior to Visit   Medication Sig Dispense Refill   • lidocaine (LIDODERM) 5 % Patch Apply 1 Patch to skin as directed every 24 hours. as needed for nerve pain. 30 Patch 2   • NOVOLIN R RELION 100 UNIT/ML Solution INJECT 16 UNITS SUBCUTANEOUSLY THREE TIMES DAILY BEFORE MEALS 20 mL 5   • gabapentin (NEURONTIN) 100 MG Cap 1 capsule at night x 3 days, increase to 200 x 30 days then 300 mg nightly. 60 Cap 0   • paroxetine (PAXIL) 20 MG Tab Take 1 Tab by mouth every day. 90 Tab 0   • lisinopril (PRINIVIL, ZESTRIL) 40 MG tablet TAKE ONE TABLET BY MOUTH ONCE DAILY 90 Tab 3   • hydrochlorothiazide (HYDRODIURIL) 25 MG Tab TAKE ONE TABLET BY MOUTH ONCE DAILY 90 Tab 3   • atorvastatin (LIPITOR) 40 MG Tab TAKE ONE TABLET BY MOUTH ONCE DAILY 90 Tab 3   • amlodipine (NORVASC) 5 MG Tab TAKE ONE TABLET BY MOUTH ONCE DAILY 90 Tab 3   • Insulin Syringe-Needle U-100 (INSULIN SYRINGE .3CC/31GX5/16\") 31G X 5/16\" 0.3 ML Misc 1 Syringe by Does not apply route 5 Times a Day. 200 Each 5   • pramipexole (MIRAPEX) 1 MG Tab Take 1 Tab by mouth 3 times a day. 90 Tab 2   • RELION INSULIN SYRINGE 31G X 15/64\" 0.3 ML Misc      • NOVOLIN N RELION 100 UNIT/ML Suspension Take 32 units twice daily 10 mL    • Lancets Misc Lancets order:   Accucheck " "Soft Clix lancets  Testing blood sugars 5 times per day for insulin adjustment. 1 Each 12   • Blood Glucose Monitoring Suppl SUPPLIES Misc Test strips order: Test strips for Accucheck Marie meter.   Patient to check blood sugars 4 times per day 150 Each 6   • aspirin 81 MG tablet Take 81 mg by mouth every day.     • metformin ER 1000 MG TABLET SR 24 HR Take 1,000 mg by mouth 2 times a day. 180 Tab 2     No current facility-administered medications on file prior to visit.          Past Medical History:   Diagnosis Date   • Anxiety 4/16/2013    As needed for anxiety   • Hypertension 4/16/2013   • Low testosterone 4/16/2013   • Meralgia paraesthetica, right 12/18/2017   • PATTI (obstructive sleep apnea) 4/16/2013    Cannot tolerate sleep apnea   • Restless legs syndrome (RLS) 4/16/2013               Review of Systems   Respiratory: Negative for shortness of breath.    Cardiovascular: Negative for chest pain.   Neurological: Negative for tingling, numbness and headaches.   All other systems reviewed and are negative.         Objective:     Blood pressure 116/68, pulse 88, temperature 36.6 °C (97.9 °F), resp. rate 16, height 1.702 m (5' 7\"), weight 96.2 kg (212 lb), SpO2 96 %.      Physical Exam   Constitutional: He is oriented to person, place, and time. Pt appears well-developed. No distress.   HENT:   Head: Normocephalic and atraumatic.   Eyes: Conjunctivae are normal.   Cardiovascular: Normal rate.    Pulmonary/Chest: Effort normal.   Musculoskeletal:        Left shoulder: pt exhibits decreased range of motion, tenderness (posterior) and pain. There is no effusion and no crepitus.  no muscle spasm.  pain is NOT reproduced with resisted external rotation.  Deltoid ,  strength is 5/5.  No cervical spine tenderness.   Neurological: pt is alert and oriented to person, place, and time.  extremities - neurovascularly intact.  Skin: Skin is warm. Pt is not diaphoretic. No erythema.   Psychiatric: pt behavior is normal. "   Nursing note and vitals reviewed.           1/29/2018 10:14 AM    HISTORY/REASON FOR EXAM:  Left shoulder pain for more than a week      TECHNIQUE/EXAM DESCRIPTION AND NUMBER OF VIEWS:  3 views of the LEFT shoulder.    COMPARISON: None    FINDINGS:  There is no evidence of displaced  fracture or dislocation.    There is moderate degenerative spurring throughout the left AC joint. There is inferior acromial spurring. There is some subchondral cystic change with marginal spurring along the superior-lateral humeral head at the rotator cuff insertion. Space between   the inferior acromium and humeral head is slightly decreased suggesting possible chronic rotator cuff pathology. The glenohumeral joint is unremarkable.    The visualized lung parenchyma is clear.    There are no displaced rib fractures in this field-of-view.   Impression       1.  There is moderate degenerative change in the left AC joint.  2.  There is inferior acromial spurring slightly narrowing the space for the rotator cuff.   Reading Provider Reading Date   Hannah Roger M.D. Jan 29, 2018   Signing Provider Signing Date Signing Time   Hannah Roger M.D. Jan 29, 2018 10:45 AM            Assessment/Plan:         1. Shoulder arthritis    X-rays were personally reviewed by myself.   There is no fracture, just arthritic changes.       - REFERRAL TO PHYSICAL THERAPY Reason for Therapy: Eval/Treat/Report  - Diclofenac Sodium (VOLTAREN) 1 % Gel; Apply 4 g to skin as directed 3 times a day as needed.  Dispense: 1 Tube; Refill: 0

## 2018-02-05 DIAGNOSIS — G57.11 MERALGIA PARAESTHETICA, RIGHT: ICD-10-CM

## 2018-02-06 RX ORDER — GABAPENTIN 100 MG/1
CAPSULE ORAL
Qty: 270 CAP | Refills: 1 | Status: SHIPPED | OUTPATIENT
Start: 2018-02-06 | End: 2018-08-17

## 2018-02-06 NOTE — TELEPHONE ENCOUNTER
Was the patient seen in the last year in this department? Yes     Does patient have an active prescription for medications requested? No     Received Request Via: Pharmacy      Pt met protocol?: Yes    OV- 12/17

## 2018-02-07 RX ORDER — BLOOD SUGAR DIAGNOSTIC
STRIP MISCELLANEOUS
Qty: 150 STRIP | Refills: 6 | Status: SHIPPED | OUTPATIENT
Start: 2018-02-07 | End: 2018-07-11 | Stop reason: SDUPTHER

## 2018-02-08 ENCOUNTER — APPOINTMENT (OUTPATIENT)
Dept: PHYSICAL THERAPY | Facility: REHABILITATION | Age: 68
End: 2018-02-08
Attending: FAMILY MEDICINE
Payer: MEDICARE

## 2018-02-12 ENCOUNTER — OFFICE VISIT (OUTPATIENT)
Dept: ENDOCRINOLOGY | Facility: MEDICAL CENTER | Age: 68
End: 2018-02-12
Payer: MEDICARE

## 2018-02-12 VITALS
BODY MASS INDEX: 33.43 KG/M2 | HEART RATE: 88 BPM | WEIGHT: 213 LBS | OXYGEN SATURATION: 92 % | DIASTOLIC BLOOD PRESSURE: 90 MMHG | SYSTOLIC BLOOD PRESSURE: 139 MMHG | HEIGHT: 67 IN

## 2018-02-12 DIAGNOSIS — Z79.4 TYPE 2 DIABETES MELLITUS WITH HYPERGLYCEMIA, WITH LONG-TERM CURRENT USE OF INSULIN (HCC): ICD-10-CM

## 2018-02-12 DIAGNOSIS — E78.2 MIXED HYPERLIPIDEMIA: ICD-10-CM

## 2018-02-12 DIAGNOSIS — E11.65 TYPE 2 DIABETES MELLITUS WITH HYPERGLYCEMIA, WITH LONG-TERM CURRENT USE OF INSULIN (HCC): ICD-10-CM

## 2018-02-12 DIAGNOSIS — I10 ESSENTIAL HYPERTENSION: ICD-10-CM

## 2018-02-12 PROCEDURE — 99214 OFFICE O/P EST MOD 30 MIN: CPT | Performed by: INTERNAL MEDICINE

## 2018-02-12 NOTE — PROGRESS NOTES
"Endocrinology Clinic Progress Note    CC: Type 2 diabetes    HPI:  Carlos Pulido is a 67 y.o. old patient who comes in today for routine follow up.     Type 2 diabetes: He is currently on NPH 32 units twice a day and regular insulin 16 units with breakfast and 16 units with dinner. Also on metformin 1000 mg twice a day. He checks blood sugars 4-6 times a day. Fasting blood sugar in the last few days has been in 200s. Pre-meal blood sugar readings are in 200s as well. Hemoglobin A1c on January 29, 2018 was 9.1%. He has some burning sensation in feet. He is up-to-date with eye exam.    Hypertension: Blood pressure is slightly higher than goal in the clinic today. He is on ACE inhibitor.    Hyperlipidemia: He is currently on Lipitor, tolerated well.    ROS:  Constitutional: No unintentional weight loss  Endo: Denies excessive thirst or frequent urination    PMH:  Patient Active Problem List   Diagnosis   • Hypertension   • Restless legs syndrome (RLS)   • PATTI (obstructive sleep apnea)   • Low testosterone   • Anxiety   • External hemorrhoid, bleeding   • Chronic insomnia   • Obesity (BMI 30.0-34.9)   • Uncontrolled type 2 diabetes mellitus with insulin therapy (CMS-[facility])   • Non compliance w medication regimen   • Meralgia paraesthetica, right   • Chronic pain of both knees   • Ear fullness, left       EXAM:  Vital signs: /90   Pulse 88   Ht 1.702 m (5' 7\")   Wt 96.6 kg (213 lb)   SpO2 92%   BMI 33.36 kg/m²   General: No apparent distress, cooperative  Eyes: No scleral icterus, no discharge  Neck: Normal on external inspection  Resp: Normal effort, clear to auscultation bilaterally  CVS: Regular rate and rhythm, S1 S2 normal  Musculoskeletal: Normal gait  Extremities: No lower extremity edema  Skin: No rash on visible skin  Psych: Alert and oriented, normal mood and affect    Assessment and Plan:    1. Type 2 diabetes mellitus with hyperglycemia, with long-term current use of insulin " (CMS-[facility])  · Hemoglobin A1c last month was 9.1%  · Goal A1c less than 7%  · Switched from NPH to Tresiba 70 units at bedtime, and we discussed treat to target recommendations   · Switched from regular insulin to NovoLog, starting with 16 units at breakfast, 10 units with lunch and 16 units with dinner, plus mid dose correction factor  · Advised to check blood sugars 4-6 times a day and to keep a log  - Insulin Degludec (TRESIBA FLEXTOUCH) 100 UNIT/ML Solution Pen-injector; Inject 70 Units as instructed every bedtime.  Dispense: 30 mL; Refill: 6  - NOVOLOG, insulin aspart, (NOVOLOG FLEXPEN) 100 UNIT/ML Solution Pen-injector injection; Inject 12-20 Units as instructed 3 times a day before meals.  Dispense: 30 mL; Refill: 6  - Insulin Pen Needle (BD PEN NEEDLE DAVIE U/F) 32G X 4 MM Misc; For insulin shots 4 times a day  Dispense: 150 Each; Refill: 6    2. Mixed hyperlipidemia  · Continue Lipitor    3. Essential hypertension  · Continue ACEi    Return in about 4 weeks (around 3/12/2018).    Thank you for allowing me to participate in the care of this patient.    Maria D Rhodes M.D.    CC:   Catarino Molina M.D.    This note was created using voice recognition software (Dragon). The accuracy of the dictation is limited by the abilities of the software. I have reviewed the note prior to signing, however some errors in grammar and context are still possible. If you have any questions related to this note please do not hesitate to contact our office.

## 2018-02-12 NOTE — PATIENT INSTRUCTIONS
INSULIN DOSING    Long acting insulin: Tresiba 70 units at bedtime   Short acting insulin: Novolog  16 units with b'fast  10 units with lunch  16 units with dinner     Adjusting long acting insulin dose: Check fasting blood sugar every morning. If fasting blood sugar in the morning is greater than 140 for 3 days in a row then increase the dose of your long-acting insulin by 2 unit(s). In case you have any low blood sugar (less than 70) in the middle of the night or in the morning before breakfast, then lower the dose of your long-acting insulin by 4 units. Goal blood sugar in the morning is in the range of .    Adjusting short-acting insulin dose: Check your blood sugar just before eating, if pre- meal blood sugar reading is less than 140 then take the scheduled dose of short-acting insulin as mentioned above. However if your blood sugar before a meal is greater than 140 then add additional short-acting insulin as per scale below:    Blood sugar reading: Dose of additional short acting insulin:   140-180 2 unit(s)   181-220 4 units   221-260 6 units   261-300 8 units   301-340 10 units   341-380 12 units   Over 380 14 units     Goal blood sugar 2 hours after meals: Under 180    Frequency of blood sugar checks: Fasting in the morning, and just before each meal.    Hypoglycemia symptom recognition:   Insulin can cause low blood sugar, symptoms include:   · Shakiness  · Weakness  · Sweating  · Hunger  · Confusion  · Nightmares or nighttime sweats if the low blood sugar happens while you are asleep    Low blood sugar symptoms typically start when your blood sugar is less than 70 mg/dl. If untreated, extreme low blood sugar can lead to:  · Unconsciousness   · Seizures    Rarely, some people with diabetes slip into hypoglycemia without these typical symptoms. This is called hypoglycemia unawareness. Most episodes of low blood sugar can be prevented by good planning. Skipping meals, taking too much insulin or  exercising can cause hypoglycemia.     Hypoglycemia treatment:   Treat low blood sugars as soon as possible. Always have a source of carbohydrate with you. Here are some handy foods to treat hypoglycemia quickly:  · Three to five glucose tablets (usually contain about 3 grams of glucose each)  · 4 ounces of fruit juice or 8 ounces of milk  · Cake frosting in a tube, about a tablespoon    Hypoglycemia can also be treated with an injected medication called glucagon. Glucagon comes in a small, red plastic box. The glucagon is a powder in a sterile vial that needs to be mixed with a liquid before injection. The glucagon kit can be kept in the refrigerator for up to 2 years. It is a good idea to teach someone who lives with you on how to mix and inject glucagon. The glucagon kit comes with very clear directions.     Glucagon causes some brief nausea. Glucagon works for just 10-15 minutes; so it is important to eat or drink some sugar after glucagon is given.     The Rule of 15:  The Rule of 15 is a simple tool that is easy to remember. When you think you are having a hypoglycemic reaction:   · Take your blood sugar (if you can't do this safely, just treat the symptoms!)  · If it is less than 70 mg/dl, eat 15 grams of carbohydrate  · Wait for 15 minutes  · If your blood sugar is still less than 70, take another 15 grams of carbohydrate and re-check your blood sugar in 15 minutes

## 2018-02-13 ENCOUNTER — PHYSICAL THERAPY (OUTPATIENT)
Dept: PHYSICAL THERAPY | Facility: REHABILITATION | Age: 68
End: 2018-02-13
Attending: FAMILY MEDICINE
Payer: MEDICARE

## 2018-02-13 ENCOUNTER — APPOINTMENT (OUTPATIENT)
Dept: PHYSICAL THERAPY | Facility: REHABILITATION | Age: 68
End: 2018-02-13
Payer: MEDICARE

## 2018-02-13 DIAGNOSIS — M19.012 OSTEOARTHRITIS OF LEFT SHOULDER, UNSPECIFIED OSTEOARTHRITIS TYPE: ICD-10-CM

## 2018-02-13 PROCEDURE — G8985 CARRY GOAL STATUS: HCPCS | Mod: CI

## 2018-02-13 PROCEDURE — 97161 PT EVAL LOW COMPLEX 20 MIN: CPT

## 2018-02-13 PROCEDURE — 97110 THERAPEUTIC EXERCISES: CPT

## 2018-02-13 PROCEDURE — G8984 CARRY CURRENT STATUS: HCPCS | Mod: CJ

## 2018-02-13 ASSESSMENT — ENCOUNTER SYMPTOMS
PAIN TIMING: INTERMITTENT
PAIN SCALE AT HIGHEST: 8
ALLEVIATING FACTORS: REST
QUALITY: ACHING
PAIN SCALE: 3
PAIN SCALE AT LOWEST: 0

## 2018-02-13 NOTE — OP THERAPY EVALUATION
Outpatient Physical Therapy  INITIAL EVALUATION    [facility] [place]  [address]  [place] [address]  Phone:  [phone]  Fax:  [phone]    Date of Evaluation: 2018    Patient: Carlos Pulido  YOB: 1950  MRN: [id number]     Referring Provider: Nima Reyes M.D.  [address]  [address]  [place] [address]   Referring Diagnosis Shoulder arthritis [M19.019]     Time Calculation  Start time: 1345  Stop time: 1445 Time Calculation (min): 60 minutes     Physical Therapy Occurrence Codes    Date physical therapy care plan established or reviewed:  18   Date physical therapy treatment started:  18          Chief Complaint: Shoulder Problem    Visit Diagnoses     ICD-10-CM   1. Osteoarthritis of left shoulder, unspecified osteoarthritis type M19.012         Subjective:   History of Present Illness:     Mechanism of injury:  Pt report that he's been having L shoulder pain for a few months. Pain is located along the left side of the arm. Pt denies numbness or tingling. Pain with laying on his lefts side pain and pain with reaching overhead is reported.    Pain:     Current pain rating:  3    At best pain ratin    At worst pain ratin    Quality:  Aching    Pain timing:  Intermittent    Relieving factors:  Rest      Past Medical History:   Diagnosis Date   • Anxiety 2013    As needed for anxiety   • Hypertension 2013   • Low testosterone 2013   • Meralgia paraesthetica, right 2017   • PATTI (obstructive sleep apnea) 2013    Cannot tolerate sleep apnea   • Restless legs syndrome (RLS) 2013     Past Surgical History:   Procedure Laterality Date   • KNEE REPLACEMENT, TOTAL Right     done in [facility]   • KNEE REPLACEMENT, TOTAL Left     done in [facility]   • RHINOPLASTY       Social History   Substance Use Topics   • Smoking status: Never Smoker   • Smokeless tobacco:  Never Used   • Alcohol use 0.0 oz/week      Comment: 1-2 per week     Family and Occupational History     Social History   • Marital status:      Spouse name: N/A   • Number of children: N/A   • Years of education: N/A     Objective     Cervical Screen    Cervical range of motion within normal limits  Thoracic Screen    Thoracic range of motion within normal limits    Neurological Testing     Sensation     Shoulder   Left Shoulder   Intact: light touch    Right Shoulder   Intact: light touch    Reflexes   Left   Wu's reflex: negative    Right   Wu's reflex: negative    Tenderness     Additional Tenderness Details  No palpable tenderness     Active Range of Motion   Left Shoulder   Flexion: 110 degrees   Abduction: 120 degrees   External rotation 90°: 60 degrees     Right Shoulder   Flexion: 160 degrees   Abduction: 160 degrees   External rotation 90°: 80 degrees    Passive Range of Motion   Left Shoulder   Flexion: 130 degrees with pain  Abduction: 120 degrees with pain    Right Shoulder   Flexion: 165 degrees   Abduction: 165 degrees     Joint Play   Left Shoulder     Anterior capsule: hypomobile    Posterior capsule: hypermobile    Inferior capsule: hypomobile    Strength:      Left Shoulder   Planes of Motion   Flexion: 2-   Extension: 4   Abduction: 2-   Adduction: 4   External rotation at 0°: 4+     Tests     Left Shoulder   Positive empty can, full can, Hawkin's and Neer's.   Negative Spurling's sign.       Therapeutic Exercises (CPT 74818):     Total treatment time spent on Therapeutic Exercises: 15 minutes.        Therapeutic Exercise Summary: HEP instruction/performance and development. Handout provided and routed for attachment to electronic chart. Arm slides on table, ER in standing with band, pec stretch for ER in doorway.      Assessment, Response and Plan:   Assessment details:  Carlos Pulido is a 67 y.o. male with signs and symptoms consistent with Shoulder impingement secondary to  OA with capsular tightness reminiscent of adhesive capsulitis. He requires skilled physical therapy intervention to decrease pain, increase range of motion, increase strength increase functional mobility, improve ADL completion and establish a home exercise program.    Goals:   Short Term Goals:   1. Patient will be Independent with prescribed Home Exercise Program (HEP) and will be able to demonstrate exercises without cues for improved overall symptoms/activity tolerance.   2. Pt ROM improved to 130 degrees flexion and 120 abduction for improved ability to reach overhead.   Short term goal time span:  2-4 weeks      Long Term Goals:    3. Decreased SPADI by 15 pts for reduced pain and perceived disability.   4. Pt to reach overhead to grab light objects less than 5 lbs from cupboard.   5. Pt to lay on L side for with less than 4/10 pain.     Long term goal time span:  6-8 weeks    Plan:   Planned therapy interventions:  Therapeutic Exercise (CPT 66903), Therapeutic Activities (CPT 00363), Manual Therapy (CPT 62540), Neuromuscular Re-education (CPT 65802), E Stim Unattended (CPT 54001) and E Stim Attended (CPT 76112)  Frequency:  2x week  Duration in weeks:  5  Discussed with:  Patient    Functional Limitation G-Codes and Severity Modifiers  PT Functional Assessment Tool Used: SPADI  PT Functional Assessment Score: 41/130   Current: Carry: Current (): CJ   Goal: Carry: Goal  (): CI     Referring provider co-signature:  I have reviewed this plan of care and my co-signature certifies the need for services.  Certification Dates:   From 2-13-18     To 4-13-18    Physician Signature: ________________________________ Date: ______________

## 2018-02-15 ENCOUNTER — APPOINTMENT (OUTPATIENT)
Dept: PHYSICAL THERAPY | Facility: REHABILITATION | Age: 68
End: 2018-02-15
Attending: FAMILY MEDICINE
Payer: MEDICARE

## 2018-02-16 ENCOUNTER — PHYSICAL THERAPY (OUTPATIENT)
Dept: PHYSICAL THERAPY | Facility: REHABILITATION | Age: 68
End: 2018-02-16
Attending: FAMILY MEDICINE
Payer: MEDICARE

## 2018-02-16 DIAGNOSIS — M19.012 OSTEOARTHRITIS OF LEFT SHOULDER, UNSPECIFIED OSTEOARTHRITIS TYPE: ICD-10-CM

## 2018-02-16 PROCEDURE — 97110 THERAPEUTIC EXERCISES: CPT

## 2018-02-16 NOTE — OP THERAPY DAILY TREATMENT
Outpatient Physical Therapy  DAILY TREATMENT     [facility] [place]  [address], [address]  [facility] [address]  Phone:  [phone]  Fax:  [phone]    Date: 02/16/2018    Patient: Carlos Pulido  YOB: 1950  MRN: [id number]     Time Calculation  Start time: 1015  Stop time: 1045 Time Calculation (min): 30 minutes     Chief Complaint: Shoulder Problem    Visit #: 2    SUBJECTIVE:  Pt notes that overall his shoulder is feeling a lot better and that the exercises he has been doing have helped a lot.   OBJECTIVE:  Current objective measures: Shoulder Flexion 125, abduction 130 AROM.        Therapeutic Exercises (CPT 33592):     Total treatment time spent on Therapeutic Exercises: 30 minutes.      1. Pulleys, 5min scaption    2. Rows, green 2x20    3. Shoulder extension, green 2x20     4. Ball rolls up wall, x 20    5. Scap push against wall , x 20    6. Scap pull bar/wall, x20    7. Wall walks , x10    8. Tband IR/ER, green band x 20 each       Pain rating before treatment: 0 at rest, 3/10 with end range movements  Pain rating after treatment: 0, 3/10 with end range movements    ASSESSMENT:   Response to treatment: PT tolerated treatment well and has improved pain free ROM. Prom> active ROM. Signs and symptoms consistent with shoulder impingement.     PLAN/RECOMMENDATIONS:   Plan for treatment: therapy treatment to continue next visit.  Planned interventions for next visit: continue with current treatment.

## 2018-02-20 ENCOUNTER — APPOINTMENT (OUTPATIENT)
Dept: PHYSICAL THERAPY | Facility: REHABILITATION | Age: 68
End: 2018-02-20
Attending: FAMILY MEDICINE
Payer: MEDICARE

## 2018-02-22 ENCOUNTER — APPOINTMENT (OUTPATIENT)
Dept: PHYSICAL THERAPY | Facility: REHABILITATION | Age: 68
End: 2018-02-22
Attending: FAMILY MEDICINE
Payer: MEDICARE

## 2018-02-27 ENCOUNTER — TELEPHONE (OUTPATIENT)
Dept: ENDOCRINOLOGY | Facility: MEDICAL CENTER | Age: 68
End: 2018-02-27

## 2018-02-27 ENCOUNTER — APPOINTMENT (OUTPATIENT)
Dept: PHYSICAL THERAPY | Facility: REHABILITATION | Age: 68
End: 2018-02-27
Attending: FAMILY MEDICINE
Payer: MEDICARE

## 2018-02-27 DIAGNOSIS — E11.65 TYPE 2 DIABETES MELLITUS WITH HYPERGLYCEMIA, WITH LONG-TERM CURRENT USE OF INSULIN (HCC): ICD-10-CM

## 2018-02-27 DIAGNOSIS — Z79.4 TYPE 2 DIABETES MELLITUS WITH HYPERGLYCEMIA, WITH LONG-TERM CURRENT USE OF INSULIN (HCC): ICD-10-CM

## 2018-03-01 ENCOUNTER — APPOINTMENT (OUTPATIENT)
Dept: PHYSICAL THERAPY | Facility: REHABILITATION | Age: 68
End: 2018-03-01
Attending: FAMILY MEDICINE
Payer: MEDICARE

## 2018-03-02 ENCOUNTER — APPOINTMENT (OUTPATIENT)
Dept: PHYSICAL THERAPY | Facility: REHABILITATION | Age: 68
End: 2018-03-02
Attending: FAMILY MEDICINE
Payer: MEDICARE

## 2018-03-06 ENCOUNTER — APPOINTMENT (OUTPATIENT)
Dept: PHYSICAL THERAPY | Facility: REHABILITATION | Age: 68
End: 2018-03-06
Attending: FAMILY MEDICINE
Payer: MEDICARE

## 2018-03-09 ENCOUNTER — APPOINTMENT (OUTPATIENT)
Dept: PHYSICAL THERAPY | Facility: REHABILITATION | Age: 68
End: 2018-03-09
Attending: FAMILY MEDICINE
Payer: MEDICARE

## 2018-03-13 ENCOUNTER — NON-PROVIDER VISIT (OUTPATIENT)
Dept: HEALTH INFORMATION MANAGEMENT | Facility: MEDICAL CENTER | Age: 68
End: 2018-03-13
Payer: MEDICARE

## 2018-03-13 PROCEDURE — G0109 DIAB MANAGE TRN IND/GROUP: HCPCS | Performed by: INTERNAL MEDICINE

## 2018-03-13 NOTE — PROGRESS NOTES
Attended Type 2 Self Management Class on : 3/13/18  Time: 4382-7886  Has had diabetes since: 1986  Medications for diabetes: NPH 32 units bid , Regular insulin 16 units with meals and Metformin 1000 mg bid  Exercise: goes to Pilates 2 times a week and going to Physical Therapy several times a week  Eye Exam: current.   Foot Exam: current.     Diabetes Education Content    Introduction To Diabetes   Define Type 2 diabetes: Education taught  Define Type 1 diabetes: Education taught  Understand feaures and benefits of education and management: Education taught  Describe who is responsible for diabetes management: Education taught  Describe impact of diabetes on family/friends: Education taught  Discuss role of significant other in management: Education taught  Diabetes Lifestyle Changes / Goals  State benefits of making appropriate lifestyle changes: Education taught  Identify lifestyle behaviors participant wants to change: Education taught  Identify risk factors that interfere with health and strategies to reduce : Education taught  Verbalize need for and frequency of health care follow-up: Education taught  Develop behavioral objectives and expected health outcomes: Education taught  Diabetes Exercise and Activity  Describe role of exercise in diabetes management: Education taught  State relationship of exercise to blood sugar: Education taught  State the benefits/risk(s) of exercise and precautions to follow: Education taught  Diabetes Self Blood Glucose Monitoring  Discuss rationale and importance of SBGM: Education taught  Discuss appropriate record keeping: Education taught  Discuss how to use results from blood glucose testing: Education taught  Evaluation and interpretation of blood glucose patterns: Education taught  Diabetes Disease Process  Discuss signs, symptoms, TX and prevention of hyperglycemia: Education taught  Discuss beta cell dysfunction and insulin resistance: Education taught  Discuss  "Insulin and its role in the body: Education taught  Discuss the role of the liver in glucose metabolism: Education taught  Discuss hormonal regulation: Education taught  Define benefits of good control and discuss what it means to be in \"good control\": Education taught  Discuss impact of exercise, food, meds, stress, and special factors on diabetes: Education taught  Discuss when to confer with HCP for possible treatment plan adjustments: Education taught  Diabetes Insulin and Medications  Action of oral medications, onset and duration: Education taught  Discuss incretin secretagogs and their use in diabetes management: Education taught  Identify the onset, peak and duration of different insulin: Education taught  Discuss proper injection technique and site rotation: Education taught  Discuss storage of insulin and disposal of sharps: Education taught  Hypoglycemia  List signs, symptoms and causes of hypoglycemia: Education taught  Discuss physiology of hypoglycemic reactions: Education taught  Accurately describe appropriate treatment and prevention: Education taught  Discuss when to contact HCP: Education taught  Sick Day Care  Verbalize important items to monitor when sick and when to contact HCP: Education taught  Review sick day box: Education taught  State diabetes medication adjustments on sick days: Education taught  Complications (Chronic)  Explain prevention, TX, signs/symptoms of: retinopathy, neuropathy, nephropathy, infections: Education taught  Explain prevention, TX, signs/symptoms of: CAD, cerebral-vascular disease and sexual dysfunction: Education taught  Identify when to notify HCP of complications: Education taught  State principles of skin, dental and foot care.  Discuss proper foot care, prevention of foot probelms when to notify HCP>: Education taught  Demonstrate how to examine feet and what to look for: Education taught  Psychosocial adjustment  Identify sources of stress: Education " taught  Identify resources and techniques for stress reduction: Education taught  Discuss health care referral network / community resources for support: Education taught  Define proper precautions to use when traveling: Education taught

## 2018-03-13 NOTE — LETTER
March 13, 2018          RE: Carlos Pulido  [id number]    Dear: Catarino Molina MD     The above referenced patient received 2 hours of diabetes education from [facility].    Topics taught (may include but not limited to):  Introduction to diabetes, benefits and responsibilities of patient, physiology of diabetes and the diease process, benefits of blood glucose monitoring and record keeping, medication action and possible side effects, hypoglycemia, sick day management, exercise, stress reduction and travel with diabetes.     Provided meter and instructed in use: no. Pt using Relion Prime and Accucheck Marie meter.    Dilated eye exam within the past year: yes Goal is for patient to have yearly.   Lipid panel:   Lab Results   Component Value Date/Time    CHOLSTRLTOT 152 04/07/2017 10:01 AM    LDL 78 04/07/2017 10:01 AM    HDL 44 04/07/2017 10:01 AM    TRIGLYCERIDE 149 04/07/2017 10:01 AM    Micro-albumin/Creat. Ratio: 968  Date: 4/2017  Goal is less than 20 ng/dl  HbA1c:   Lab Results   Component Value Date/Time    HBA1C 9.1 (H) 01/29/2018 09:39 AM   * Goal is <8% in the next 3 months.  Daily aspirin use if >30 years old w/o contradictions:yes dose :81 mg    Patient/caregiver appeared to understand the content as demonstrated by appropriate questions.     Notes:  Carlos has attended this program in the past so this was a refresher course.   He states he has been overdoing the carbohydrates in his diet, we discussed appropriate amounts being no more than 60 gm with meals and no more than 15 gm if eating a small snack.   Encouraged to exercise more, currently doing low impact pilates 2 times a week.   Complains of knee pain.   Has appointment with endocrinologist tomorrow.     The patient was provided with written materials to back-up verbal education.   Thank you for this consultation,     Connie Lawrence R.N., CDE  Certified Diabetes Nurse Educator

## 2018-03-13 NOTE — LETTER
RE: Carlos Pulido  [id number]    Dear: Dr. Tanner     The above referenced patient received 2 hours of diabetes education from [facility].    Topics taught (may include but not limited to):  Introduction to diabetes, benefits and responsibilities of patient, physiology of diabetes and the diease process, benefits of blood glucose monitoring and record keeping, medication action and possible side effects, hypoglycemia, sick day management, exercise, stress reduction and travel with diabetes.     Provided meter and instructed in use: no. Pt using Relion Prime and Accucheck Marie meter.    Dilated eye exam within the past year: yes Goal is for patient to have yearly.   Lipid panel:   Lab Results   Component Value Date/Time    CHOLSTRLTOT 152 04/07/2017 10:01 AM    LDL 78 04/07/2017 10:01 AM    HDL 44 04/07/2017 10:01 AM    TRIGLYCERIDE 149 04/07/2017 10:01 AM    Micro-albumin/Creat. Ratio: 968  Date: 4/2017  Goal is less than 20 ng/dl  HbA1c:   Lab Results   Component Value Date/Time    HBA1C 9.1 (H) 01/29/2018 09:39 AM   * Goal is <8% in the next 3 months.  Daily aspirin use if >30 years old w/o contradictions:yes dose :81 mg    Patient/caregiver appeared to understand the content as demonstrated by appropriate questions.     Notes:  Carlos has attended this program in the past so this was a refresher course.   He states he has been overdoing the carbohydrates in his diet, we discussed appropriate amounts being no more than 60 gm with meals and no more than 15 gm if eating a small snack.   Encouraged to exercise more, currently doing low impact pilates 2 times a week.   Complains of knee pain.   Has appointment with you tomorrow.    Interested in referral to [facility]     The patient was provided with written materials to back-up verbal education.   Thank you for this consultation,     Connie Lawrence R.N., CDE  Certified Diabetes Nurse Educator

## 2018-03-14 ENCOUNTER — OFFICE VISIT (OUTPATIENT)
Dept: ENDOCRINOLOGY | Facility: MEDICAL CENTER | Age: 68
End: 2018-03-14
Payer: MEDICARE

## 2018-03-14 ENCOUNTER — APPOINTMENT (OUTPATIENT)
Dept: PHYSICAL THERAPY | Facility: REHABILITATION | Age: 68
End: 2018-03-14
Attending: FAMILY MEDICINE
Payer: MEDICARE

## 2018-03-14 ENCOUNTER — TELEPHONE (OUTPATIENT)
Dept: PHYSICAL MEDICINE AND REHAB | Facility: MEDICAL CENTER | Age: 68
End: 2018-03-14

## 2018-03-14 VITALS
OXYGEN SATURATION: 93 % | SYSTOLIC BLOOD PRESSURE: 159 MMHG | DIASTOLIC BLOOD PRESSURE: 82 MMHG | HEART RATE: 97 BPM | WEIGHT: 213 LBS | HEIGHT: 67 IN | BODY MASS INDEX: 33.43 KG/M2

## 2018-03-14 DIAGNOSIS — E78.2 MIXED HYPERLIPIDEMIA: ICD-10-CM

## 2018-03-14 DIAGNOSIS — Z79.4 TYPE 2 DIABETES MELLITUS WITH HYPERGLYCEMIA, WITH LONG-TERM CURRENT USE OF INSULIN (HCC): ICD-10-CM

## 2018-03-14 DIAGNOSIS — I10 ESSENTIAL HYPERTENSION: ICD-10-CM

## 2018-03-14 DIAGNOSIS — E11.65 TYPE 2 DIABETES MELLITUS WITH HYPERGLYCEMIA, WITH LONG-TERM CURRENT USE OF INSULIN (HCC): ICD-10-CM

## 2018-03-14 DIAGNOSIS — F41.9 ANXIETY: Chronic | ICD-10-CM

## 2018-03-14 PROCEDURE — 99214 OFFICE O/P EST MOD 30 MIN: CPT | Performed by: INTERNAL MEDICINE

## 2018-03-14 RX ORDER — PAROXETINE HYDROCHLORIDE 20 MG/1
TABLET, FILM COATED ORAL
Qty: 90 TAB | Refills: 1 | Status: SHIPPED | OUTPATIENT
Start: 2018-03-14 | End: 2018-08-20 | Stop reason: SDUPTHER

## 2018-03-14 NOTE — PROGRESS NOTES
"Endocrinology Clinic Progress Note    CC: Type 2 diabetes    HPI:  Carlos Pulido is a 67 y.o. old patient who comes in today for routine follow up.     Type 2 diabetes: He is currently on NPH 32 units twice a day and regular insulin 10-16 units 3 times a day with meals. We did try to prescribe NovoLog and Tresiba at last clinic visit however his co-pay was over $500 so he continues to be on N and R. Also on metformin 1000 mg twice a day. He checks blood sugars 4-6 times a day. Glycemic control is slightly improved. Some days fasting blood sugars are well below 140 but some days it is still in 200s. Pre-meal blood sugar readings are improving. No hypoglycemia. He has some burning sensation in feet. He is up-to-date with eye exam.    Hypertension: Blood pressure is slightly higher than goal in the clinic today. He is on ACE inhibitor.    Hyperlipidemia: He is currently on Lipitor, tolerated well.    ROS:  Constitutional: No unintentional weight loss  Endo: Denies excessive thirst or frequent urination    PMH:  Patient Active Problem List   Diagnosis   • Hypertension   • Restless legs syndrome (RLS)   • PATTI (obstructive sleep apnea)   • Low testosterone   • Anxiety   • External hemorrhoid, bleeding   • Chronic insomnia   • Obesity (BMI 30.0-34.9)   • Uncontrolled type 2 diabetes mellitus with insulin therapy (CMS-[facility])   • Non compliance w medication regimen   • Meralgia paraesthetica, right   • Chronic pain of both knees   • Ear fullness, left       EXAM:  Vital signs: /82   Pulse 97   Ht 1.702 m (5' 7\")   Wt 96.6 kg (213 lb)   SpO2 93%   BMI 33.36 kg/m²   General: No apparent distress, cooperative  Eyes: No scleral icterus, no discharge  Neck: Normal on external inspection  Resp: Normal effort  Musculoskeletal: Normal gait  Extremities: No lower extremity edema  Skin: No rash on visible skin  Psych: Alert and oriented, normal mood and affect    Assessment and Plan:    1. Type 2 diabetes mellitus with " hyperglycemia, with long-term current use of insulin (CMS-[facility])  · Hemoglobin A1c in January was 9.1%  · Goal A1c less than 7%  · Continue NPH 32 units twice a day and regular insulin 10-16 units with each meal  · Continue metformin 1000 mg twice a day  · Advised to continue checking blood sugars 4-6 times a day and to keep a log    2. Mixed hyperlipidemia  · Continue Lipitor    3. Essential hypertension  · Continue ACEi      Repeat labs just before next appointment: BMP, A1c, urine microalbumin creatinine ratio, TSH    Return in about 2 months (around 5/14/2018).    Thank you for allowing me to participate in the care of this patient.    Maria D Rhodes M.D.    CC:   Catarino Molina M.D.    This note was created using voice recognition software (Dragon). The accuracy of the dictation is limited by the abilities of the software. I have reviewed the note prior to signing, however some errors in grammar and context are still possible. If you have any questions related to this note please do not hesitate to contact our office.

## 2018-03-16 ENCOUNTER — APPOINTMENT (OUTPATIENT)
Dept: HEALTH INFORMATION MANAGEMENT | Facility: MEDICAL CENTER | Age: 68
End: 2018-03-16
Payer: MEDICARE

## 2018-03-20 ENCOUNTER — OFFICE VISIT (OUTPATIENT)
Dept: PHYSICAL MEDICINE AND REHAB | Facility: MEDICAL CENTER | Age: 68
End: 2018-03-20
Payer: MEDICARE

## 2018-03-20 VITALS
SYSTOLIC BLOOD PRESSURE: 136 MMHG | DIASTOLIC BLOOD PRESSURE: 82 MMHG | BODY MASS INDEX: 33.9 KG/M2 | HEIGHT: 67 IN | HEART RATE: 94 BPM | TEMPERATURE: 98.4 F | OXYGEN SATURATION: 95 % | WEIGHT: 216 LBS

## 2018-03-20 DIAGNOSIS — M25.562 LEFT KNEE PAIN, UNSPECIFIED CHRONICITY: ICD-10-CM

## 2018-03-20 DIAGNOSIS — E11.42 DIABETIC PERIPHERAL NEUROPATHY (HCC): ICD-10-CM

## 2018-03-20 DIAGNOSIS — M25.559 ARTHRALGIA OF HIP, UNSPECIFIED LATERALITY: ICD-10-CM

## 2018-03-20 DIAGNOSIS — M79.2 NERVE PAIN: ICD-10-CM

## 2018-03-20 DIAGNOSIS — Z98.890 H/O KNEE SURGERY: ICD-10-CM

## 2018-03-20 DIAGNOSIS — M25.569 KNEE PAIN, UNSPECIFIED CHRONICITY, UNSPECIFIED LATERALITY: ICD-10-CM

## 2018-03-20 DIAGNOSIS — M25.561 RIGHT KNEE PAIN, UNSPECIFIED CHRONICITY: ICD-10-CM

## 2018-03-20 DIAGNOSIS — M25.519 SHOULDER PAIN, UNSPECIFIED CHRONICITY, UNSPECIFIED LATERALITY: ICD-10-CM

## 2018-03-20 PROCEDURE — 99214 OFFICE O/P EST MOD 30 MIN: CPT | Performed by: PHYSICAL MEDICINE & REHABILITATION

## 2018-03-22 ENCOUNTER — HOSPITAL ENCOUNTER (OUTPATIENT)
Dept: RADIOLOGY | Facility: MEDICAL CENTER | Age: 68
End: 2018-03-22
Attending: PHYSICAL MEDICINE & REHABILITATION
Payer: MEDICARE

## 2018-03-22 DIAGNOSIS — Z98.890 H/O KNEE SURGERY: ICD-10-CM

## 2018-03-22 DIAGNOSIS — M25.561 RIGHT KNEE PAIN, UNSPECIFIED CHRONICITY: ICD-10-CM

## 2018-03-22 PROCEDURE — 73700 CT LOWER EXTREMITY W/O DYE: CPT | Mod: RT

## 2018-04-18 ENCOUNTER — TELEPHONE (OUTPATIENT)
Dept: PHYSICAL THERAPY | Facility: REHABILITATION | Age: 68
End: 2018-04-18

## 2018-04-18 NOTE — OP THERAPY DISCHARGE SUMMARY
Outpatient Physical Therapy  DISCHARGE SUMMARY NOTE      [facility] [place]  [address], [address]  [place] [address]  Phone:  [phone]  Fax:  [phone]    Date of Visit: 04/18/2018    Patient: Carlos Pulido  YOB: 1950  MRN: [id number]     Referring Provider: Nima Reyes M.D.   Referring Diagnosis Shoulder arthritis [M19.019]     Physical Therapy Occurrence Codes    Date physical therapy care plan established or reviewed:  2/13/18   Date physical therapy treatment started:  2/13/18          Functional Limitation G-Codes and Severity Modifiers      Goal:     Discharge:         Your patient is being discharged from Physical Therapy with the following comments:   · Patient has been non-compliant with physical therapy plan of care    Comments:  Pt completed 2 visits of physical therapy including evaluation. He reported improve symptoms overall but did not return to the clinic after that. Pt has been discharged from physical therapy due to lapse of care greater than 30 days.       Limitations Remaining:  See last note    Recommendations:  Continue HEP, f/u with M.D as needed.     Adalberto Thomas, PT, DPT    Date: 4/18/2018

## 2018-07-13 ENCOUNTER — OFFICE VISIT (OUTPATIENT)
Dept: MEDICAL GROUP | Facility: PHYSICIAN GROUP | Age: 68
End: 2018-07-13
Payer: MEDICARE

## 2018-07-13 VITALS
SYSTOLIC BLOOD PRESSURE: 142 MMHG | WEIGHT: 204.4 LBS | HEART RATE: 100 BPM | DIASTOLIC BLOOD PRESSURE: 80 MMHG | RESPIRATION RATE: 18 BRPM | BODY MASS INDEX: 32.08 KG/M2 | HEIGHT: 67 IN | OXYGEN SATURATION: 96 % | TEMPERATURE: 97.7 F

## 2018-07-13 DIAGNOSIS — G89.29 CHRONIC PAIN OF BOTH KNEES: ICD-10-CM

## 2018-07-13 DIAGNOSIS — M25.561 CHRONIC PAIN OF BOTH KNEES: ICD-10-CM

## 2018-07-13 DIAGNOSIS — K59.01 SLOW TRANSIT CONSTIPATION: ICD-10-CM

## 2018-07-13 DIAGNOSIS — I10 ESSENTIAL HYPERTENSION: Chronic | ICD-10-CM

## 2018-07-13 DIAGNOSIS — M25.562 CHRONIC PAIN OF BOTH KNEES: ICD-10-CM

## 2018-07-13 PROBLEM — K59.00 CONSTIPATION: Status: ACTIVE | Noted: 2018-07-13

## 2018-07-13 PROBLEM — H93.8X2 EAR FULLNESS, LEFT: Status: RESOLVED | Noted: 2017-12-18 | Resolved: 2018-07-13

## 2018-07-13 PROBLEM — Z78.9 HISTORY OF EXCESSIVE CERUMEN: Status: ACTIVE | Noted: 2018-07-13

## 2018-07-13 LAB
HBA1C MFR BLD: 9.9 % (ref ?–5.8)
INT CON NEG: NEGATIVE
INT CON POS: POSITIVE

## 2018-07-13 PROCEDURE — 99214 OFFICE O/P EST MOD 30 MIN: CPT | Performed by: FAMILY MEDICINE

## 2018-07-13 PROCEDURE — 83036 HEMOGLOBIN GLYCOSYLATED A1C: CPT | Performed by: FAMILY MEDICINE

## 2018-07-13 NOTE — PROGRESS NOTES
"Complaint: Review medical treatment     Subjective:     Carlos Pulido is a 68 y.o. male here today accompanied by his ex-wife. Currently living in a hotel. Moved here from [place]. Does not want to continue seeing MD there.    Uncontrolled type 2 diabetes mellitus with insulin therapy (CMS-[facility])  BS running high. On NPH 32 u bid, regular 16u tid, metformin 1g bid. Usually sees Dr. Rhodes for management. Never been on Jardiance. Not getting much exercise. Checks his BS 3-4x/day.No recent labs. Denies any rashes. Pain in right thigh. Tingling/numbness in feet. UTD on dental and eye exams. Would like to see local endocrinologist if possible.    Chronic pain of both knees  Has been seeing Dr. Roberts, PM&R. Had bilateral TKR. Pain off& on. Would like to see local doc.    Constipation  Having trouble keeping regular.     I reviewed past visit notes dora labs.    Current medicines (including changes today)  Current Outpatient Prescriptions   Medication Sig Dispense Refill   • glucose blood (ACCU-CHEK AMY PLUS) strip USE ONE STRIP TO CHECK GLUCOSE 4 TIMES DAILY **E11.65** 150 Strip 2   • Empagliflozin 10 MG Tab Take 1 Tab by mouth every day. 30 Tab 5   • insulin regular (NOVOLIN R RELION) 100 Unit/mL Solution Inject 16 Units as instructed 3 times a day before meals. 20 mL 6   • RELION INSULIN SYRINGE 31G X 15/64\" 0.3 ML Misc Inject 1 Each as instructed 5 Times a Day. 150 Each 6   • PARoxetine (PAXIL) 20 MG Tab TAKE ONE TABLET BY MOUTH ONCE DAILY 90 Tab 1   • metFORMIN ER (GLUCOPHAGE XR) 500 MG TABLET SR 24 HR TAKE TWO TABLETS BY MOUTH TWICE DAILY 360 Tab 1   • insulin NPH (NOVOLIN N RELION) 100 UNIT/ML Suspension Inject 32 Units as instructed 2 Times a Day. (Patient taking differently: Inject 20 Units as instructed 2 Times a Day.) 20 mL 6   • Insulin Pen Needle (BD PEN NEEDLE DAVIE U/F) 32G X 4 MM Misc For insulin shots 4 times a day 150 Each 6   • gabapentin (NEURONTIN) 100 MG Cap TAKE ONE CAPSULE BY MOUTH AT NIGHT FOR 3 " "DAYS, THEN TWO CAPSULES AT NIGHT FOR 30 DAYS, THEN THREE CAPSULES PER NIGHT THEREAFTER 270 Cap 1   • Diclofenac Sodium (VOLTAREN) 1 % Gel Apply 4 g to skin as directed 3 times a day as needed. 1 Tube 0   • lidocaine (LIDODERM) 5 % Patch Apply 1 Patch to skin as directed every 24 hours. as needed for nerve pain. 30 Patch 2   • lisinopril (PRINIVIL, ZESTRIL) 40 MG tablet TAKE ONE TABLET BY MOUTH ONCE DAILY 90 Tab 3   • hydrochlorothiazide (HYDRODIURIL) 25 MG Tab TAKE ONE TABLET BY MOUTH ONCE DAILY 90 Tab 3   • atorvastatin (LIPITOR) 40 MG Tab TAKE ONE TABLET BY MOUTH ONCE DAILY 90 Tab 3   • amlodipine (NORVASC) 5 MG Tab TAKE ONE TABLET BY MOUTH ONCE DAILY 90 Tab 3   • Insulin Syringe-Needle U-100 (INSULIN SYRINGE .3CC/31GX5/16\") 31G X 5/16\" 0.3 ML Misc 1 Syringe by Does not apply route 5 Times a Day. 200 Each 5   • Lancets Misc Lancets order:   Accucheck Soft Clix lancets  Testing blood sugars 5 times per day for insulin adjustment. 1 Each 12   • aspirin 81 MG tablet Take 81 mg by mouth every day.       No current facility-administered medications for this visit.      He  has a past medical history of Anxiety (4/16/2013); Hypertension (4/16/2013); Low testosterone (4/16/2013); Meralgia paraesthetica, right (12/18/2017); PATTI (obstructive sleep apnea) (4/16/2013); and Restless legs syndrome (RLS) (4/16/2013).    Health Maintenance: needs labs only      Allergies: Amoxicillin and Hydrocodone    Current Outpatient Prescriptions Ordered in [facility]   Medication Sig Dispense Refill   • glucose blood (ACCU-CHEK AMY PLUS) strip USE ONE STRIP TO CHECK GLUCOSE 4 TIMES DAILY **E11.65** 150 Strip 2   • Empagliflozin 10 MG Tab Take 1 Tab by mouth every day. 30 Tab 5   • insulin regular (NOVOLIN R RELION) 100 Unit/mL Solution Inject 16 Units as instructed 3 times a day before meals. 20 mL 6   • RELION INSULIN SYRINGE 31G X 15/64\" 0.3 ML Misc Inject 1 Each as instructed 5 Times a Day. 150 Each 6   • PARoxetine (PAXIL) 20 MG Tab TAKE " "ONE TABLET BY MOUTH ONCE DAILY 90 Tab 1   • metFORMIN ER (GLUCOPHAGE XR) 500 MG TABLET SR 24 HR TAKE TWO TABLETS BY MOUTH TWICE DAILY 360 Tab 1   • insulin NPH (NOVOLIN N RELION) 100 UNIT/ML Suspension Inject 32 Units as instructed 2 Times a Day. (Patient taking differently: Inject 20 Units as instructed 2 Times a Day.) 20 mL 6   • Insulin Pen Needle (BD PEN NEEDLE DAVIE U/F) 32G X 4 MM Misc For insulin shots 4 times a day 150 Each 6   • gabapentin (NEURONTIN) 100 MG Cap TAKE ONE CAPSULE BY MOUTH AT NIGHT FOR 3 DAYS, THEN TWO CAPSULES AT NIGHT FOR 30 DAYS, THEN THREE CAPSULES PER NIGHT THEREAFTER 270 Cap 1   • Diclofenac Sodium (VOLTAREN) 1 % Gel Apply 4 g to skin as directed 3 times a day as needed. 1 Tube 0   • lidocaine (LIDODERM) 5 % Patch Apply 1 Patch to skin as directed every 24 hours. as needed for nerve pain. 30 Patch 2   • lisinopril (PRINIVIL, ZESTRIL) 40 MG tablet TAKE ONE TABLET BY MOUTH ONCE DAILY 90 Tab 3   • hydrochlorothiazide (HYDRODIURIL) 25 MG Tab TAKE ONE TABLET BY MOUTH ONCE DAILY 90 Tab 3   • atorvastatin (LIPITOR) 40 MG Tab TAKE ONE TABLET BY MOUTH ONCE DAILY 90 Tab 3   • amlodipine (NORVASC) 5 MG Tab TAKE ONE TABLET BY MOUTH ONCE DAILY 90 Tab 3   • Insulin Syringe-Needle U-100 (INSULIN SYRINGE .3CC/31GX5/16\") 31G X 5/16\" 0.3 ML Misc 1 Syringe by Does not apply route 5 Times a Day. 200 Each 5   • Lancets Misc Lancets order:   Accucheck Soft Clix lancets  Testing blood sugars 5 times per day for insulin adjustment. 1 Each 12   • aspirin 81 MG tablet Take 81 mg by mouth every day.       No current [facility]-ordered facility-administered medications on file.        Past Medical History:   Diagnosis Date   • Anxiety 4/16/2013    As needed for anxiety   • Hypertension 4/16/2013   • Low testosterone 4/16/2013   • Meralgia paraesthetica, right 12/18/2017   • PATTI (obstructive sleep apnea) 4/16/2013    Cannot tolerate sleep apnea   • Restless legs syndrome (RLS) 4/16/2013       Past Surgical History: " "  Procedure Laterality Date   • KNEE REPLACEMENT, TOTAL Right 2011    done in [facility]   • KNEE REPLACEMENT, TOTAL Left 2009    done in [facility]   • RHINOPLASTY         Social History   Substance Use Topics   • Smoking status: Never Smoker   • Smokeless tobacco: Never Used   • Alcohol use 0.0 oz/week      Comment: x2 drinks monthly       Social History     Social History Narrative    Retired from construction (pipe line work)       Family History   Problem Relation Age of Onset   • Diabetes Mother    • Diabetes Father    • Diabetes Sister    • Diabetes Brother    • No Known Problems Brother    • Heart Disease Neg Hx    • Heart Failure Neg Hx          ROS   Patient denies any fever, chills, unintentional weight gain/loss, fatigue, stroke symptoms, dizziness, headache, nasal congestion, sore-throat, cough, heartburn, chest pain, difficulty breathing, abdominal discomfort, diarrhea/constipation, burning with urination or frequency, joint or back pain, skin rashes, depression or anxiety.       Objective:     Blood pressure 142/80, pulse 100, temperature 36.5 °C (97.7 °F), resp. rate 18, height 1.702 m (5' 7\"), weight 92.7 kg (204 lb 6.4 oz), SpO2 96 %. Body mass index is 32.01 kg/m².   Physical Exam:  Constitutional: Alert, no distress.  No formal exam today  Psych: Alert and oriented x3, appropriate affect and mood.        Assessment and Plan:   The following treatment plan was discussed    1. Uncontrolled type 2 diabetes mellitus with complication, with long-term current use of insulin (HCC)  Chronic problem, uncontrolled. A1c 9.9 today. Will have patient find out if Jardiance covered.  - glucose blood (ACCU-CHEK AMY PLUS) strip; USE ONE STRIP TO CHECK GLUCOSE 4 TIMES DAILY **E11.65**  Dispense: 150 Strip; Refill: 2  - - Empagliflozin 10 MG Tab; Take 1 Tab by mouth every day.  Dispense: 30 Tab; Refill: 5    - COMP METABOLIC PANEL; Future  - LIPID PROFILE; Future  - MICROALBUMIN CREAT RATIO " URINE; Future      - Patient identified as having weight management issue.  Appropriate orders and counseling given.    - REFERRAL TO ENDOCRINOLOGY    2. Slow transit constipation  Chronic problem, uncontrolled. Recommend increase fluid intake, otc Miralax as needed.    3. Chronic pain of both knees  Chronic problem. Uncontrolled. Will refer to local ortho for opinion.  - REFERRAL TO ORTHOPEDICS    4. Hypertension  Chronic problem, uncontrolled. Recheck at f/u, may need to increase Norvasc.    Followup: After return from [place].    Please note that this dictation was created using voice recognition software. I have made every reasonable attempt to correct obvious errors, but I expect that there are errors of grammar and possibly content that I did not discover before finalizing the note.

## 2018-07-13 NOTE — ASSESSMENT & PLAN NOTE
BS running high. On NPH 32 u bid, regular 16u tid, metformin 1g bid. Usually sees Dr. Rhodes for management. Never been on Jardiance. Not getting much exercise. No recent labs. Denies any rashes. Pain in right thigh. Tingling/numbness in feet. UTD on dental and eye exams. Would like to see local endocrinologist if possible.

## 2018-07-24 DIAGNOSIS — Z79.4 TYPE 2 DIABETES MELLITUS WITH HYPERGLYCEMIA, WITH LONG-TERM CURRENT USE OF INSULIN (HCC): ICD-10-CM

## 2018-07-24 DIAGNOSIS — E11.65 TYPE 2 DIABETES MELLITUS WITH HYPERGLYCEMIA, WITH LONG-TERM CURRENT USE OF INSULIN (HCC): ICD-10-CM

## 2018-07-24 NOTE — TELEPHONE ENCOUNTER
Was the patient seen in the last year in this department? Yes     Does patient have an active prescription for medications requested? Yes     Received Request Via: Pharmacy     Last visit 7/13/2018  Last labs 6/28/2017

## 2018-08-15 ENCOUNTER — PATIENT OUTREACH (OUTPATIENT)
Dept: HEALTH INFORMATION MANAGEMENT | Facility: OTHER | Age: 68
End: 2018-08-15

## 2018-08-15 NOTE — PROGRESS NOTES
Outcome: Left Message    Please transfer to Patient Outreach Team at [phone] when patient returns call.    WebIZ Checked & Epic Updated:  yes    Attempt # 1

## 2018-08-17 ENCOUNTER — HOSPITAL ENCOUNTER (OUTPATIENT)
Dept: LAB | Facility: MEDICAL CENTER | Age: 68
End: 2018-08-17
Attending: FAMILY MEDICINE
Payer: MEDICARE

## 2018-08-17 ENCOUNTER — OFFICE VISIT (OUTPATIENT)
Dept: MEDICAL GROUP | Facility: PHYSICIAN GROUP | Age: 68
End: 2018-08-17
Payer: MEDICARE

## 2018-08-17 VITALS
HEIGHT: 67 IN | RESPIRATION RATE: 16 BRPM | DIASTOLIC BLOOD PRESSURE: 68 MMHG | OXYGEN SATURATION: 95 % | HEART RATE: 95 BPM | SYSTOLIC BLOOD PRESSURE: 124 MMHG | WEIGHT: 207 LBS | TEMPERATURE: 97.9 F | BODY MASS INDEX: 32.49 KG/M2

## 2018-08-17 DIAGNOSIS — E66.9 OBESITY (BMI 30.0-34.9): ICD-10-CM

## 2018-08-17 DIAGNOSIS — F41.9 ANXIETY: Chronic | ICD-10-CM

## 2018-08-17 DIAGNOSIS — K59.01 SLOW TRANSIT CONSTIPATION: ICD-10-CM

## 2018-08-17 DIAGNOSIS — E11.65 TYPE 2 DIABETES MELLITUS WITH HYPERGLYCEMIA, WITH LONG-TERM CURRENT USE OF INSULIN (HCC): ICD-10-CM

## 2018-08-17 DIAGNOSIS — Z79.4 TYPE 2 DIABETES MELLITUS WITH HYPERGLYCEMIA, WITH LONG-TERM CURRENT USE OF INSULIN (HCC): ICD-10-CM

## 2018-08-17 DIAGNOSIS — G57.11 MERALGIA PARAESTHETICA, RIGHT: ICD-10-CM

## 2018-08-17 LAB
ALBUMIN SERPL BCP-MCNC: 3.9 G/DL (ref 3.2–4.9)
ALBUMIN/GLOB SERPL: 1.1 G/DL
ALP SERPL-CCNC: 85 U/L (ref 30–99)
ALT SERPL-CCNC: 19 U/L (ref 2–50)
ANION GAP SERPL CALC-SCNC: 10 MMOL/L (ref 0–11.9)
AST SERPL-CCNC: 15 U/L (ref 12–45)
BASOPHILS # BLD AUTO: 1.3 % (ref 0–1.8)
BASOPHILS # BLD: 0.11 K/UL (ref 0–0.12)
BILIRUB SERPL-MCNC: 0.5 MG/DL (ref 0.1–1.5)
BUN SERPL-MCNC: 18 MG/DL (ref 8–22)
CALCIUM SERPL-MCNC: 9.3 MG/DL (ref 8.5–10.5)
CHLORIDE SERPL-SCNC: 99 MMOL/L (ref 96–112)
CO2 SERPL-SCNC: 27 MMOL/L (ref 20–33)
CREAT SERPL-MCNC: 1.16 MG/DL (ref 0.5–1.4)
CREAT UR-MCNC: 61.6 MG/DL
EOSINOPHIL # BLD AUTO: 0.1 K/UL (ref 0–0.51)
EOSINOPHIL NFR BLD: 1.2 % (ref 0–6.9)
ERYTHROCYTE [DISTWIDTH] IN BLOOD BY AUTOMATED COUNT: 39.8 FL (ref 35.9–50)
GLOBULIN SER CALC-MCNC: 3.6 G/DL (ref 1.9–3.5)
GLUCOSE SERPL-MCNC: 304 MG/DL (ref 65–99)
HCT VFR BLD AUTO: 44.4 % (ref 42–52)
HGB BLD-MCNC: 15.2 G/DL (ref 14–18)
IMM GRANULOCYTES # BLD AUTO: 0.02 K/UL (ref 0–0.11)
IMM GRANULOCYTES NFR BLD AUTO: 0.2 % (ref 0–0.9)
LYMPHOCYTES # BLD AUTO: 1.43 K/UL (ref 1–4.8)
LYMPHOCYTES NFR BLD: 17.2 % (ref 22–41)
MCH RBC QN AUTO: 29.2 PG (ref 27–33)
MCHC RBC AUTO-ENTMCNC: 34.2 G/DL (ref 33.7–35.3)
MCV RBC AUTO: 85.4 FL (ref 81.4–97.8)
MICROALBUMIN UR-MCNC: 63.1 MG/DL
MICROALBUMIN/CREAT UR: 1024 MG/G (ref 0–30)
MONOCYTES # BLD AUTO: 0.49 K/UL (ref 0–0.85)
MONOCYTES NFR BLD AUTO: 5.9 % (ref 0–13.4)
NEUTROPHILS # BLD AUTO: 6.18 K/UL (ref 1.82–7.42)
NEUTROPHILS NFR BLD: 74.2 % (ref 44–72)
NRBC # BLD AUTO: 0 K/UL
NRBC BLD-RTO: 0 /100 WBC
PLATELET # BLD AUTO: 252 K/UL (ref 164–446)
PMV BLD AUTO: 10.6 FL (ref 9–12.9)
POTASSIUM SERPL-SCNC: 3.6 MMOL/L (ref 3.6–5.5)
PROT SERPL-MCNC: 7.5 G/DL (ref 6–8.2)
RBC # BLD AUTO: 5.2 M/UL (ref 4.7–6.1)
SODIUM SERPL-SCNC: 136 MMOL/L (ref 135–145)
WBC # BLD AUTO: 8.3 K/UL (ref 4.8–10.8)

## 2018-08-17 PROCEDURE — 80053 COMPREHEN METABOLIC PANEL: CPT

## 2018-08-17 PROCEDURE — 82570 ASSAY OF URINE CREATININE: CPT

## 2018-08-17 PROCEDURE — 36415 COLL VENOUS BLD VENIPUNCTURE: CPT

## 2018-08-17 PROCEDURE — 85025 COMPLETE CBC W/AUTO DIFF WBC: CPT

## 2018-08-17 PROCEDURE — 82043 UR ALBUMIN QUANTITATIVE: CPT

## 2018-08-17 PROCEDURE — 99214 OFFICE O/P EST MOD 30 MIN: CPT | Performed by: FAMILY MEDICINE

## 2018-08-17 RX ORDER — LANCETS 30 GAUGE
EACH MISCELLANEOUS
Qty: 100 EACH | Refills: 1 | Status: SHIPPED | OUTPATIENT
Start: 2018-08-17 | End: 2019-04-06 | Stop reason: SDUPTHER

## 2018-08-17 ASSESSMENT — PAIN SCALES - GENERAL
PAINLEVEL: 5=MODERATE PAIN
PAINLEVEL: 5=MODERATE PAIN

## 2018-08-17 ASSESSMENT — PATIENT HEALTH QUESTIONNAIRE - PHQ9: CLINICAL INTERPRETATION OF PHQ2 SCORE: 0

## 2018-08-17 NOTE — ASSESSMENT & PLAN NOTE
Taking Dulcolax daily, still having trouble going. Says he drinks plenty of fluids. No bloating, cramping.

## 2018-08-17 NOTE — ASSESSMENT & PLAN NOTE
Could not be seen by Dr. Gabriel, not taking any new medicare patients. BS running 160-17 during day, running 200's in evening. On insulins and metformin and new Jardiance. Tolerating well.

## 2018-08-18 DIAGNOSIS — F41.9 ANXIETY: Chronic | ICD-10-CM

## 2018-08-20 DIAGNOSIS — F41.9 ANXIETY: Chronic | ICD-10-CM

## 2018-08-20 RX ORDER — PAROXETINE HYDROCHLORIDE 20 MG/1
TABLET, FILM COATED ORAL
Qty: 90 TAB | Refills: 1 | Status: SHIPPED | OUTPATIENT
Start: 2018-08-20 | End: 2019-02-25 | Stop reason: SDUPTHER

## 2018-08-20 RX ORDER — PAROXETINE HYDROCHLORIDE 20 MG/1
TABLET, FILM COATED ORAL
Refills: 1 | OUTPATIENT
Start: 2018-08-20

## 2018-08-20 RX ORDER — METFORMIN HYDROCHLORIDE 500 MG/1
TABLET, EXTENDED RELEASE ORAL
Qty: 360 TAB | Refills: 1 | Status: SHIPPED | OUTPATIENT
Start: 2018-08-20 | End: 2018-11-27 | Stop reason: SDUPTHER

## 2018-08-20 NOTE — TELEPHONE ENCOUNTER
Was the patient seen in the last year in this department? Yes    Does patient have an active prescription for medications requested? No     Received Request Via: Pharmacy     metFORMIN ER (GLUCOPHAGE XR) 500 MG TABLET SR 24 HR  TAKE 2 TABLETS BY MOUTH TWICE DAILY

## 2018-09-05 NOTE — PROGRESS NOTES
1. Attempt #:1    2. WebIZ Checked & Epic Updated: Yes  3. [facility] Verified: yes  4. Verify PCP: yes    5. Communication Preference Obtained: yes    6. Diabetes Visit Scheduling  N/A      7. Care Gap Scheduling (Attempt to Schedule EACH Overdue Care Gap!)    Health Maintenance Due   Topic Date Due   • IMM HEP B VACCINE (1 of 3 - Risk 3-dose series) 07/05/1969   • IMM ZOSTER VACCINES (2 of 3) 08/31/2015   • FASTING LIPID PROFILE  04/07/2018   • IMM INFLUENZA (1) 09/01/2018        8. Patient was directed to Health and Wellness Website: yes         9. Screened for Food [employer] Prescription? no  10. [employer] Activation: already active  11. [employer] Melquiades: no  12. Virtual Visits: no  13. Opt In to Text Messages: YES

## 2018-09-05 NOTE — PROGRESS NOTES
Outcome: Left Message    Please transfer to Patient Outreach Team at [phone] when patient returns call.    Attempt # 2

## 2018-09-06 DIAGNOSIS — I10 ESSENTIAL HYPERTENSION: Chronic | ICD-10-CM

## 2018-09-06 DIAGNOSIS — E78.2 MIXED HYPERLIPIDEMIA: ICD-10-CM

## 2018-09-06 DIAGNOSIS — E11.65 TYPE 2 DIABETES MELLITUS WITH HYPERGLYCEMIA, WITH LONG-TERM CURRENT USE OF INSULIN (HCC): ICD-10-CM

## 2018-09-06 DIAGNOSIS — Z79.4 TYPE 2 DIABETES MELLITUS WITH HYPERGLYCEMIA, WITH LONG-TERM CURRENT USE OF INSULIN (HCC): ICD-10-CM

## 2018-09-06 RX ORDER — CALCIUM CARB/VITAMIN D3/VIT K1 500-100-40
1 TABLET,CHEWABLE ORAL
Qty: 200 EACH | Refills: 5 | Status: SHIPPED | OUTPATIENT
Start: 2018-09-06 | End: 2019-09-10

## 2018-09-06 RX ORDER — HYDROCHLOROTHIAZIDE 25 MG/1
TABLET ORAL
Qty: 90 TAB | Refills: 1 | Status: SHIPPED | OUTPATIENT
Start: 2018-09-06 | End: 2019-02-25 | Stop reason: SDUPTHER

## 2018-09-06 RX ORDER — ATORVASTATIN CALCIUM 40 MG/1
TABLET, FILM COATED ORAL
Qty: 90 TAB | Refills: 1 | Status: SHIPPED | OUTPATIENT
Start: 2018-09-06 | End: 2019-05-06 | Stop reason: SDUPTHER

## 2018-09-06 NOTE — TELEPHONE ENCOUNTER
Was the patient seen in the last year in this department? Yes    Does patient have an active prescription for medications requested? Yes    Received Request Via: Patient     Last visit 8/17/2018  Last labs 8/17/2018

## 2018-11-19 RX ORDER — PREDNISONE 20 MG/1
20 TABLET ORAL DAILY
Qty: 8 TAB | Refills: 0 | OUTPATIENT
Start: 2018-11-19

## 2018-11-19 RX ORDER — PREDNISONE 20 MG/1
20 TABLET ORAL DAILY
COMMUNITY
End: 2019-03-19

## 2018-11-19 RX ORDER — CYCLOBENZAPRINE HCL 10 MG
10 TABLET ORAL 3 TIMES DAILY PRN
Qty: 30 TAB | Refills: 0 | Status: SHIPPED | OUTPATIENT
Start: 2018-11-19 | End: 2018-11-27 | Stop reason: SDUPTHER

## 2018-11-19 RX ORDER — CYCLOBENZAPRINE HCL 10 MG
10 TABLET ORAL 3 TIMES DAILY PRN
COMMUNITY
End: 2018-11-19 | Stop reason: SDUPTHER

## 2018-11-19 NOTE — TELEPHONE ENCOUNTER
Called and LM informing patient of his refill for his Flexeril had been approved and that his Prednisone had been denied and would have to be discussed at his upcoming appointment.

## 2018-11-19 NOTE — TELEPHONE ENCOUNTER
Patient has upcoming appointment on 11/27/18. These have not been prescribed by you. Patient was given these by [facility] doc. He was hoping for a refill to get him through until his appointment with you.    Was the patient seen in the last year in this department? Yes    Does patient have an active prescription for medications requested? Yes    Received Request Via: Patient        Last visit: 8/17/18  Last labs:8/17/18

## 2018-11-27 ENCOUNTER — OFFICE VISIT (OUTPATIENT)
Dept: MEDICAL GROUP | Facility: PHYSICIAN GROUP | Age: 68
End: 2018-11-27
Payer: MEDICARE

## 2018-11-27 VITALS
OXYGEN SATURATION: 96 % | BODY MASS INDEX: 31.23 KG/M2 | TEMPERATURE: 98.7 F | WEIGHT: 199 LBS | DIASTOLIC BLOOD PRESSURE: 82 MMHG | RESPIRATION RATE: 14 BRPM | HEART RATE: 96 BPM | SYSTOLIC BLOOD PRESSURE: 132 MMHG | HEIGHT: 67 IN

## 2018-11-27 DIAGNOSIS — G89.29 CHRONIC LOW BACK PAIN WITH LEFT-SIDED SCIATICA, UNSPECIFIED BACK PAIN LATERALITY: ICD-10-CM

## 2018-11-27 DIAGNOSIS — Z23 NEED FOR VACCINATION: ICD-10-CM

## 2018-11-27 DIAGNOSIS — M54.42 CHRONIC LOW BACK PAIN WITH LEFT-SIDED SCIATICA, UNSPECIFIED BACK PAIN LATERALITY: ICD-10-CM

## 2018-11-27 DIAGNOSIS — Z78.9 HISTORY OF EXCESSIVE CERUMEN: ICD-10-CM

## 2018-11-27 PROBLEM — M54.50 CHRONIC LOW BACK PAIN: Status: ACTIVE | Noted: 2018-11-27

## 2018-11-27 LAB
HBA1C MFR BLD: 10.6 % (ref ?–5.8)
INT CON NEG: NORMAL
INT CON POS: NORMAL

## 2018-11-27 PROCEDURE — 90662 IIV NO PRSV INCREASED AG IM: CPT | Performed by: FAMILY MEDICINE

## 2018-11-27 PROCEDURE — 83036 HEMOGLOBIN GLYCOSYLATED A1C: CPT | Performed by: FAMILY MEDICINE

## 2018-11-27 PROCEDURE — 99214 OFFICE O/P EST MOD 30 MIN: CPT | Mod: 25 | Performed by: FAMILY MEDICINE

## 2018-11-27 PROCEDURE — G0008 ADMIN INFLUENZA VIRUS VAC: HCPCS | Performed by: FAMILY MEDICINE

## 2018-11-27 RX ORDER — HYDROCODONE BITARTRATE AND ACETAMINOPHEN 5; 325 MG/1; MG/1
TABLET ORAL
COMMUNITY
Start: 2018-11-16 | End: 2019-03-19

## 2018-11-27 RX ORDER — CYCLOBENZAPRINE HCL 10 MG
10 TABLET ORAL 3 TIMES DAILY PRN
Qty: 30 TAB | Refills: 0 | Status: SHIPPED | OUTPATIENT
Start: 2018-11-27 | End: 2019-07-24 | Stop reason: SDUPTHER

## 2018-11-27 RX ORDER — METFORMIN HYDROCHLORIDE 500 MG/1
1000 TABLET, EXTENDED RELEASE ORAL 2 TIMES DAILY
Qty: 360 TAB | Refills: 1 | Status: SHIPPED | OUTPATIENT
Start: 2018-11-27 | End: 2019-08-16 | Stop reason: SDUPTHER

## 2018-11-27 RX ORDER — PRAMIPEXOLE DIHYDROCHLORIDE 1 MG/1
TABLET ORAL
COMMUNITY
Start: 2018-11-01 | End: 2019-03-19 | Stop reason: SDUPTHER

## 2018-11-27 RX ORDER — MELOXICAM 15 MG/1
TABLET ORAL
COMMUNITY
Start: 2018-11-20 | End: 2019-06-18 | Stop reason: SDUPTHER

## 2018-11-27 NOTE — PROGRESS NOTES
"RN-CDE Note    Subjective:     Health changes since last visit/interval Hx: None    Medications (including changes made today)  Current Outpatient Prescriptions   Medication Sig Dispense Refill   • meloxicam (MOBIC) 15 MG tablet      • HYDROcodone-acetaminophen (NORCO) 5-325 MG Tab per tablet      • pramipexole (MIRAPEX) 1 MG Tab      • cyclobenzaprine (FLEXERIL) 10 MG Tab Take 1 Tab by mouth 3 times a day as needed. 30 Tab 0   • predniSONE (DELTASONE) 20 MG Tab Take 20 mg by mouth every day.     • atorvastatin (LIPITOR) 40 MG Tab TAKE ONE TABLET BY MOUTH ONCE DAILY 90 Tab 1   • hydroCHLOROthiazide (HYDRODIURIL) 25 MG Tab TAKE ONE TABLET BY MOUTH ONCE DAILY 90 Tab 1   • Insulin Syringe-Needle U-100 (INSULIN SYRINGE .3CC/31GX5/16\") 31G X 5/16\" 0.3 ML Misc 1 Syringe by Does not apply route 5 Times a Day. 200 Each 5   • metFORMIN ER (GLUCOPHAGE XR) 500 MG TABLET SR 24 HR TAKE 2 TABLETS BY MOUTH TWICE DAILY 360 Tab 1   • PARoxetine (PAXIL) 20 MG Tab TAKE ONE TABLET BY MOUTH ONCE DAILY 90 Tab 1   • Lancets Misc Lancets order:   Accucheck Soft Clix lancets  Testing blood sugars 5 times per day for insulin adjustment. 100 Each 1   • insulin NPH (NOVOLIN N RELION) 100 UNIT/ML Suspension Inject 32 Units as instructed 2 Times a Day. 20 mL 6   • insulin regular (NOVOLIN R RELION) 100 Unit/mL Solution Inject 16 Units as instructed 3 times a day before meals. 20 mL 6   • glucose blood (ACCU-CHEK AMY PLUS) strip USE ONE STRIP TO CHECK GLUCOSE 4 TIMES DAILY **E11.65** 150 Strip 2   • Empagliflozin 10 MG Tab Take 1 Tab by mouth every day. 30 Tab 5   • RELION INSULIN SYRINGE 31G X 15/64\" 0.3 ML Misc Inject 1 Each as instructed 5 Times a Day. 150 Each 6   • Insulin Pen Needle (BD PEN NEEDLE DAVIE U/F) 32G X 4 MM Misc For insulin shots 4 times a day 150 Each 6   • Diclofenac Sodium (VOLTAREN) 1 % Gel Apply 4 g to skin as directed 3 times a day as needed. 1 Tube 0   • lidocaine (LIDODERM) 5 % Patch Apply 1 Patch to skin as directed " "every 24 hours. as needed for nerve pain. 30 Patch 2   • lisinopril (PRINIVIL, ZESTRIL) 40 MG tablet TAKE ONE TABLET BY MOUTH ONCE DAILY 90 Tab 3   • amlodipine (NORVASC) 5 MG Tab TAKE ONE TABLET BY MOUTH ONCE DAILY 90 Tab 3   • aspirin 81 MG tablet Take 81 mg by mouth every day.       No current facility-administered medications for this visit.        Taking daily ASA: No  Taking above medications as prescribed: yes  SIDE EFFECTS: Patient denies side effects to medications    Exercise: moderate regular exercise, aerobic < 3 days a week  Diet: \"unhealthy\" diet in general  Patient's body mass index is 31.17 kg/m². Exercise and nutrition counseling were performed at this visit.      Health Maintenance:   Health Maintenance Due   Topic Date Due   • IMM HEP B VACCINE (1 of 3 - Risk 3-dose series) 07[birth year]1968   • IMM ZOSTER VACCINES (2 of 3) 08/31/2015   • FASTING LIPID PROFILE  04/07/2018   • IMM INFLUENZA (1) 09/01/2018   • DIABETES MONOFILAMENT / LE EXAM  10/14/2018   • Annual Wellness Visit  10/15/2018       Immunizations:   PPSV23: Due  Klmfhcp07: Up-to-date  Tdap: unknown  Flu: Up-to-date  Hep B: Due    DM:   Last A1c:   Lab Results   Component Value Date/Time    HBA1C 10.6 11/27/2018 10:19 AM      A1C GOAL: < 7    Glucose monitoring frequency: daily    200s+  Hypoglycemic episodes: no    Last Retinal Exam: due in september  Daily Foot Exam: Yes   Routine Dental Exams: No    Lab Results   Component Value Date/Time    MALBCRT 1024 (H) 08/17/2018 11:21 AM    MICROALBUR 63.1 08/17/2018 11:21 AM        ACR Albumin/Creatinine Ratio goal <30     HTN:   Blood pressure goal <140/<80 .   Currently Rx ACE/ARB: Yes    Dyslipidemia:    Lab Results   Component Value Date/Time    CHOLSTRLTOT 152 04/07/2017 10:01 AM    LDL 78 04/07/2017 10:01 AM    HDL 44 04/07/2017 10:01 AM    TRIGLYCERIDE 149 04/07/2017 10:01 AM       Lab Results   Component Value Date/Time    SODIUM 136 08/17/2018 11:21 AM    POTASSIUM 3.6 08/17/2018 11:21 AM "    CHLORIDE 99 08/17/2018 11:21 AM    CO2 27 08/17/2018 11:21 AM    GLUCOSE 304 (H) 08/17/2018 11:21 AM    BUN 18 08/17/2018 11:21 AM    CREATININE 1.16 08/17/2018 11:21 AM     Lab Results   Component Value Date/Time    ALKPHOSPHAT 85 08/17/2018 11:21 AM    ASTSGOT 15 08/17/2018 11:21 AM    ALTSGPT 19 08/17/2018 11:21 AM    TBILIRUBIN 0.5 08/17/2018 11:21 AM        Currently Rx Statin: Yes    He  reports that he has never smoked. He has never used smokeless tobacco.    Objective:     Exam:  Monofilament: done    Plan:     Discussed and educated on:   - All medications, side effects and compliance (discussed carefully)  - Annual eye examinations at Ophthalmology  - Diabetic Meal Plan: foods that contain carbs  - Home glucose monitoring emphasized  - Weight control and daily exercise    Recommended medication changes: increase metformin to 1000 mg BID

## 2018-11-27 NOTE — ASSESSMENT & PLAN NOTE
Currently on NPH 32 u bid, 16 u regular tid and metformin 500 mg bid. Needs to see ophthalmologist.

## 2018-11-27 NOTE — ASSESSMENT & PLAN NOTE
Radiated from left lower back down back and side of left lower leg. Needs to use can to get around. Followed by Dr. Rosales, states he is not getting sufficient relief on combo meloxicam, flexeril and prednisone (started by ER doc).

## 2018-11-28 NOTE — PROGRESS NOTES
"Complaint: f/u DM     Subjective:     Carlos Pulido is a 68 y.o. male here today for f/u    Chronic low back pain  Radiated from left lower back down back and side of left lower leg. Needs to use can to get around. Followed by Dr. Rosales, states he is not getting sufficient relief on combo meloxicam, flexeril and prednisone (started by ER doc).    Uncontrolled type 2 diabetes mellitus with insulin therapy (CMS-[facility])  Currently on NPH 32 u bid, 16 u regular tid and metformin 500 mg bid. Needs to see ophthalmologist.     No other concerns or complaints today.    Current medicines (including changes today)  Current Outpatient Prescriptions   Medication Sig Dispense Refill   • meloxicam (MOBIC) 15 MG tablet      • HYDROcodone-acetaminophen (NORCO) 5-325 MG Tab per tablet      • pramipexole (MIRAPEX) 1 MG Tab      • cyclobenzaprine (FLEXERIL) 10 MG Tab Take 1 Tab by mouth 3 times a day as needed. 30 Tab 0   • metFORMIN ER (GLUCOPHAGE XR) 500 MG TABLET SR 24 HR Take 2 Tabs by mouth 2 times a day. 360 Tab 1   • predniSONE (DELTASONE) 20 MG Tab Take 20 mg by mouth every day.     • atorvastatin (LIPITOR) 40 MG Tab TAKE ONE TABLET BY MOUTH ONCE DAILY 90 Tab 1   • hydroCHLOROthiazide (HYDRODIURIL) 25 MG Tab TAKE ONE TABLET BY MOUTH ONCE DAILY 90 Tab 1   • Insulin Syringe-Needle U-100 (INSULIN SYRINGE .3CC/31GX5/16\") 31G X 5/16\" 0.3 ML Misc 1 Syringe by Does not apply route 5 Times a Day. 200 Each 5   • PARoxetine (PAXIL) 20 MG Tab TAKE ONE TABLET BY MOUTH ONCE DAILY 90 Tab 1   • Lancets Misc Lancets order:   Accucheck Soft Clix lancets  Testing blood sugars 5 times per day for insulin adjustment. 100 Each 1   • insulin NPH (NOVOLIN N RELION) 100 UNIT/ML Suspension Inject 32 Units as instructed 2 Times a Day. 20 mL 6   • insulin regular (NOVOLIN R RELION) 100 Unit/mL Solution Inject 16 Units as instructed 3 times a day before meals. 20 mL 6   • glucose blood (ACCU-CHEK AMY PLUS) strip USE ONE STRIP TO CHECK GLUCOSE 4 TIMES " "DAILY **E11.65** 150 Strip 2   • Empagliflozin 10 MG Tab Take 1 Tab by mouth every day. 30 Tab 5   • RELION INSULIN SYRINGE 31G X 15/64\" 0.3 ML Misc Inject 1 Each as instructed 5 Times a Day. 150 Each 6   • Insulin Pen Needle (BD PEN NEEDLE DAVIE U/F) 32G X 4 MM Misc For insulin shots 4 times a day 150 Each 6   • Diclofenac Sodium (VOLTAREN) 1 % Gel Apply 4 g to skin as directed 3 times a day as needed. 1 Tube 0   • lidocaine (LIDODERM) 5 % Patch Apply 1 Patch to skin as directed every 24 hours. as needed for nerve pain. 30 Patch 2   • lisinopril (PRINIVIL, ZESTRIL) 40 MG tablet TAKE ONE TABLET BY MOUTH ONCE DAILY 90 Tab 3   • amlodipine (NORVASC) 5 MG Tab TAKE ONE TABLET BY MOUTH ONCE DAILY 90 Tab 3   • aspirin 81 MG tablet Take 81 mg by mouth every day.       No current facility-administered medications for this visit.      He  has a past medical history of Anxiety (4/16/2013); Hypertension (4/16/2013); Low testosterone (4/16/2013); Meralgia paraesthetica, right (12/18/2017); PATTI (obstructive sleep apnea) (4/16/2013); and Restless legs syndrome (RLS) (4/16/2013).    Health Maintenance: needs flu shot      Allergies: Amoxicillin and Hydrocodone    Current Outpatient Prescriptions Ordered in [facility]   Medication Sig Dispense Refill   • meloxicam (MOBIC) 15 MG tablet      • HYDROcodone-acetaminophen (NORCO) 5-325 MG Tab per tablet      • pramipexole (MIRAPEX) 1 MG Tab      • cyclobenzaprine (FLEXERIL) 10 MG Tab Take 1 Tab by mouth 3 times a day as needed. 30 Tab 0   • metFORMIN ER (GLUCOPHAGE XR) 500 MG TABLET SR 24 HR Take 2 Tabs by mouth 2 times a day. 360 Tab 1   • predniSONE (DELTASONE) 20 MG Tab Take 20 mg by mouth every day.     • atorvastatin (LIPITOR) 40 MG Tab TAKE ONE TABLET BY MOUTH ONCE DAILY 90 Tab 1   • hydroCHLOROthiazide (HYDRODIURIL) 25 MG Tab TAKE ONE TABLET BY MOUTH ONCE DAILY 90 Tab 1   • Insulin Syringe-Needle U-100 (INSULIN SYRINGE .3CC/31GX5/16\") 31G X 5/16\" 0.3 ML Misc 1 Syringe by Does not apply " "route 5 Times a Day. 200 Each 5   • PARoxetine (PAXIL) 20 MG Tab TAKE ONE TABLET BY MOUTH ONCE DAILY 90 Tab 1   • Lancets Misc Lancets order:   Accucheck Soft Clix lancets  Testing blood sugars 5 times per day for insulin adjustment. 100 Each 1   • insulin NPH (NOVOLIN N RELION) 100 UNIT/ML Suspension Inject 32 Units as instructed 2 Times a Day. 20 mL 6   • insulin regular (NOVOLIN R RELION) 100 Unit/mL Solution Inject 16 Units as instructed 3 times a day before meals. 20 mL 6   • glucose blood (ACCU-CHEK AMY PLUS) strip USE ONE STRIP TO CHECK GLUCOSE 4 TIMES DAILY **E11.65** 150 Strip 2   • Empagliflozin 10 MG Tab Take 1 Tab by mouth every day. 30 Tab 5   • RELION INSULIN SYRINGE 31G X 15/64\" 0.3 ML Misc Inject 1 Each as instructed 5 Times a Day. 150 Each 6   • Insulin Pen Needle (BD PEN NEEDLE DAVIE U/F) 32G X 4 MM Misc For insulin shots 4 times a day 150 Each 6   • Diclofenac Sodium (VOLTAREN) 1 % Gel Apply 4 g to skin as directed 3 times a day as needed. 1 Tube 0   • lidocaine (LIDODERM) 5 % Patch Apply 1 Patch to skin as directed every 24 hours. as needed for nerve pain. 30 Patch 2   • lisinopril (PRINIVIL, ZESTRIL) 40 MG tablet TAKE ONE TABLET BY MOUTH ONCE DAILY 90 Tab 3   • amlodipine (NORVASC) 5 MG Tab TAKE ONE TABLET BY MOUTH ONCE DAILY 90 Tab 3   • aspirin 81 MG tablet Take 81 mg by mouth every day.       No current [facility]-ordered facility-administered medications on file.        Past Medical History:   Diagnosis Date   • Anxiety 4/16/2013    As needed for anxiety   • Hypertension 4/16/2013   • Low testosterone 4/16/2013   • Meralgia paraesthetica, right 12/18/2017   • PATTI (obstructive sleep apnea) 4/16/2013    Cannot tolerate sleep apnea   • Restless legs syndrome (RLS) 4/16/2013       Past Surgical History:   Procedure Laterality Date   • KNEE REPLACEMENT, TOTAL Right 2011    done in [facility]   • KNEE REPLACEMENT, TOTAL Left 2009    done in [facility]   • RHINOPLASTY         Social " "History   Substance Use Topics   • Smoking status: Never Smoker   • Smokeless tobacco: Never Used   • Alcohol use 0.0 oz/week      Comment: x2 drinks monthly       Social History     Social History Narrative    Retired from construction (pipe line work)       Family History   Problem Relation Age of Onset   • Diabetes Mother    • Diabetes Father    • Diabetes Sister    • Diabetes Brother    • No Known Problems Brother    • Heart Disease Neg Hx    • Heart Failure Neg Hx          ROS Positive for low back pain  Patient denies any fever, chills, unintentional weight gain/loss, fatigue, stroke symptoms, dizziness, headache, nasal congestion, sore-throat, cough, heartburn, chest pain, difficulty breathing, abdominal discomfort, diarrhea/constipation, burning with urination or frequency,  skin rashes, depression or anxiety.       Objective:     Blood pressure 132/82, pulse 96, temperature 37.1 °C (98.7 °F), resp. rate 14, height 1.702 m (5' 7\"), weight 90.3 kg (199 lb), SpO2 96 %. Body mass index is 31.17 kg/m².   Physical Exam:  Constitutional: Alert, no distress.  No formal exam today    Assessment and Plan:   The following treatment plan was discussed    1. Uncontrolled type 2 diabetes mellitus with insulin therapy (HCC)  A1c 10.6% today. To see DM nurse today. Will increase metformin, have him see her in a couple weeks again.  - metFORMIN ER (GLUCOPHAGE XR) 500 MG TABLET SR 24 HR; Take 2 Tabs by mouth 2 times a day.  Dispense: 360 Tab; Refill: 1    2. History of excessive cerumen  Recurrent problem. Wax removed from both ear canals using scoop.    3. Chronic low back pain with left-sided sciatica, unspecified back pain laterality  Chronic problem. Needs to talk to Dr. Rosales about pain control.  - cyclobenzaprine (FLEXERIL) 10 MG Tab; Take 1 Tab by mouth 3 times a day as needed.  Dispense: 30 Tab; Refill: 0    4. Need for vaccination  - INFLUENZA VACCINE, HIGH DOSE (65+ ONLY)      Followup: Return in about 3 months " (around 2/27/2019), or DM.    Please note that this dictation was created using voice recognition software. I have made every reasonable attempt to correct obvious errors, but I expect that there are errors of grammar and possibly content that I did not discover before finalizing the note.

## 2019-02-12 RX ORDER — EMPAGLIFLOZIN 10 MG/1
TABLET, FILM COATED ORAL
Qty: 90 TAB | Refills: 0 | Status: SHIPPED | OUTPATIENT
Start: 2019-02-12 | End: 2019-07-24 | Stop reason: SDUPTHER

## 2019-03-19 ENCOUNTER — OFFICE VISIT (OUTPATIENT)
Dept: MEDICAL GROUP | Facility: PHYSICIAN GROUP | Age: 69
End: 2019-03-19
Payer: MEDICARE

## 2019-03-19 VITALS
TEMPERATURE: 97.9 F | HEIGHT: 67 IN | RESPIRATION RATE: 16 BRPM | SYSTOLIC BLOOD PRESSURE: 136 MMHG | DIASTOLIC BLOOD PRESSURE: 86 MMHG | WEIGHT: 206 LBS | OXYGEN SATURATION: 95 % | HEART RATE: 87 BPM | BODY MASS INDEX: 32.33 KG/M2

## 2019-03-19 DIAGNOSIS — E11.8 TYPE 2 DIABETES MELLITUS WITH COMPLICATION, WITH LONG-TERM CURRENT USE OF INSULIN (HCC): ICD-10-CM

## 2019-03-19 DIAGNOSIS — G89.29 CHRONIC PAIN OF BOTH KNEES: ICD-10-CM

## 2019-03-19 DIAGNOSIS — Z88.9 HX OF SEASONAL ALLERGIES: ICD-10-CM

## 2019-03-19 DIAGNOSIS — M54.42 CHRONIC LOW BACK PAIN WITH LEFT-SIDED SCIATICA, UNSPECIFIED BACK PAIN LATERALITY: ICD-10-CM

## 2019-03-19 DIAGNOSIS — M25.561 CHRONIC PAIN OF BOTH KNEES: ICD-10-CM

## 2019-03-19 DIAGNOSIS — Z79.4 TYPE 2 DIABETES MELLITUS WITH COMPLICATION, WITH LONG-TERM CURRENT USE OF INSULIN (HCC): ICD-10-CM

## 2019-03-19 DIAGNOSIS — M25.562 CHRONIC PAIN OF BOTH KNEES: ICD-10-CM

## 2019-03-19 DIAGNOSIS — G89.29 CHRONIC LOW BACK PAIN WITH LEFT-SIDED SCIATICA, UNSPECIFIED BACK PAIN LATERALITY: ICD-10-CM

## 2019-03-19 DIAGNOSIS — F41.9 ANXIETY: Chronic | ICD-10-CM

## 2019-03-19 DIAGNOSIS — G25.81 RESTLESS LEGS SYNDROME (RLS): ICD-10-CM

## 2019-03-19 DIAGNOSIS — H61.23 BILATERAL IMPACTED CERUMEN: ICD-10-CM

## 2019-03-19 LAB
HBA1C MFR BLD: 8.6 % (ref ?–5.8)
INT CON NEG: NORMAL
INT CON POS: NORMAL

## 2019-03-19 PROCEDURE — 83036 HEMOGLOBIN GLYCOSYLATED A1C: CPT | Performed by: FAMILY MEDICINE

## 2019-03-19 PROCEDURE — 99999 PR NO CHARGE: CPT | Performed by: FAMILY MEDICINE

## 2019-03-19 PROCEDURE — 99214 OFFICE O/P EST MOD 30 MIN: CPT | Mod: 25 | Performed by: FAMILY MEDICINE

## 2019-03-19 RX ORDER — LIDOCAINE 50 MG/G
1 PATCH TOPICAL EVERY 24 HOURS
Qty: 30 PATCH | Refills: 2 | Status: SHIPPED | OUTPATIENT
Start: 2019-03-19 | End: 2019-12-22 | Stop reason: SDUPTHER

## 2019-03-19 RX ORDER — PRAMIPEXOLE DIHYDROCHLORIDE 0.25 MG/1
0.25 TABLET ORAL
Qty: 30 TAB | Refills: 2 | Status: SHIPPED | OUTPATIENT
Start: 2019-03-19 | End: 2019-09-10

## 2019-03-19 NOTE — ASSESSMENT & PLAN NOTE
Saw Hayde this AM. A1c improved from 10.6 to 8.6in 4 months. Getting more exercise now. Currently can't afford Jardiance due to cost  (donut hole).

## 2019-03-19 NOTE — PROGRESS NOTES
Complaint: Routine f/u     Subjective:     Carlos Pulido is a 68 y.o. male here today for f/u. Saw Hayde Florence RN, earlier today.    Chronic low back pain  Requesting Lidoderm patch refills, helps ease the pain in his lower back as well. Did not have a good experience with Dr. Rosales, transferring care to [facility].    Chronic pain of both knees  Requesting refill Lidoederm    Restless legs syndrome (RLS)  Stopped taking Mirapex, made him ill. Was on 1 mg/hs, never tried lower dose. Getting pains and cramps in legs in evenings now.    Uncontrolled type 2 diabetes mellitus with insulin therapy (CMS[facility])  Saw Hayde this AM. A1c improved from 10.6 to 8.6in 4 months. Getting more exercise now. Currently can't afford Jardiance due to cost  (donut hole).    Hx of seasonal allergies  Wanting to know what he can take for allergies this Spring.         Current medicines (including changes today)  Current Outpatient Prescriptions   Medication Sig Dispense Refill   • lidocaine (LIDODERM) 5 % Patch Apply 1 Patch to skin as directed every 24 hours. as needed for nerve pain. 30 Patch 2   • pramipexole (MIRAPEX) 0.25 MG Tab Take 1 Tab by mouth every bedtime. 30 Tab 2   • ACCU-CHEK AMY PLUS strip USE  STRIP TO CHECK GLUCOSE 4 TIMES DAILY **E11.65** 150 Strip 0   • lisinopril (PRINIVIL, ZESTRIL) 40 MG tablet TAKE ONE TABLET BY MOUTH ONCE DAILY 30 Tab 3   • amLODIPine (NORVASC) 5 MG Tab TAKE ONE TABLET BY MOUTH ONCE DAILY 30 Tab 0   • PARoxetine (PAXIL) 20 MG Tab TAKE 1 TABLET BY MOUTH ONCE DAILY 30 Tab 0   • hydroCHLOROthiazide (HYDRODIURIL) 25 MG Tab TAKE 1 TABLET BY MOUTH ONCE DAILY 30 Tab 0   • glucose blood (ACCU-CHEK AMY PLUS) strip USE  STRIP TO CHECK GLUCOSE 4 TIMES DAILY **E11.65** 150 Strip 0   • cyclobenzaprine (FLEXERIL) 10 MG Tab Take 1 Tab by mouth 3 times a day as needed. 30 Tab 0   • metFORMIN ER (GLUCOPHAGE XR) 500 MG TABLET SR 24 HR Take 2 Tabs by mouth 2 times a day. 360 Tab 1   • atorvastatin (LIPITOR) 40  "MG Tab TAKE ONE TABLET BY MOUTH ONCE DAILY 90 Tab 1   • Insulin Syringe-Needle U-100 (INSULIN SYRINGE .3CC/31GX5/16\") 31G X 5/16\" 0.3 ML Misc 1 Syringe by Does not apply route 5 Times a Day. 200 Each 5   • Lancets Misc Lancets order:   Accucheck Soft Clix lancets  Testing blood sugars 5 times per day for insulin adjustment. 100 Each 1   • insulin NPH (NOVOLIN N RELION) 100 UNIT/ML Suspension Inject 32 Units as instructed 2 Times a Day. 20 mL 6   • insulin regular (NOVOLIN R RELION) 100 Unit/mL Solution Inject 16 Units as instructed 3 times a day before meals. 20 mL 6   • RELION INSULIN SYRINGE 31G X 15/64\" 0.3 ML Misc Inject 1 Each as instructed 5 Times a Day. 150 Each 6   • Insulin Pen Needle (BD PEN NEEDLE DAVIE U/F) 32G X 4 MM Misc For insulin shots 4 times a day 150 Each 6   • aspirin 81 MG tablet Take 81 mg by mouth every day.     • JARDIANCE 10 MG Tab TAKE 1 TABLET BY MOUTH ONCE DAILY (Patient not taking: Reported on 3/19/2019) 90 Tab 0   • meloxicam (MOBIC) 15 MG tablet      • Diclofenac Sodium (VOLTAREN) 1 % Gel Apply 4 g to skin as directed 3 times a day as needed. 1 Tube 0     No current facility-administered medications for this visit.      He  has a past medical history of Anxiety (4/16/2013); Hypertension (4/16/2013); Low testosterone (4/16/2013); Meralgia paraesthetica, right (12/18/2017); PATTI (obstructive sleep apnea) (4/16/2013); and Restless legs syndrome (RLS) (4/16/2013).    Health Maintenance: needs eye exam and FLP      Allergies: Amoxicillin and Hydrocodone    Current Outpatient Prescriptions Ordered in [facility]   Medication Sig Dispense Refill   • lidocaine (LIDODERM) 5 % Patch Apply 1 Patch to skin as directed every 24 hours. as needed for nerve pain. 30 Patch 2   • pramipexole (MIRAPEX) 0.25 MG Tab Take 1 Tab by mouth every bedtime. 30 Tab 2   • ACCU-CHEK AMY PLUS strip USE  STRIP TO CHECK GLUCOSE 4 TIMES DAILY **E11.65** 150 Strip 0   • lisinopril (PRINIVIL, ZESTRIL) 40 MG tablet TAKE ONE " "TABLET BY MOUTH ONCE DAILY 30 Tab 3   • amLODIPine (NORVASC) 5 MG Tab TAKE ONE TABLET BY MOUTH ONCE DAILY 30 Tab 0   • PARoxetine (PAXIL) 20 MG Tab TAKE 1 TABLET BY MOUTH ONCE DAILY 30 Tab 0   • hydroCHLOROthiazide (HYDRODIURIL) 25 MG Tab TAKE 1 TABLET BY MOUTH ONCE DAILY 30 Tab 0   • glucose blood (ACCU-CHEK AMY PLUS) strip USE  STRIP TO CHECK GLUCOSE 4 TIMES DAILY **E11.65** 150 Strip 0   • cyclobenzaprine (FLEXERIL) 10 MG Tab Take 1 Tab by mouth 3 times a day as needed. 30 Tab 0   • metFORMIN ER (GLUCOPHAGE XR) 500 MG TABLET SR 24 HR Take 2 Tabs by mouth 2 times a day. 360 Tab 1   • atorvastatin (LIPITOR) 40 MG Tab TAKE ONE TABLET BY MOUTH ONCE DAILY 90 Tab 1   • Insulin Syringe-Needle U-100 (INSULIN SYRINGE .3CC/31GX5/16\") 31G X 5/16\" 0.3 ML Misc 1 Syringe by Does not apply route 5 Times a Day. 200 Each 5   • Lancets Misc Lancets order:   Accucheck Soft Clix lancets  Testing blood sugars 5 times per day for insulin adjustment. 100 Each 1   • insulin NPH (NOVOLIN N RELION) 100 UNIT/ML Suspension Inject 32 Units as instructed 2 Times a Day. 20 mL 6   • insulin regular (NOVOLIN R RELION) 100 Unit/mL Solution Inject 16 Units as instructed 3 times a day before meals. 20 mL 6   • RELION INSULIN SYRINGE 31G X 15/64\" 0.3 ML Misc Inject 1 Each as instructed 5 Times a Day. 150 Each 6   • Insulin Pen Needle (BD PEN NEEDLE DAVIE U/F) 32G X 4 MM Misc For insulin shots 4 times a day 150 Each 6   • aspirin 81 MG tablet Take 81 mg by mouth every day.     • JARDIANCE 10 MG Tab TAKE 1 TABLET BY MOUTH ONCE DAILY (Patient not taking: Reported on 3/19/2019) 90 Tab 0   • meloxicam (MOBIC) 15 MG tablet      • Diclofenac Sodium (VOLTAREN) 1 % Gel Apply 4 g to skin as directed 3 times a day as needed. 1 Tube 0     No current [facility]-ordered facility-administered medications on file.        Past Medical History:   Diagnosis Date   • Anxiety 4/16/2013    As needed for anxiety   • Hypertension 4/16/2013   • Low testosterone 4/16/2013   • " "Meralgia paraesthetica, right 12/18/2017   • PATTI (obstructive sleep apnea) 4/16/2013    Cannot tolerate sleep apnea   • Restless legs syndrome (RLS) 4/16/2013       Past Surgical History:   Procedure Laterality Date   • KNEE REPLACEMENT, TOTAL Right 2011    done in [facility]   • KNEE REPLACEMENT, TOTAL Left 2009    done in [facility]   • RHINOPLASTY         Social History   Substance Use Topics   • Smoking status: Never Smoker   • Smokeless tobacco: Never Used   • Alcohol use 0.0 oz/week      Comment: x2 drinks monthly       Social History     Social History Narrative    Retired from construction (pipe line work)       Family History   Problem Relation Age of Onset   • Diabetes Mother    • Diabetes Father    • Diabetes Sister    • Diabetes Brother    • No Known Problems Brother    • Heart Disease Neg Hx    • Heart Failure Neg Hx          ROS Positive for pain in knees, lower back.  Patient denies any fever, chills, unintentional weight gain/loss, fatigue, stroke symptoms, dizziness, headache, nasal congestion, sore-throat, cough, heartburn, chest pain, difficulty breathing, abdominal discomfort, diarrhea/constipation, burning with urination or frequency,  skin rashes, depression or anxiety.       Objective:     Blood pressure 136/86, pulse 87, temperature 36.6 °C (97.9 °F), temperature source Temporal, resp. rate 16, height 1.702 m (5' 7\"), weight 93.4 kg (206 lb), SpO2 95 %. Body mass index is 32.26 kg/m².   Physical Exam:  Constitutional: Alert, no distress. Ambulating with cane.  Skin: Warm, dry, good turgor, no rashes in visible areas.  Eye: Equal, round and reactive, conjunctiva clear, lids normal.  ENMT: Lips without lesions, good dentition, oropharynx clear. Wax occluding both ear canals  Neck: Trachea midline, no masses, no thyromegaly. No cervical or supraclavicular lymphadenopathy  Respiratory: Unlabored respiratory effort, lungs clear to auscultation, no wheezes, no " krys.  Cardiovascular: Normal S1, S2, no murmur, no extremity edema.  Psych: Alert and oriented x3, appropriate affect and mood.        Assessment and Plan:   The following treatment plan was discussed    1. Type 2 diabetes mellitus with complication, with long-term current use of insulin (HCC)  Uncontrolled.. Endo to provide samples. Goal get patient off insulin.  - Diabetic Monofilament LE Exam (done by RN)  - REFERRAL TO ENDOCRINOLOGY    2. Restless legs syndrome (RLS)  Uncontrolled. Trial low dose Mirapex.  - pramipexole (MIRAPEX) 0.25 MG Tab; Take 1 Tab by mouth every bedtime.  Dispense: 30 Tab; Refill: 2    3. Hx of seasonal allergies  Recommend otc Zyrtec or Claritin.    4. Chronic pain of both knees  Uncontrolled. To f/u with [facility]  - lidocaine (LIDODERM) 5 % Patch; Apply 1 Patch to skin as directed every 24 hours. as needed for nerve pain.  Dispense: 30 Patch; Refill: 2    5. Chronic low back pain with left-sided sciatica, unspecified back pain laterality  Uncontrolled. To f/u with AJIT.  - lidocaine (LIDODERM) 5 % Patch; Apply 1 Patch to skin as directed every 24 hours. as needed for nerve pain.  Dispense: 30 Patch; Refill: 2    6. Anxiety  Controlled on Paxil.      Followup: Return in about 3 months (around 6/19/2019).    Please note that this dictation was created using voice recognition software. I have made every reasonable attempt to correct obvious errors, but I expect that there are errors of grammar and possibly content that I did not discover before finalizing the note.

## 2019-03-19 NOTE — ASSESSMENT & PLAN NOTE
Requesting Lidoderm patch refills, helps ease the pain in his lower back as well. Did not have a good experience with Dr. Rosales, transferring care to [facility].

## 2019-03-19 NOTE — ASSESSMENT & PLAN NOTE
Stopped taking Mirapex, made him ill. Was on 1 mg/hs, never tried lower dose. Getting pains and cramps in legs in evenings now.

## 2019-03-19 NOTE — PROGRESS NOTES
"RN-CDE Note    Subjective:     Health changes since last visit/interval Hx: None    Medications (including changes made today)  Current Outpatient Prescriptions   Medication Sig Dispense Refill   • ACCU-CHEK AMY PLUS strip USE  STRIP TO CHECK GLUCOSE 4 TIMES DAILY **E11.65** 150 Strip 0   • lisinopril (PRINIVIL, ZESTRIL) 40 MG tablet TAKE ONE TABLET BY MOUTH ONCE DAILY 30 Tab 3   • amLODIPine (NORVASC) 5 MG Tab TAKE ONE TABLET BY MOUTH ONCE DAILY 30 Tab 0   • PARoxetine (PAXIL) 20 MG Tab TAKE 1 TABLET BY MOUTH ONCE DAILY 30 Tab 0   • hydroCHLOROthiazide (HYDRODIURIL) 25 MG Tab TAKE 1 TABLET BY MOUTH ONCE DAILY 30 Tab 0   • glucose blood (ACCU-CHEK AMY PLUS) strip USE  STRIP TO CHECK GLUCOSE 4 TIMES DAILY **E11.65** 150 Strip 0   • cyclobenzaprine (FLEXERIL) 10 MG Tab Take 1 Tab by mouth 3 times a day as needed. 30 Tab 0   • metFORMIN ER (GLUCOPHAGE XR) 500 MG TABLET SR 24 HR Take 2 Tabs by mouth 2 times a day. 360 Tab 1   • atorvastatin (LIPITOR) 40 MG Tab TAKE ONE TABLET BY MOUTH ONCE DAILY 90 Tab 1   • Insulin Syringe-Needle U-100 (INSULIN SYRINGE .3CC/31GX5/16\") 31G X 5/16\" 0.3 ML Misc 1 Syringe by Does not apply route 5 Times a Day. 200 Each 5   • Lancets Misc Lancets order:   Accucheck Soft Clix lancets  Testing blood sugars 5 times per day for insulin adjustment. 100 Each 1   • insulin NPH (NOVOLIN N RELION) 100 UNIT/ML Suspension Inject 32 Units as instructed 2 Times a Day. 20 mL 6   • insulin regular (NOVOLIN R RELION) 100 Unit/mL Solution Inject 16 Units as instructed 3 times a day before meals. 20 mL 6   • RELION INSULIN SYRINGE 31G X 15/64\" 0.3 ML Misc Inject 1 Each as instructed 5 Times a Day. 150 Each 6   • Insulin Pen Needle (BD PEN NEEDLE DAVIE U/F) 32G X 4 MM Misc For insulin shots 4 times a day 150 Each 6   • lidocaine (LIDODERM) 5 % Patch Apply 1 Patch to skin as directed every 24 hours. as needed for nerve pain. 30 Patch 2   • aspirin 81 MG tablet Take 81 mg by mouth every day.     • JARDIANCE " 10 MG Tab TAKE 1 TABLET BY MOUTH ONCE DAILY (Patient not taking: Reported on 3/19/2019) 90 Tab 0   • meloxicam (MOBIC) 15 MG tablet      • pramipexole (MIRAPEX) 1 MG Tab      • Diclofenac Sodium (VOLTAREN) 1 % Gel Apply 4 g to skin as directed 3 times a day as needed. 1 Tube 0     No current facility-administered medications for this visit.        Taking daily ASA: Yes  Taking above medications as prescribed: yes  SIDE EFFECTS: Patient denies side effects to medications    Exercise: minimal exercise, one hour walking weekly  Diet: carb heavy  Patient's body mass index is 32.26 kg/m². Exercise and nutrition counseling were performed at this visit.      Health Maintenance:   Health Maintenance Due   Topic Date Due   • IMM HEP B VACCINE (1 of 3 - Risk 3-dose series) 07/05/1969   • IMM ZOSTER VACCINES (2 of 3) 08/31/2015   • FASTING LIPID PROFILE  04/07/2018   • DIABETES MONOFILAMENT / LE EXAM  10/14/2018   • Annual Wellness Visit  10/15/2018   • RETINAL SCREENING  01/22/2019       Immunizations:   PPSV23: Up-to-date  [id number]: Up-to-date  Tdap: Up-to-date  Flu: Up-to-date  Hep B: Due    DM:   Last A1c:   Lab Results   Component Value Date/Time    HBA1C 8.6 03/19/2019 09:33 AM      A1C GOAL: < 7    Glucose monitoring frequency: daily    100s-300s  Hypoglycemic episodes: no    Last Retinal Exam: on file and up-to-date  Daily Foot Exam: Yes   Routine Dental Exams: Yes    Lab Results   Component Value Date/Time    MALBCRT 1024 (H) 08/17/2018 11:21 AM    MICROALBUR 63.1 08/17/2018 11:21 AM        ACR Albumin/Creatinine Ratio goal <30     HTN:   Blood pressure goal <140/<80 .   Currently Rx ACE/ARB: Yes    Dyslipidemia:    Lab Results   Component Value Date/Time    CHOLSTRLTOT 152 04/07/2017 10:01 AM    LDL 78 04/07/2017 10:01 AM    HDL 44 04/07/2017 10:01 AM    TRIGLYCERIDE 149 04/07/2017 10:01 AM       Lab Results   Component Value Date/Time    SODIUM 136 08/17/2018 11:21 AM    POTASSIUM 3.6 08/17/2018 11:21 AM     CHLORIDE 99 08/17/2018 11:21 AM    CO2 27 08/17/2018 11:21 AM    GLUCOSE 304 (H) 08/17/2018 11:21 AM    BUN 18 08/17/2018 11:21 AM    CREATININE 1.16 08/17/2018 11:21 AM     Lab Results   Component Value Date/Time    ALKPHOSPHAT 85 08/17/2018 11:21 AM    ASTSGOT 15 08/17/2018 11:21 AM    ALTSGPT 19 08/17/2018 11:21 AM    TBILIRUBIN 0.5 08/17/2018 11:21 AM        Currently Rx Statin: Yes    He  reports that he has never smoked. He has never used smokeless tobacco.    Objective:     Exam:  Monofilament: done   Monofilament testing with a 10 gram force: sensation intact: intact bilaterally  Visual Inspection: Feet without maceration, ulcers, fissures.  Pedal pulses: intact bilaterally    Plan:     Discussed and educated on:   - All medications, side effects and compliance (discussed carefully)  - Annual eye examinations at Ophthalmology  - Diabetic Meal Plan: foods that contain carbs  - Home glucose monitoring emphasized  - Weight control and daily exercise    Recommended medication changes: work on diet

## 2019-04-12 RX ORDER — BLOOD SUGAR DIAGNOSTIC
STRIP MISCELLANEOUS
Qty: 150 STRIP | Refills: 0 | Status: SHIPPED | OUTPATIENT
Start: 2019-04-12 | End: 2019-07-08 | Stop reason: SDUPTHER

## 2019-04-15 ENCOUNTER — TELEPHONE (OUTPATIENT)
Dept: MEDICAL GROUP | Facility: PHYSICIAN GROUP | Age: 69
End: 2019-04-15

## 2019-04-15 DIAGNOSIS — Z79.4 TYPE 2 DIABETES MELLITUS WITH HYPERGLYCEMIA, WITH LONG-TERM CURRENT USE OF INSULIN (HCC): ICD-10-CM

## 2019-04-15 DIAGNOSIS — E11.65 TYPE 2 DIABETES MELLITUS WITH HYPERGLYCEMIA, WITH LONG-TERM CURRENT USE OF INSULIN (HCC): ICD-10-CM

## 2019-04-15 RX ORDER — SYRINGE AND NEEDLE,INSULIN,1ML 31GX15/64"
1 SYRINGE, EMPTY DISPOSABLE MISCELLANEOUS
Qty: 150 EACH | Refills: 6 | Status: CANCELLED | OUTPATIENT
Start: 2019-04-15

## 2019-04-15 RX ORDER — LANCETS
EACH MISCELLANEOUS
Qty: 100 EACH | Refills: 1 | Status: CANCELLED | OUTPATIENT
Start: 2019-04-15

## 2019-04-15 NOTE — TELEPHONE ENCOUNTER
"Patient came in needing refill on medication below    insulin regular (NOVOLIN R RELION) 100 Unit/mL Solution    insulin NPH (NOVOLIN N RELION) 100 UNIT/ML Suspension    RELION INSULIN SYRINGE 31G X 15/64\" 0.3 ML Misc    ACCU-CHEK SOFTCLIX LANCETS Misc  "

## 2019-04-16 DIAGNOSIS — E11.65 TYPE 2 DIABETES MELLITUS WITH HYPERGLYCEMIA, WITH LONG-TERM CURRENT USE OF INSULIN (HCC): ICD-10-CM

## 2019-04-16 DIAGNOSIS — Z79.4 TYPE 2 DIABETES MELLITUS WITH HYPERGLYCEMIA, WITH LONG-TERM CURRENT USE OF INSULIN (HCC): ICD-10-CM

## 2019-04-16 RX ORDER — LANCETS
EACH MISCELLANEOUS
Qty: 500 EACH | Refills: 1 | Status: SHIPPED | OUTPATIENT
Start: 2019-04-16 | End: 2019-09-10 | Stop reason: SDUPTHER

## 2019-04-16 RX ORDER — SYRINGE AND NEEDLE,INSULIN,1ML 31GX15/64"
1 SYRINGE, EMPTY DISPOSABLE MISCELLANEOUS
Qty: 150 EACH | Refills: 6 | Status: SHIPPED | OUTPATIENT
Start: 2019-04-16 | End: 2019-09-10 | Stop reason: SDUPTHER

## 2019-05-06 DIAGNOSIS — F41.9 ANXIETY: Chronic | ICD-10-CM

## 2019-05-06 DIAGNOSIS — E78.2 MIXED HYPERLIPIDEMIA: ICD-10-CM

## 2019-05-06 DIAGNOSIS — I10 ESSENTIAL HYPERTENSION: Chronic | ICD-10-CM

## 2019-05-06 RX ORDER — PAROXETINE HYDROCHLORIDE 20 MG/1
20 TABLET, FILM COATED ORAL DAILY
Qty: 90 TAB | Refills: 1 | Status: SHIPPED | OUTPATIENT
Start: 2019-05-06 | End: 2019-11-29 | Stop reason: SDUPTHER

## 2019-05-06 RX ORDER — AMLODIPINE BESYLATE 5 MG/1
5 TABLET ORAL DAILY
Qty: 90 TAB | Refills: 1 | Status: SHIPPED | OUTPATIENT
Start: 2019-05-06 | End: 2019-11-20 | Stop reason: SDUPTHER

## 2019-05-06 RX ORDER — ATORVASTATIN CALCIUM 40 MG/1
TABLET, FILM COATED ORAL
Qty: 90 TAB | Refills: 1 | Status: SHIPPED | OUTPATIENT
Start: 2019-05-06 | End: 2019-11-20 | Stop reason: SDUPTHER

## 2019-06-18 ENCOUNTER — OFFICE VISIT (OUTPATIENT)
Dept: MEDICAL GROUP | Facility: PHYSICIAN GROUP | Age: 69
End: 2019-06-18
Payer: MEDICARE

## 2019-06-18 VITALS
HEART RATE: 92 BPM | WEIGHT: 206 LBS | RESPIRATION RATE: 14 BRPM | TEMPERATURE: 98.5 F | DIASTOLIC BLOOD PRESSURE: 90 MMHG | HEIGHT: 67 IN | OXYGEN SATURATION: 94 % | SYSTOLIC BLOOD PRESSURE: 148 MMHG | BODY MASS INDEX: 32.33 KG/M2

## 2019-06-18 DIAGNOSIS — M25.50 CHRONIC PAIN OF MULTIPLE JOINTS: ICD-10-CM

## 2019-06-18 DIAGNOSIS — I10 ESSENTIAL HYPERTENSION: Chronic | ICD-10-CM

## 2019-06-18 DIAGNOSIS — G89.29 CHRONIC PAIN OF MULTIPLE JOINTS: ICD-10-CM

## 2019-06-18 PROBLEM — H61.23 EXCESSIVE CERUMEN IN BOTH EAR CANALS: Status: RESOLVED | Noted: 2018-07-13 | Resolved: 2019-06-18

## 2019-06-18 PROBLEM — G57.11 MERALGIA PARAESTHETICA, RIGHT: Status: RESOLVED | Noted: 2017-12-18 | Resolved: 2019-06-18

## 2019-06-18 PROCEDURE — 99214 OFFICE O/P EST MOD 30 MIN: CPT | Performed by: FAMILY MEDICINE

## 2019-06-18 RX ORDER — MELOXICAM 15 MG/1
15 TABLET ORAL DAILY
Qty: 30 TAB | Refills: 2 | Status: SHIPPED | OUTPATIENT
Start: 2019-06-18 | End: 2020-09-15 | Stop reason: SDUPTHER

## 2019-06-18 ASSESSMENT — PATIENT HEALTH QUESTIONNAIRE - PHQ9: CLINICAL INTERPRETATION OF PHQ2 SCORE: 0

## 2019-06-18 NOTE — ASSESSMENT & PLAN NOTE
Complains of pain in joints both hands, knees, feet. No swelling. Concerned about RA. No longer on meloxicam. Just taking otc meds without much relief.

## 2019-06-18 NOTE — PROGRESS NOTES
"Complaint: f/u DM     Subjective:     Carlos Pulido is a 68 y.o. male here today for f/u.     Chronic pain of multiple joints  Complains of pain in joints both hands, knees, feet. No swelling. Concerned about RA. No longer on meloxicam. Just taking otc meds without much relief.    Uncontrolled type 2 diabetes mellitus with insulin therapy (CMS-HCC)  Has not been watching his diet. Currently on Jardiance, metformin and Novolin. BS all over the place. Has not had his eyes checked this year.     Reports that his toenails are dark and thick, has been applying otc lacquer without much improvement. Sees podiaitrist.    Current medicines (including changes today)  Current Outpatient Prescriptions   Medication Sig Dispense Refill   • meloxicam (MOBIC) 15 MG tablet Take 1 Tab by mouth every day. 30 Tab 2   • ACCU-CHEK AMY PLUS strip USE 1 STRIP TO CHECK GLUCOSE 4 TIMES DAILY **E11.65** 150 Strip 2   • hydroCHLOROthiazide (HYDRODIURIL) 25 MG Tab Take 1 Tab by mouth every day. 90 Tab 1   • atorvastatin (LIPITOR) 40 MG Tab TAKE 1 TABLET BY MOUTH ONCE DAILY 90 Tab 1   • amLODIPine (NORVASC) 5 MG Tab Take 1 Tab by mouth every day. 90 Tab 1   • PARoxetine (PAXIL) 20 MG Tab Take 1 Tab by mouth every day. 90 Tab 1   • ACCU-CHEK AMY PLUS strip USE 1 STRIP TO CHECK GLUCOSE 4 TIMES DAILY **E11.65** 150 Strip 0   • insulin NPH (NOVOLIN N RELION) 100 UNIT/ML Suspension Inject 32 Units as instructed 2 Times a Day. 20 mL 6   • insulin regular (NOVOLIN R RELION) 100 Unit/mL Solution Inject 16 Units as instructed 3 times a day before meals. 20 mL 6   • ACCU-CHEK SOFTCLIX LANCETS Misc USE TO CHECK BLOOD SUGAR LEVELS 5 TIMES DAILY  **DX CODE E11.65** 500 Each 1   • RELION INSULIN SYRINGE 31G X 15/64\" 0.3 ML Misc Inject 1 Each as instructed 5 Times a Day. 150 Each 6   • ACCU-CHEK AMY PLUS strip USE 1 STRIP TO CHECK GLUCOSE 4 TIMES DAILY **E11.65** 150 Strip 0   • ACCU-CHEK AMY PLUS strip USE 1 STRIP TO CHECK GLUCOSE 4 TIMES DAILY " "**E11.65** 150 Strip 0   • lidocaine (LIDODERM) 5 % Patch Apply 1 Patch to skin as directed every 24 hours. as needed for nerve pain. 30 Patch 2   • pramipexole (MIRAPEX) 0.25 MG Tab Take 1 Tab by mouth every bedtime. 30 Tab 2   • lisinopril (PRINIVIL, ZESTRIL) 40 MG tablet TAKE ONE TABLET BY MOUTH ONCE DAILY 30 Tab 3   • JARDIANCE 10 MG Tab TAKE 1 TABLET BY MOUTH ONCE DAILY 90 Tab 0   • glucose blood (ACCU-CHEK AMY PLUS) strip USE  STRIP TO CHECK GLUCOSE 4 TIMES DAILY **E11.65** 150 Strip 0   • cyclobenzaprine (FLEXERIL) 10 MG Tab Take 1 Tab by mouth 3 times a day as needed. 30 Tab 0   • metFORMIN ER (GLUCOPHAGE XR) 500 MG TABLET SR 24 HR Take 2 Tabs by mouth 2 times a day. 360 Tab 1   • Insulin Syringe-Needle U-100 (INSULIN SYRINGE .3CC/31GX5/16\") 31G X 5/16\" 0.3 ML Misc 1 Syringe by Does not apply route 5 Times a Day. 200 Each 5   • Insulin Pen Needle (BD PEN NEEDLE DAVIE U/F) 32G X 4 MM Misc For insulin shots 4 times a day 150 Each 6   • Diclofenac Sodium (VOLTAREN) 1 % Gel Apply 4 g to skin as directed 3 times a day as needed. 1 Tube 0   • aspirin 81 MG tablet Take 81 mg by mouth every day.       No current facility-administered medications for this visit.      He  has a past medical history of Anxiety (4/16/2013); Hypertension (4/16/2013); Low testosterone (4/16/2013); Meralgia paraesthetica, right (12/18/2017); PATTI (obstructive sleep apnea) (4/16/2013); and Restless legs syndrome (RLS) (4/16/2013).    Health Maintenance:       Allergies: Amoxicillin and Hydrocodone    Current Outpatient Prescriptions Ordered in [facility]   Medication Sig Dispense Refill   • meloxicam (MOBIC) 15 MG tablet Take 1 Tab by mouth every day. 30 Tab 2   • ACCU-CHEK AMY PLUS strip USE 1 STRIP TO CHECK GLUCOSE 4 TIMES DAILY **E11.65** 150 Strip 2   • hydroCHLOROthiazide (HYDRODIURIL) 25 MG Tab Take 1 Tab by mouth every day. 90 Tab 1   • atorvastatin (LIPITOR) 40 MG Tab TAKE 1 TABLET BY MOUTH ONCE DAILY 90 Tab 1   • amLODIPine (NORVASC) 5 " "MG Tab Take 1 Tab by mouth every day. 90 Tab 1   • PARoxetine (PAXIL) 20 MG Tab Take 1 Tab by mouth every day. 90 Tab 1   • ACCU-CHEK AMY PLUS strip USE 1 STRIP TO CHECK GLUCOSE 4 TIMES DAILY **E11.65** 150 Strip 0   • insulin NPH (NOVOLIN N RELION) 100 UNIT/ML Suspension Inject 32 Units as instructed 2 Times a Day. 20 mL 6   • insulin regular (NOVOLIN R RELION) 100 Unit/mL Solution Inject 16 Units as instructed 3 times a day before meals. 20 mL 6   • ACCU-CHEK SOFTCLIX LANCETS Misc USE TO CHECK BLOOD SUGAR LEVELS 5 TIMES DAILY  **DX CODE E11.65** 500 Each 1   • RELION INSULIN SYRINGE 31G X 15/64\" 0.3 ML Misc Inject 1 Each as instructed 5 Times a Day. 150 Each 6   • ACCU-CHEK AMY PLUS strip USE 1 STRIP TO CHECK GLUCOSE 4 TIMES DAILY **E11.65** 150 Strip 0   • ACCU-CHEK AMY PLUS strip USE 1 STRIP TO CHECK GLUCOSE 4 TIMES DAILY **E11.65** 150 Strip 0   • lidocaine (LIDODERM) 5 % Patch Apply 1 Patch to skin as directed every 24 hours. as needed for nerve pain. 30 Patch 2   • pramipexole (MIRAPEX) 0.25 MG Tab Take 1 Tab by mouth every bedtime. 30 Tab 2   • lisinopril (PRINIVIL, ZESTRIL) 40 MG tablet TAKE ONE TABLET BY MOUTH ONCE DAILY 30 Tab 3   • JARDIANCE 10 MG Tab TAKE 1 TABLET BY MOUTH ONCE DAILY 90 Tab 0   • glucose blood (ACCU-CHEK AMY PLUS) strip USE  STRIP TO CHECK GLUCOSE 4 TIMES DAILY **E11.65** 150 Strip 0   • cyclobenzaprine (FLEXERIL) 10 MG Tab Take 1 Tab by mouth 3 times a day as needed. 30 Tab 0   • metFORMIN ER (GLUCOPHAGE XR) 500 MG TABLET SR 24 HR Take 2 Tabs by mouth 2 times a day. 360 Tab 1   • Insulin Syringe-Needle U-100 (INSULIN SYRINGE .3CC/31GX5/16\") 31G X 5/16\" 0.3 ML Misc 1 Syringe by Does not apply route 5 Times a Day. 200 Each 5   • Insulin Pen Needle (BD PEN NEEDLE DAVIE U/F) 32G X 4 MM Misc For insulin shots 4 times a day 150 Each 6   • Diclofenac Sodium (VOLTAREN) 1 % Gel Apply 4 g to skin as directed 3 times a day as needed. 1 Tube 0   • aspirin 81 MG tablet Take 81 mg by mouth " "every day.       No current [facility]-ordered facility-administered medications on file.        Past Medical History:   Diagnosis Date   • Anxiety 4/16/2013    As needed for anxiety   • Hypertension 4/16/2013   • Low testosterone 4/16/2013   • Meralgia paraesthetica, right 12/18/2017   • PATTI (obstructive sleep apnea) 4/16/2013    Cannot tolerate sleep apnea   • Restless legs syndrome (RLS) 4/16/2013       Past Surgical History:   Procedure Laterality Date   • KNEE REPLACEMENT, TOTAL Right 2011    done in [facility]   • KNEE REPLACEMENT, TOTAL Left 2009    done in [facility]   • RHINOPLASTY         Social History   Substance Use Topics   • Smoking status: Never Smoker   • Smokeless tobacco: Never Used   • Alcohol use 0.0 oz/week      Comment: x2 drinks monthly       Social History     Social History Narrative    Retired from construction (pipe line work)       Family History   Problem Relation Age of Onset   • Diabetes Mother    • Diabetes Father    • Diabetes Sister    • Diabetes Brother    • No Known Problems Brother    • Heart Disease Neg Hx    • Heart Failure Neg Hx          ROS    Patient denies any fever, chills, unintentional weight gain/loss, fatigue, stroke symptoms, dizziness, headache, nasal congestion, sore-throat, cough, heartburn, chest pain, difficulty breathing, abdominal discomfort, diarrhea/constipation, burning with urination or frequency, joint or back pain, skin rashes, depression or anxiety.       Objective:     /90 (BP Location: Left arm, Patient Position: Sitting, BP Cuff Size: Large adult)   Pulse 92   Temp 36.9 °C (98.5 °F) (Temporal)   Resp 14   Ht 1.702 m (5' 7\")   Wt 93.4 kg (206 lb)   SpO2 94%  Body mass index is 32.26 kg/m².   Physical Exam:  Constitutional: Alert, no distress.  Skin: Warm, dry, good turgor, no rashes in visible areas. Thickened, discolored toenails both feet.  Psych: Alert and oriented x3, appropriate affect and mood.        Assessment and " Plan:   The following treatment plan was discussed    1. Uncontrolled type 2 diabetes mellitus with insulin therapy (HCC)  Will see Hayde Naman in 2 weeks. No change meds in meantime. Needs to get his eyes checked.  - CBC WITH DIFFERENTIAL; Future  - Comp Metabolic Panel; Future  - HEMOGLOBIN A1C; Future  - Lipid Profile; Future    2. Chronic pain of multiple joints  Uncontrolled. R/o RA. Will notify with results. Make need XR hands.  - CRP QUANTITIVE (NON-CARDIAC); Future  - RHEUMATOID ARTHRITIS FACTOR; Future  - meloxicam (MOBIC) 15 MG tablet; Take 1 Tab by mouth every day.  Dispense: 30 Tab; Refill: 2    3. Essential hypertension  Uncontrolled. May need to increase amlodipine to 10 mg. Continue HCTZ, lisinopril.      Followup: Return in about 2 weeks (around 7/2/2019).    Please note that this dictation was created using voice recognition software. I have made every reasonable attempt to correct obvious errors, but I expect that there are errors of grammar and possibly content that I did not discover before finalizing the note.

## 2019-06-18 NOTE — ASSESSMENT & PLAN NOTE
Has not been watching his diet. Currently on Jardiance, metformin and Novolin. BS all over the place. Has not had his eyes checked this year.

## 2019-07-08 RX ORDER — BLOOD SUGAR DIAGNOSTIC
STRIP MISCELLANEOUS
Qty: 150 STRIP | Refills: 0 | Status: SHIPPED | OUTPATIENT
Start: 2019-07-08 | End: 2020-03-13

## 2019-07-24 DIAGNOSIS — G89.29 CHRONIC LOW BACK PAIN WITH LEFT-SIDED SCIATICA, UNSPECIFIED BACK PAIN LATERALITY: ICD-10-CM

## 2019-07-24 DIAGNOSIS — M54.42 CHRONIC LOW BACK PAIN WITH LEFT-SIDED SCIATICA, UNSPECIFIED BACK PAIN LATERALITY: ICD-10-CM

## 2019-07-24 RX ORDER — CYCLOBENZAPRINE HCL 10 MG
10 TABLET ORAL 3 TIMES DAILY PRN
Qty: 30 TAB | Refills: 0 | Status: SHIPPED | OUTPATIENT
Start: 2019-07-24 | End: 2019-09-10

## 2019-07-24 NOTE — TELEPHONE ENCOUNTER
From: Carlos Pulido  Sent: 7/24/2019 9:02 AM PDT  Subject: Medication Renewal Request    Carlos Tess would like a refill of the following medications:     cyclobenzaprine (FLEXERIL) 10 MG Tab [Sharath Pantoja M.D.]     JARDIANCE 10 MG Tab [Sharath Pantoja M.D.]     ACCU-CHEK AMY PLUS strip [Sharath Pantoja M.D.]    Preferred pharmacy: [facility] [address] [place] [address]        Medication renewals requested in this message routed separately:     Diclofenac Sodium (VOLTAREN) 1 % Gel [Nima Reyes M.D.]

## 2019-07-29 DIAGNOSIS — Z79.4 TYPE 2 DIABETES MELLITUS WITH HYPERGLYCEMIA, WITH LONG-TERM CURRENT USE OF INSULIN (HCC): ICD-10-CM

## 2019-07-29 DIAGNOSIS — E11.65 TYPE 2 DIABETES MELLITUS WITH HYPERGLYCEMIA, WITH LONG-TERM CURRENT USE OF INSULIN (HCC): ICD-10-CM

## 2019-07-29 NOTE — TELEPHONE ENCOUNTER
VOICEMAIL  1. Caller Name: Carlos        Call Back Number: [phone]    2. Message: patient called to request refill on both of his Novolin N and R called into  in [place], [facility] he has been out for 2 days at the time he left the  on 7-26-19    3. Patient approves office to leave a detailed voicemail/MyChart message: N\A

## 2019-08-14 DIAGNOSIS — E11.65 TYPE 2 DIABETES MELLITUS WITH HYPERGLYCEMIA, WITH LONG-TERM CURRENT USE OF INSULIN (HCC): ICD-10-CM

## 2019-08-14 DIAGNOSIS — Z79.4 TYPE 2 DIABETES MELLITUS WITH HYPERGLYCEMIA, WITH LONG-TERM CURRENT USE OF INSULIN (HCC): ICD-10-CM

## 2019-08-14 DIAGNOSIS — I10 ESSENTIAL HYPERTENSION: Chronic | ICD-10-CM

## 2019-08-14 RX ORDER — HUMAN INSULIN 100 [IU]/ML
INJECTION, SUSPENSION SUBCUTANEOUS
Qty: 20 ML | Refills: 6 | Status: SHIPPED | OUTPATIENT
Start: 2019-08-14 | End: 2020-03-13

## 2019-08-14 RX ORDER — LISINOPRIL 40 MG/1
TABLET ORAL
Qty: 90 TAB | Refills: 1 | Status: SHIPPED | OUTPATIENT
Start: 2019-08-14 | End: 2019-12-22 | Stop reason: SDUPTHER

## 2019-08-22 ENCOUNTER — OFFICE VISIT (OUTPATIENT)
Dept: ENDOCRINOLOGY | Facility: MEDICAL CENTER | Age: 69
End: 2019-08-22
Payer: MEDICARE

## 2019-08-22 VITALS
DIASTOLIC BLOOD PRESSURE: 80 MMHG | HEIGHT: 67 IN | OXYGEN SATURATION: 99 % | WEIGHT: 205 LBS | HEART RATE: 99 BPM | BODY MASS INDEX: 32.18 KG/M2 | SYSTOLIC BLOOD PRESSURE: 114 MMHG

## 2019-08-22 DIAGNOSIS — I10 HYPERTENSION, UNSPECIFIED TYPE: ICD-10-CM

## 2019-08-22 DIAGNOSIS — E78.5 HYPERLIPIDEMIA, UNSPECIFIED HYPERLIPIDEMIA TYPE: ICD-10-CM

## 2019-08-22 LAB
HBA1C MFR BLD: 9.7 % (ref 0–5.6)
INT CON NEG: NEGATIVE
INT CON POS: POSITIVE

## 2019-08-22 PROCEDURE — 83036 HEMOGLOBIN GLYCOSYLATED A1C: CPT | Performed by: PHYSICIAN ASSISTANT

## 2019-08-22 PROCEDURE — 99214 OFFICE O/P EST MOD 30 MIN: CPT | Performed by: PHYSICIAN ASSISTANT

## 2019-08-22 NOTE — PATIENT INSTRUCTIONS
Jardiance 25 mg in the morning   Tresiba 20 units at night   Ozempic 0.25 mg/weekly for 2 weeks then increase to 0.5mg /week due for 2-3 weeks   Novolog:   Sliding scale:   Start a 1:50 above 150  150-200 1 units  201 -250 2 units  251 -300 3 units  301 - 350 4 units  351 - 400 5 units  401 - 450 6 units  451 - 500 7 units  501 - 550 8 units  600 - 650 9 units    Stop  NPH 32 units BID   R 10-16 units/meal

## 2019-08-22 NOTE — PROGRESS NOTES
"Endocrinology Clinic Progress Note    CC: Type 2 diabetes    HPI:  Carlos Pulido is a 67 y.o. old patient who comes in today for routine follow up.     Type 2 diabetes:   He is currently on   NPH 32 units twice a day    regular insulin 10-16 units 3 times a day with meals.   Metformin 1000mg BID    He checks blood sugars 4-6 times a day.     BG ranging 180-200    Prevention:   Eye: overdue with Dr. Bueno   Podiatry: 3/19  DM education:     8/22/19- A1c 9.7,   3/19/19- 8.6     Hypertension: Blood pressure 114/80    Hyperlipidemia: He is currently on Lipitor, tolerated well.    ROS:  Constitutional: No unintentional weight loss  Cardiovascular: no cp, cazares,pnd  Respiratory: no sob, no wheezing   Endo: Denies excessive thirst or frequent urination    PMH:  Patient Active Problem List   Diagnosis   • Hypertension   • Restless legs syndrome (RLS)   • PATTI (obstructive sleep apnea)   • Low testosterone   • Anxiety   • Chronic insomnia   • Obesity (BMI 30.0-34.9)   • Uncontrolled type 2 diabetes mellitus with insulin therapy (HCC)   • Non compliance w medication regimen   • Chronic pain of both knees   • Constipation   • Chronic low back pain   • Hx of seasonal allergies   • Chronic pain of multiple joints       EXAM:  Vital signs: /80 (BP Location: Left arm, Patient Position: Sitting, BP Cuff Size: Adult)   Pulse 99   Ht 1.702 m (5' 7\")   Wt 93 kg (205 lb)   SpO2 99%   BMI 32.11 kg/m²   General: No apparent distress, cooperative  Eyes: No scleral icterus, no discharge  Neck: Normal on external inspection  Resp: Normal effort  Musculoskeletal: Normal gait  Extremities: No lower extremity edema  Skin: No rash on visible skin  Psych: Alert and oriented, normal mood and affect    Assessment and Plan:    1. Type 2 diabetes mellitus with hyperglycemia, with long-term current use of insulin (CMS-[facility])  Jardiance 25 mg in the morning   Tresiba 20 units at night   Ozempic 0.25 mg/weekly for 2 weeks then increase to " 0.5mg /week due for 2-3 weeks   Novolog:   Sliding scale:   Start a 1:50 above 150  150-200 1 units  201 -250 2 units  251 -300 3 units  301 - 350 4 units  351 - 400 5 units  401 - 450 6 units  451 - 500 7 units  501 - 550 8 units  600 - 650 9 units    Stop  NPH 32 units BID   R 10-16 units/meal     2. Mixed hyperlipidemia  · Continue Lipitor    3. Essential hypertension  · Continue ACE  · Stable 114/80        Return in about 4 weeks (around 9/19/2019).    Thank you for allowing me to participate in the care of this patient.    Ana Jordan P.A.-C.    CC:   Catarino Molina M.D.    This note was created using voice recognition software (Dragon). The accuracy of the dictation is limited by the abilities of the software. I have reviewed the note prior to signing, however some errors in grammar and context are still possible. If you have any questions related to this note please do not hesitate to contact our office.

## 2019-09-06 ENCOUNTER — APPOINTMENT (OUTPATIENT)
Dept: RADIOLOGY | Facility: IMAGING CENTER | Age: 69
End: 2019-09-06
Attending: NURSE PRACTITIONER
Payer: MEDICARE

## 2019-09-06 ENCOUNTER — OFFICE VISIT (OUTPATIENT)
Dept: URGENT CARE | Facility: CLINIC | Age: 69
End: 2019-09-06
Payer: MEDICARE

## 2019-09-06 VITALS
RESPIRATION RATE: 16 BRPM | DIASTOLIC BLOOD PRESSURE: 82 MMHG | HEIGHT: 67 IN | SYSTOLIC BLOOD PRESSURE: 124 MMHG | BODY MASS INDEX: 32.02 KG/M2 | TEMPERATURE: 97.6 F | HEART RATE: 84 BPM | OXYGEN SATURATION: 94 % | WEIGHT: 204 LBS

## 2019-09-06 DIAGNOSIS — M54.9 UPPER BACK PAIN: ICD-10-CM

## 2019-09-06 DIAGNOSIS — R07.89 CHEST WALL PAIN: ICD-10-CM

## 2019-09-06 PROCEDURE — 71046 X-RAY EXAM CHEST 2 VIEWS: CPT | Mod: TC | Performed by: NURSE PRACTITIONER

## 2019-09-06 PROCEDURE — 93000 ELECTROCARDIOGRAM COMPLETE: CPT | Performed by: NURSE PRACTITIONER

## 2019-09-06 PROCEDURE — 99214 OFFICE O/P EST MOD 30 MIN: CPT | Performed by: NURSE PRACTITIONER

## 2019-09-06 RX ORDER — CYCLOBENZAPRINE HCL 5 MG
5 TABLET ORAL 3 TIMES DAILY PRN
Qty: 20 TAB | Refills: 0 | Status: SHIPPED | OUTPATIENT
Start: 2019-09-06 | End: 2019-09-10

## 2019-09-06 ASSESSMENT — ENCOUNTER SYMPTOMS
CHILLS: 0
FEVER: 0
BACK PAIN: 1

## 2019-09-06 NOTE — PROGRESS NOTES
Subjective:      Carlos Pulido is a 69 y.o. male who presents with Back Pain (upper back by right shoulder blade)    Past Medical History:   Diagnosis Date   • Anxiety 4/16/2013    As needed for anxiety   • Hypertension 4/16/2013   • Low testosterone 4/16/2013   • Meralgia paraesthetica, right 12/18/2017   • PATTI (obstructive sleep apnea) 4/16/2013    Cannot tolerate sleep apnea   • Restless legs syndrome (RLS) 4/16/2013     Social History     Socioeconomic History   • Marital status:      Spouse name: Not on file   • Number of children: Not on file   • Years of education: Not on file   • Highest education level: Not on file   Occupational History   • Not on file   Social Needs   • Financial resource strain: Not on file   • Food insecurity:     Worry: Not on file     Inability: Not on file   • Transportation needs:     Medical: Not on file     Non-medical: Not on file   Tobacco Use   • Smoking status: Never Smoker   • Smokeless tobacco: Never Used   Substance and Sexual Activity   • Alcohol use: Yes     Alcohol/week: 0.0 oz     Comment: x2 drinks monthly   • Drug use: Yes     Types: Marijuana     Comment: occasional    • Sexual activity: Yes     Partners: Female     Comment:    Lifestyle   • Physical activity:     Days per week: Not on file     Minutes per session: Not on file   • Stress: Not on file   Relationships   • Social connections:     Talks on phone: Not on file     Gets together: Not on file     Attends Mu-ism service: Not on file     Active member of club or organization: Not on file     Attends meetings of clubs or organizations: Not on file     Relationship status: Not on file   • Intimate partner violence:     Fear of current or ex partner: Not on file     Emotionally abused: Not on file     Physically abused: Not on file     Forced sexual activity: Not on file   Other Topics Concern   •  Service No   • Blood Transfusions No   • Caffeine Concern No   • Occupational Exposure  Yes     Comment: asbestos    • Hobby Hazards No   • Sleep Concern Yes   • Stress Concern No   • Weight Concern Yes   • Special Diet Yes     Comment: less carbs   • Back Care Yes   • Exercise Yes   • Bike Helmet No     Comment: does not ride bike    • Seat Belt Yes   • Self-Exams Yes   Social History Narrative    Retired from construction (pipe line work)     Family History   Problem Relation Age of Onset   • Diabetes Mother    • Diabetes Father    • Diabetes Sister    • Diabetes Brother    • No Known Problems Brother    • Heart Disease Neg Hx    • Heart Failure Neg Hx        Allergies: Amoxicillin and Hydrocodone     Patient is a 69-year-old male who presents today with complaint of pain to the right upper back just lateral to the thoracic spine.  States he has been having intermittent pain over the last 3 weeks.  He notices it most when he lays down to go to sleep at night.  Pain is localized in acute, sharp in nature.  States pain is re-created with deep breathing.  He does not know of any acute injury, though he states he has been doing strenuous work on his home putting up a fence.  When he experiences the upper back pain, he denies any radiation of pain through to his chest, any radiation of pain to the neck shoulders or arms.  He denies feeling short of breath, and he denies feeling lightheaded.  He denies weakness or diaphoresis.          Back Pain   This is a new problem. The current episode started in the past 7 days. The problem occurs constantly. The problem is unchanged. Pain location: upper back right side. The pain does not radiate. The pain is moderate. Pertinent negatives include no fever. He has tried nothing for the symptoms. The treatment provided no relief.       Review of Systems   Constitutional: Negative for chills, fever and malaise/fatigue.   Musculoskeletal: Positive for back pain.   All other systems reviewed and are negative.         Objective:     Temp 36.4 °C (97.6 °F)   Ht 1.702 m (5'  "7\")   Wt 92.5 kg (204 lb)   BMI 31.95 kg/m²      Physical Exam   Constitutional: He is oriented to person, place, and time. He appears well-developed and well-nourished.   Eyes: Pupils are equal, round, and reactive to light. EOM are normal.   Neck: Normal range of motion. Neck supple.   Cardiovascular: Normal rate, regular rhythm and normal heart sounds.   Pulmonary/Chest: Effort normal and breath sounds normal. No stridor. No respiratory distress. He has no wheezes. He has no rales. He exhibits no tenderness.   Musculoskeletal: Normal range of motion.        Arms:  Patient reports intermittent pain to the right upper back.  Pain is not present at this time.  No point tenderness.  No pain re-created with range of motion.   Neurological: He is alert and oriented to person, place, and time.   Skin: Skin is warm and dry. Capillary refill takes less than 2 seconds.   Psychiatric: He has a normal mood and affect. His behavior is normal. Judgment and thought content normal.   Vitals reviewed.    EKG:   Sinus rhythm; right BBB; no ST elevation or depression. No ischemic changes noted. No ectopy.     XR chest :      9/6/2019 9:51 AM    HISTORY/REASON FOR EXAM:  Back pain. Chest pain.      TECHNIQUE/EXAM DESCRIPTION AND NUMBER OF VIEWS:  Two views of the chest.    COMPARISON:  5/4/2016    FINDINGS:  Mild elevation right hemidiaphragm.    Bilateral lung base atelectasis.  No pneumothorax.  No pleural effusions are appreciated.    Heart is within normal limits in size.      Impression       Bilaterally base atelectasis.               Assessment/Plan:     1. Upper back pain    Ibuprofen  Flexeril 5 mg po TID as needed for pain; clearly states no driving or ETOH with this medication  Follow up with primary physician for ongoing symptoms  ER precautions for radiating chest pain, diaphoresis, nausea, light headedness, shoulder, jaw, neck, or arm pain.       "

## 2019-09-10 ENCOUNTER — OFFICE VISIT (OUTPATIENT)
Dept: MEDICAL GROUP | Facility: PHYSICIAN GROUP | Age: 69
End: 2019-09-10
Payer: MEDICARE

## 2019-09-10 VITALS
HEIGHT: 67 IN | OXYGEN SATURATION: 94 % | HEART RATE: 86 BPM | BODY MASS INDEX: 31.73 KG/M2 | TEMPERATURE: 97.3 F | WEIGHT: 202.16 LBS | SYSTOLIC BLOOD PRESSURE: 122 MMHG | DIASTOLIC BLOOD PRESSURE: 74 MMHG

## 2019-09-10 DIAGNOSIS — E11.65 TYPE 2 DIABETES MELLITUS WITH HYPERGLYCEMIA, WITH LONG-TERM CURRENT USE OF INSULIN (HCC): ICD-10-CM

## 2019-09-10 DIAGNOSIS — Z79.4 TYPE 2 DIABETES MELLITUS WITH HYPERGLYCEMIA, WITH LONG-TERM CURRENT USE OF INSULIN (HCC): ICD-10-CM

## 2019-09-10 DIAGNOSIS — R25.2 MUSCLE CRAMPS: ICD-10-CM

## 2019-09-10 DIAGNOSIS — M54.9 UPPER BACK PAIN: ICD-10-CM

## 2019-09-10 PROCEDURE — 99214 OFFICE O/P EST MOD 30 MIN: CPT | Performed by: FAMILY MEDICINE

## 2019-09-10 RX ORDER — METFORMIN HYDROCHLORIDE 500 MG/1
TABLET, EXTENDED RELEASE ORAL
Qty: 360 TAB | Refills: 1 | Status: SHIPPED | OUTPATIENT
Start: 2019-09-10 | End: 2019-12-22 | Stop reason: SDUPTHER

## 2019-09-10 RX ORDER — CYCLOBENZAPRINE HCL 5 MG
5 TABLET ORAL
Qty: 30 TAB | Refills: 2 | Status: SHIPPED | OUTPATIENT
Start: 2019-09-10 | End: 2019-10-10

## 2019-09-10 RX ORDER — LANCETS
EACH MISCELLANEOUS
Qty: 500 EACH | Refills: 1 | Status: SHIPPED | OUTPATIENT
Start: 2019-09-10 | End: 2019-09-25 | Stop reason: SDUPTHER

## 2019-09-10 RX ORDER — SYRINGE AND NEEDLE,INSULIN,1ML 31GX15/64"
1 SYRINGE, EMPTY DISPOSABLE MISCELLANEOUS
Qty: 150 EACH | Refills: 6 | Status: SHIPPED | OUTPATIENT
Start: 2019-09-10 | End: 2019-09-25 | Stop reason: SDUPTHER

## 2019-09-10 NOTE — ASSESSMENT & PLAN NOTE
Sees sameer ROMERO, A1c 9.7 end August. Reviewed current treatment. Needs to get eyes checked. Needs refill lancets and insulin needles.

## 2019-09-10 NOTE — ASSESSMENT & PLAN NOTE
Getting muscle cramps in his back, hands and feet. Seen UC, placed on Flexeril, made him drowsy but also relieved cramps in back.

## 2019-09-10 NOTE — PROGRESS NOTES
"Complaint: Pain     Subjective:     Carlos Pulido is a 69 y.o. male here today for f/u.    Restless legs syndrome (RLS)  Mirapex made him feel weird, like on drugs, stopped.    Muscle cramps  Getting muscle cramps in his back, hands and feet. Seen UC, placed on Flexeril, made him drowsy but also relieved cramps in back.    Uncontrolled type 2 diabetes mellitus with insulin therapy (CMS-[facility])  Sees sameer ROMERO, A1c 9.7 end August. Reviewed current treatment. Needs to get eyes checked. Needs refill lancets and insulin needles.     No other concerns or complaints today.    Current medicines (including changes today)  Current Outpatient Medications   Medication Sig Dispense Refill   • cyclobenzaprine (FLEXERIL) 5 MG tablet Take 1 Tab by mouth every bedtime for 30 days. 30 Tab 2   • RELION INSULIN SYRINGE 31G X 15/64\" 0.3 ML Misc Inject 1 Each as instructed 5 Times a Day. 150 Each 6   • ACCU-CHEK SOFTCLIX LANCETS Misc USE TO CHECK BLOOD SUGAR LEVELS 5 TIMES DAILY  **DX CODE E11.65** 500 Each 1   • metFORMIN ER (GLUCOPHAGE XR) 500 MG TABLET SR 24 HR TAKE 2 TABLETS BY MOUTH TWICE DAILY 360 Tab 1   • NOVOLIN N RELION 100 UNIT/ML Suspension INJECT 32 UNITS SUBCUTANEOUSLY AS INSTRUCTED TWICE DAILY 20 mL 6   • lisinopril (PRINIVIL, ZESTRIL) 40 MG tablet TAKE ONE TABLET BY MOUTH ONCE DAILY 90 Tab 1   • insulin NPH (NOVOLIN N RELION) 100 UNIT/ML Suspension Inject 32 Units as instructed 2 Times a Day. 20 mL 6   • insulin regular (NOVOLIN R RELION) 100 Unit/mL Solution Inject 16 Units as instructed 3 times a day before meals. 20 mL 6   • NOVOLIN R RELION 100 UNIT/ML Solution INJECT 16 UNITS SUBCUTANEOUSLY THREE TIMES DAILY BEFORE  MEALS 20 mL 5   • Empagliflozin (JARDIANCE) 10 MG Tab Take 1 Tab by mouth every day. 90 Tab 0   • meloxicam (MOBIC) 15 MG tablet Take 1 Tab by mouth every day. 30 Tab 2   • hydroCHLOROthiazide (HYDRODIURIL) 25 MG Tab Take 1 Tab by mouth every day. 90 Tab 1   • atorvastatin (LIPITOR) 40 MG Tab TAKE 1 TABLET " "BY MOUTH ONCE DAILY 90 Tab 1   • amLODIPine (NORVASC) 5 MG Tab Take 1 Tab by mouth every day. 90 Tab 1   • Insulin Pen Needle (BD PEN NEEDLE DAVIE U/F) 32G X 4 MM Misc For insulin shots 4 times a day 150 Each 6   • Diclofenac Sodium (VOLTAREN) 1 % Gel Apply 4 g to skin as directed 3 times a day as needed. 1 Tube 0   • aspirin 81 MG tablet Take 81 mg by mouth every day.     • glucose blood (ACCU-CHEK AMY PLUS) strip 1 strip to be used with each blood sugar testing 150 Strip 0   • ACCU-CHEK AMY PLUS strip USE 1 STRIP TO TEST 4 TIMES DAILY AS DIRECTED 150 Strip 0   • ACCU-CHEK AMY PLUS strip USE 1 STRIP TO CHECK GLUCOSE 4 TIMES DAILY **E11.65** 150 Strip 2   • PARoxetine (PAXIL) 20 MG Tab Take 1 Tab by mouth every day. (Patient not taking: Reported on 9/6/2019) 90 Tab 1   • ACCU-CHEK AMY PLUS strip USE 1 STRIP TO CHECK GLUCOSE 4 TIMES DAILY **E11.65** 150 Strip 0   • ACCU-CHEK AMY PLUS strip USE 1 STRIP TO CHECK GLUCOSE 4 TIMES DAILY **E11.65** 150 Strip 0   • lidocaine (LIDODERM) 5 % Patch Apply 1 Patch to skin as directed every 24 hours. as needed for nerve pain. 30 Patch 2   • glucose blood (ACCU-CHEK AMY PLUS) strip USE  STRIP TO CHECK GLUCOSE 4 TIMES DAILY **E11.65** 150 Strip 0     No current facility-administered medications for this visit.      He  has a past medical history of Anxiety (4/16/2013), Hypertension (4/16/2013), Low testosterone (4/16/2013), Meralgia paraesthetica, right (12/18/2017), PATTI (obstructive sleep apnea) (4/16/2013), and Restless legs syndrome (RLS) (4/16/2013).    Health Maintenance:       Allergies: Amoxicillin and Hydrocodone    Current Outpatient Medications Ordered in Epic   Medication Sig Dispense Refill   • cyclobenzaprine (FLEXERIL) 5 MG tablet Take 1 Tab by mouth every bedtime for 30 days. 30 Tab 2   • RELION INSULIN SYRINGE 31G X 15/64\" 0.3 ML Misc Inject 1 Each as instructed 5 Times a Day. 150 Each 6   • ACCU-CHEK SOFTCLIX LANCETS Misc USE TO CHECK BLOOD SUGAR LEVELS " 5 TIMES DAILY  **DX CODE E11.65** 500 Each 1   • metFORMIN ER (GLUCOPHAGE XR) 500 MG TABLET SR 24 HR TAKE 2 TABLETS BY MOUTH TWICE DAILY 360 Tab 1   • NOVOLIN N RELION 100 UNIT/ML Suspension INJECT 32 UNITS SUBCUTANEOUSLY AS INSTRUCTED TWICE DAILY 20 mL 6   • lisinopril (PRINIVIL, ZESTRIL) 40 MG tablet TAKE ONE TABLET BY MOUTH ONCE DAILY 90 Tab 1   • insulin NPH (NOVOLIN N RELION) 100 UNIT/ML Suspension Inject 32 Units as instructed 2 Times a Day. 20 mL 6   • insulin regular (NOVOLIN R RELION) 100 Unit/mL Solution Inject 16 Units as instructed 3 times a day before meals. 20 mL 6   • NOVOLIN R RELION 100 UNIT/ML Solution INJECT 16 UNITS SUBCUTANEOUSLY THREE TIMES DAILY BEFORE  MEALS 20 mL 5   • Empagliflozin (JARDIANCE) 10 MG Tab Take 1 Tab by mouth every day. 90 Tab 0   • meloxicam (MOBIC) 15 MG tablet Take 1 Tab by mouth every day. 30 Tab 2   • hydroCHLOROthiazide (HYDRODIURIL) 25 MG Tab Take 1 Tab by mouth every day. 90 Tab 1   • atorvastatin (LIPITOR) 40 MG Tab TAKE 1 TABLET BY MOUTH ONCE DAILY 90 Tab 1   • amLODIPine (NORVASC) 5 MG Tab Take 1 Tab by mouth every day. 90 Tab 1   • Insulin Pen Needle (BD PEN NEEDLE DAVIE U/F) 32G X 4 MM Misc For insulin shots 4 times a day 150 Each 6   • Diclofenac Sodium (VOLTAREN) 1 % Gel Apply 4 g to skin as directed 3 times a day as needed. 1 Tube 0   • aspirin 81 MG tablet Take 81 mg by mouth every day.     • glucose blood (ACCU-CHEK AMY PLUS) strip 1 strip to be used with each blood sugar testing 150 Strip 0   • ACCU-CHEK AMY PLUS strip USE 1 STRIP TO TEST 4 TIMES DAILY AS DIRECTED 150 Strip 0   • ACCU-CHEK AMY PLUS strip USE 1 STRIP TO CHECK GLUCOSE 4 TIMES DAILY **E11.65** 150 Strip 2   • PARoxetine (PAXIL) 20 MG Tab Take 1 Tab by mouth every day. (Patient not taking: Reported on 9/6/2019) 90 Tab 1   • ACCU-CHEK AMY PLUS strip USE 1 STRIP TO CHECK GLUCOSE 4 TIMES DAILY **E11.65** 150 Strip 0   • ACCU-CHEK AMY PLUS strip USE 1 STRIP TO CHECK GLUCOSE 4 TIMES DAILY  **E11.65** 150 Strip 0   • lidocaine (LIDODERM) 5 % Patch Apply 1 Patch to skin as directed every 24 hours. as needed for nerve pain. 30 Patch 2   • glucose blood (ACCU-CHEK AMY PLUS) strip USE  STRIP TO CHECK GLUCOSE 4 TIMES DAILY **E11.65** 150 Strip 0     No current Epic-ordered facility-administered medications on file.        Past Medical History:   Diagnosis Date   • Anxiety 4/16/2013    As needed for anxiety   • Hypertension 4/16/2013   • Low testosterone 4/16/2013   • Meralgia paraesthetica, right 12/18/2017   • PATTI (obstructive sleep apnea) 4/16/2013    Cannot tolerate sleep apnea   • Restless legs syndrome (RLS) 4/16/2013       Past Surgical History:   Procedure Laterality Date   • KNEE REPLACEMENT, TOTAL Right 2011    done in [facility]   • KNEE REPLACEMENT, TOTAL Left 2009    done in [facility]   • RHINOPLASTY         Social History     Tobacco Use   • Smoking status: Never Smoker   • Smokeless tobacco: Never Used   Substance Use Topics   • Alcohol use: Yes     Alcohol/week: 0.0 oz     Comment: x2 drinks monthly   • Drug use: Yes     Types: Marijuana     Comment: occasional        Social History     Social History Narrative    Retired from construction (pipe line work)       Family History   Problem Relation Age of Onset   • Diabetes Mother    • Diabetes Father    • Diabetes Sister    • Diabetes Brother    • No Known Problems Brother    • Heart Disease Neg Hx    • Heart Failure Neg Hx          ROS Positive for back pain, worse at night, spasms in hands, feet.  Patient denies any fever, chills, unintentional weight gain/loss, fatigue, stroke symptoms, dizziness, headache, nasal congestion, sore-throat, cough, heartburn, chest pain, difficulty breathing, abdominal discomfort, diarrhea/constipation, burning with urination or frequency, joint or back pain, skin rashes, depression or anxiety.       Objective:     /74 (BP Location: Right arm, Patient Position: Sitting)   Pulse 86    "Temp 36.3 °C (97.3 °F) (Temporal)   Ht 1.702 m (5' 7\")   Wt 91.7 kg (202 lb 2.6 oz)   SpO2 94%  Body mass index is 31.66 kg/m².   Physical Exam:  Constitutional: Alert, no distress.  No formal exam      Assessment and Plan:   The following treatment plan was discussed    1. Upper back pain  Try Otc patches. Heat/ice as needed.  - cyclobenzaprine (FLEXERIL) 5 MG tablet; Take 1 Tab by mouth every bedtime for 30 days.  Dispense: 30 Tab; Refill: 2    2. Muscle cramps  Recommend otc po tonic water, magnesium supplements.  - cyclobenzaprine (FLEXERIL) 5 MG tablet; Take 1 Tab by mouth every bedtime for 30 days.  Dispense: 30 Tab; Refill: 2    3. Type 2 diabetes mellitus with hyperglycemia, with long-term current use of insulin (HCC)  Uncontrolled. Needs to f/u with endo. Needs to get eyes checked.  - RELION INSULIN SYRINGE 31G X 15/64\" 0.3 ML Misc; Inject 1 Each as instructed 5 Times a Day.  Dispense: 150 Each; Refill: 6  - ACCU-CHEK SOFTCLIX LANCETS Misc; USE TO CHECK BLOOD SUGAR LEVELS 5 TIMES DAILY  **DX CODE E11.65**  Dispense: 500 Each; Refill: 1  - metFORMIN ER (GLUCOPHAGE XR) 500 MG TABLET SR 24 HR; TAKE 2 TABLETS BY MOUTH TWICE DAILY  Dispense: 360 Tab; Refill: 1      Followup: Return in about 6 months (around 3/10/2020), or if symptoms worsen or fail to improve.    Please note that this dictation was created using voice recognition software. I have made every reasonable attempt to correct obvious errors, but I expect that there are errors of grammar and possibly content that I did not discover before finalizing the note.           "

## 2019-09-25 ENCOUNTER — PATIENT MESSAGE (OUTPATIENT)
Dept: MEDICAL GROUP | Facility: PHYSICIAN GROUP | Age: 69
End: 2019-09-25

## 2019-09-25 DIAGNOSIS — E11.65 TYPE 2 DIABETES MELLITUS WITH HYPERGLYCEMIA, WITH LONG-TERM CURRENT USE OF INSULIN (HCC): ICD-10-CM

## 2019-09-25 DIAGNOSIS — Z79.4 TYPE 2 DIABETES MELLITUS WITH HYPERGLYCEMIA, WITH LONG-TERM CURRENT USE OF INSULIN (HCC): ICD-10-CM

## 2019-09-25 RX ORDER — SYRINGE AND NEEDLE,INSULIN,1ML 31GX15/64"
1 SYRINGE, EMPTY DISPOSABLE MISCELLANEOUS
Qty: 150 EACH | Refills: 6 | Status: SHIPPED | OUTPATIENT
Start: 2019-09-25 | End: 2019-12-22 | Stop reason: SDUPTHER

## 2019-09-25 RX ORDER — LANCETS
EACH MISCELLANEOUS
Qty: 500 EACH | Refills: 1 | Status: SHIPPED | OUTPATIENT
Start: 2019-09-25 | End: 2021-06-08 | Stop reason: SDUPTHER

## 2019-09-25 NOTE — TELEPHONE ENCOUNTER
----- Message from Carlos Pulido sent at 9/25/2019 10:20 AM PDT -----  Regarding: Prescription Question  Contact: [phone]  I need Dr. Pantoja to send a refill for accu ck lancets and insulin regular. I am completly out of my insulin.   Please send to [facility] on [address] in [place].    please let me know when this is done.    Thank you,    Jose Pulido   [phone]

## 2019-09-30 ENCOUNTER — OFFICE VISIT (OUTPATIENT)
Dept: URGENT CARE | Facility: CLINIC | Age: 69
End: 2019-09-30
Payer: MEDICARE

## 2019-09-30 VITALS
TEMPERATURE: 97.6 F | HEIGHT: 67 IN | DIASTOLIC BLOOD PRESSURE: 84 MMHG | SYSTOLIC BLOOD PRESSURE: 156 MMHG | HEART RATE: 102 BPM | WEIGHT: 201 LBS | RESPIRATION RATE: 14 BRPM | BODY MASS INDEX: 31.55 KG/M2 | OXYGEN SATURATION: 94 %

## 2019-09-30 DIAGNOSIS — R07.89 CHEST DISCOMFORT: ICD-10-CM

## 2019-09-30 PROCEDURE — 99214 OFFICE O/P EST MOD 30 MIN: CPT | Performed by: FAMILY MEDICINE

## 2019-09-30 PROCEDURE — 93000 ELECTROCARDIOGRAM COMPLETE: CPT | Performed by: FAMILY MEDICINE

## 2019-09-30 RX ORDER — ASPIRIN 81 MG/1
324 TABLET, CHEWABLE ORAL ONCE
Status: COMPLETED | OUTPATIENT
Start: 2019-09-30 | End: 2019-09-30

## 2019-09-30 RX ADMIN — ASPIRIN 324 MG: 81 TABLET, CHEWABLE ORAL at 12:50

## 2019-09-30 NOTE — PROGRESS NOTES
"Subjective:      Carlos Pulido is a 69 y.o. male who presents with Chest Pain (sternum)      - This is a pleasant and non toxic appearing 69 y.o. male with c/o waxing waning sternal pain x 2-3 wks. Nothing seems to trigger it or make it better or worse. Not associated w/ NVFC or diaphoresis SOB or dizziness. Has been having some intermittent pains to his upper back as well during this time period.     EKG: NSR, RBBB, + ST wave changes in lateral leads    * I asked patient to go directly to local ER here in town by EMS today for eval.             ALLERGIES:  Amoxicillin; Hydrocodone; and Mirapex [pramipexole]     PMH:  Past Medical History:   Diagnosis Date   • Anxiety 4/16/2013    As needed for anxiety   • Hypertension 4/16/2013   • Low testosterone 4/16/2013   • Meralgia paraesthetica, right 12/18/2017   • PATTI (obstructive sleep apnea) 4/16/2013    Cannot tolerate sleep apnea   • Restless legs syndrome (RLS) 4/16/2013        PSH:  Past Surgical History:   Procedure Laterality Date   • KNEE REPLACEMENT, TOTAL Right 2011    done in [facility]   • KNEE REPLACEMENT, TOTAL Left 2009    done in [facility]   • RHINOPLASTY         MEDS:    Current Outpatient Medications:   •  glucose blood (ACCU-CHEK AMY PLUS) strip, USE 1 STRIP TO CHECK GLUCOSE 4 TIMES DAILY  E11.65, Disp: 150 Strip, Rfl: 2  •  ACCU-CHEK SOFTCLIX LANCETS Misc, USE TO CHECK BLOOD SUGAR LEVELS 5 TIMES DAILY  **DX CODE E11.65**, Disp: 500 Each, Rfl: 1  •  RELION INSULIN SYRINGE 31G X 15/64\" 0.3 ML Misc, Inject 1 Each as instructed 5 Times a Day., Disp: 150 Each, Rfl: 6  •  cyclobenzaprine (FLEXERIL) 5 MG tablet, Take 1 Tab by mouth every bedtime for 30 days., Disp: 30 Tab, Rfl: 2  •  metFORMIN ER (GLUCOPHAGE XR) 500 MG TABLET SR 24 HR, TAKE 2 TABLETS BY MOUTH TWICE DAILY, Disp: 360 Tab, Rfl: 1  •  NOVOLIN N RELION 100 UNIT/ML Suspension, INJECT 32 UNITS SUBCUTANEOUSLY AS INSTRUCTED TWICE DAILY, Disp: 20 mL, Rfl: 6  •  lisinopril " (PRINIVIL, ZESTRIL) 40 MG tablet, TAKE ONE TABLET BY MOUTH ONCE DAILY, Disp: 90 Tab, Rfl: 1  •  insulin NPH (NOVOLIN N RELION) 100 UNIT/ML Suspension, Inject 32 Units as instructed 2 Times a Day., Disp: 20 mL, Rfl: 6  •  insulin regular (NOVOLIN R RELION) 100 Unit/mL Solution, Inject 16 Units as instructed 3 times a day before meals., Disp: 20 mL, Rfl: 6  •  NOVOLIN R RELION 100 UNIT/ML Solution, INJECT 16 UNITS SUBCUTANEOUSLY THREE TIMES DAILY BEFORE  MEALS, Disp: 20 mL, Rfl: 5  •  Empagliflozin (JARDIANCE) 10 MG Tab, Take 1 Tab by mouth every day., Disp: 90 Tab, Rfl: 0  •  glucose blood (ACCU-CHEK AMY PLUS) strip, 1 strip to be used with each blood sugar testing, Disp: 150 Strip, Rfl: 0  •  ACCU-CHEK AMY PLUS strip, USE 1 STRIP TO TEST 4 TIMES DAILY AS DIRECTED, Disp: 150 Strip, Rfl: 0  •  meloxicam (MOBIC) 15 MG tablet, Take 1 Tab by mouth every day., Disp: 30 Tab, Rfl: 2  •  hydroCHLOROthiazide (HYDRODIURIL) 25 MG Tab, Take 1 Tab by mouth every day., Disp: 90 Tab, Rfl: 1  •  atorvastatin (LIPITOR) 40 MG Tab, TAKE 1 TABLET BY MOUTH ONCE DAILY, Disp: 90 Tab, Rfl: 1  •  amLODIPine (NORVASC) 5 MG Tab, Take 1 Tab by mouth every day., Disp: 90 Tab, Rfl: 1  •  PARoxetine (PAXIL) 20 MG Tab, Take 1 Tab by mouth every day. (Patient not taking: Reported on 9/6/2019), Disp: 90 Tab, Rfl: 1  •  ACCU-CHEK AMY PLUS strip, USE 1 STRIP TO CHECK GLUCOSE 4 TIMES DAILY **E11.65**, Disp: 150 Strip, Rfl: 0  •  ACCU-CHEK AMY PLUS strip, USE 1 STRIP TO CHECK GLUCOSE 4 TIMES DAILY **E11.65**, Disp: 150 Strip, Rfl: 0  •  lidocaine (LIDODERM) 5 % Patch, Apply 1 Patch to skin as directed every 24 hours. as needed for nerve pain., Disp: 30 Patch, Rfl: 2  •  glucose blood (ACCU-CHEK AMY PLUS) strip, USE  STRIP TO CHECK GLUCOSE 4 TIMES DAILY **E11.65**, Disp: 150 Strip, Rfl: 0  •  Insulin Pen Needle (BD PEN NEEDLE DAVIE U/F) 32G X 4 MM Misc, For insulin shots 4 times a day, Disp: 150 Each, Rfl: 6  •  Diclofenac Sodium (VOLTAREN) 1 %  "Gel, Apply 4 g to skin as directed 3 times a day as needed., Disp: 1 Tube, Rfl: 0  •  aspirin 81 MG tablet, Take 81 mg by mouth every day., Disp: , Rfl:     Current Facility-Administered Medications:   •  aspirin (ASA) chewable tab 324 mg, 324 mg, Oral, Once, Alessandro Nguyễn M.D.    ** I have documented what I find to be significant in regards to past medical, social, family and surgical history  in my HPI or under PMH/PSH/FH review section, otherwise it is contributory **           HPI    Review of Systems   All other systems reviewed and are negative.         Objective:     /84   Pulse (!) 102   Temp 36.4 °C (97.6 °F)   Resp 14   Ht 1.702 m (5' 7\")   Wt 91.2 kg (201 lb)   SpO2 94%   BMI 31.48 kg/m²      Physical Exam   Constitutional: He appears well-developed and well-nourished. No distress.   HENT:   Head: Normocephalic and atraumatic.   Eyes: Conjunctivae are normal.   Cardiovascular: Normal heart sounds.   No murmur heard.  Pulmonary/Chest: Effort normal and breath sounds normal. No respiratory distress.   Neurological: He is alert. He exhibits normal muscle tone.   Skin: Skin is warm and dry. He is not diaphoretic.   Psychiatric: He has a normal mood and affect. Judgment normal.   Nursing note and vitals reviewed.              Assessment/Plan:         1. Chest discomfort  EKG - Clinic Performed    aspirin (ASA) chewable tab 324 mg                "

## 2019-11-20 DIAGNOSIS — I10 ESSENTIAL HYPERTENSION: Chronic | ICD-10-CM

## 2019-11-20 DIAGNOSIS — E78.2 MIXED HYPERLIPIDEMIA: ICD-10-CM

## 2019-11-20 RX ORDER — ATORVASTATIN CALCIUM 40 MG/1
TABLET, FILM COATED ORAL
Qty: 90 TAB | Refills: 1 | Status: SHIPPED | OUTPATIENT
Start: 2019-11-20 | End: 2019-12-22 | Stop reason: SDUPTHER

## 2019-11-20 RX ORDER — AMLODIPINE BESYLATE 5 MG/1
TABLET ORAL
Qty: 90 TAB | Refills: 1 | Status: SHIPPED | OUTPATIENT
Start: 2019-11-20 | End: 2019-12-22 | Stop reason: SDUPTHER

## 2019-11-20 RX ORDER — LISINOPRIL 40 MG/1
TABLET ORAL
Qty: 90 TAB | Refills: 1 | Status: SHIPPED | OUTPATIENT
Start: 2019-11-20 | End: 2019-12-22 | Stop reason: SDUPTHER

## 2019-12-19 ENCOUNTER — OFFICE VISIT (OUTPATIENT)
Dept: ENDOCRINOLOGY | Facility: MEDICAL CENTER | Age: 69
End: 2019-12-19
Payer: MEDICARE

## 2019-12-19 VITALS
BODY MASS INDEX: 30.92 KG/M2 | OXYGEN SATURATION: 95 % | HEIGHT: 67 IN | DIASTOLIC BLOOD PRESSURE: 82 MMHG | WEIGHT: 197 LBS | HEART RATE: 90 BPM | SYSTOLIC BLOOD PRESSURE: 130 MMHG

## 2019-12-19 DIAGNOSIS — E78.5 HYPERLIPIDEMIA, UNSPECIFIED HYPERLIPIDEMIA TYPE: ICD-10-CM

## 2019-12-19 DIAGNOSIS — E78.2 MIXED HYPERLIPIDEMIA: ICD-10-CM

## 2019-12-19 DIAGNOSIS — I10 HYPERTENSION, UNSPECIFIED TYPE: ICD-10-CM

## 2019-12-19 LAB
HBA1C MFR BLD: 9 % (ref 0–5.6)
INT CON NEG: NEGATIVE
INT CON POS: POSITIVE

## 2019-12-19 PROCEDURE — 83036 HEMOGLOBIN GLYCOSYLATED A1C: CPT | Performed by: PHYSICIAN ASSISTANT

## 2019-12-19 PROCEDURE — 99214 OFFICE O/P EST MOD 30 MIN: CPT | Performed by: PHYSICIAN ASSISTANT

## 2019-12-22 DIAGNOSIS — E11.65 TYPE 2 DIABETES MELLITUS WITH HYPERGLYCEMIA, WITH LONG-TERM CURRENT USE OF INSULIN (HCC): ICD-10-CM

## 2019-12-22 DIAGNOSIS — M25.561 CHRONIC PAIN OF BOTH KNEES: ICD-10-CM

## 2019-12-22 DIAGNOSIS — G89.29 CHRONIC LOW BACK PAIN WITH LEFT-SIDED SCIATICA, UNSPECIFIED BACK PAIN LATERALITY: ICD-10-CM

## 2019-12-22 DIAGNOSIS — M25.562 CHRONIC PAIN OF BOTH KNEES: ICD-10-CM

## 2019-12-22 DIAGNOSIS — Z79.4 TYPE 2 DIABETES MELLITUS WITH HYPERGLYCEMIA, WITH LONG-TERM CURRENT USE OF INSULIN (HCC): ICD-10-CM

## 2019-12-22 DIAGNOSIS — E78.2 MIXED HYPERLIPIDEMIA: ICD-10-CM

## 2019-12-22 DIAGNOSIS — G89.29 CHRONIC PAIN OF BOTH KNEES: ICD-10-CM

## 2019-12-22 DIAGNOSIS — M54.42 CHRONIC LOW BACK PAIN WITH LEFT-SIDED SCIATICA, UNSPECIFIED BACK PAIN LATERALITY: ICD-10-CM

## 2019-12-22 DIAGNOSIS — I10 ESSENTIAL HYPERTENSION: Chronic | ICD-10-CM

## 2019-12-23 RX ORDER — LISINOPRIL 40 MG/1
TABLET ORAL
Qty: 90 TAB | Refills: 0 | Status: SHIPPED | OUTPATIENT
Start: 2019-12-23 | End: 2020-03-13

## 2019-12-23 RX ORDER — AMLODIPINE BESYLATE 5 MG/1
5 TABLET ORAL DAILY
Qty: 90 TAB | Refills: 0 | Status: SHIPPED | OUTPATIENT
Start: 2019-12-23 | End: 2020-03-23

## 2019-12-23 RX ORDER — ATORVASTATIN CALCIUM 40 MG/1
40 TABLET, FILM COATED ORAL DAILY
Qty: 90 TAB | Refills: 0 | Status: SHIPPED | OUTPATIENT
Start: 2019-12-23 | End: 2020-03-23

## 2019-12-23 RX ORDER — HYDROCHLOROTHIAZIDE 25 MG/1
25 TABLET ORAL DAILY
Qty: 90 TAB | Refills: 0 | Status: SHIPPED | OUTPATIENT
Start: 2019-12-23 | End: 2020-03-23

## 2019-12-23 RX ORDER — SYRINGE AND NEEDLE,INSULIN,1ML 31GX15/64"
1 SYRINGE, EMPTY DISPOSABLE MISCELLANEOUS
Qty: 150 EACH | Refills: 6 | Status: SHIPPED | OUTPATIENT
Start: 2019-12-23 | End: 2020-10-10

## 2019-12-23 RX ORDER — LIDOCAINE 50 MG/G
1 PATCH TOPICAL EVERY 24 HOURS
Qty: 30 PATCH | Refills: 2 | Status: SHIPPED | OUTPATIENT
Start: 2019-12-23 | End: 2020-10-10

## 2019-12-23 RX ORDER — LISINOPRIL 40 MG/1
40 TABLET ORAL DAILY
Qty: 90 TAB | Refills: 1 | Status: SHIPPED | OUTPATIENT
Start: 2019-12-23 | End: 2020-09-15 | Stop reason: SDUPTHER

## 2019-12-23 RX ORDER — METFORMIN HYDROCHLORIDE 500 MG/1
TABLET, EXTENDED RELEASE ORAL
Qty: 360 TAB | Refills: 0 | Status: SHIPPED
Start: 2019-12-23 | End: 2020-03-13

## 2019-12-26 ENCOUNTER — HOSPITAL ENCOUNTER (OUTPATIENT)
Dept: LAB | Facility: MEDICAL CENTER | Age: 69
End: 2019-12-26
Attending: PHYSICIAN ASSISTANT
Payer: MEDICARE

## 2019-12-26 DIAGNOSIS — M25.50 CHRONIC PAIN OF MULTIPLE JOINTS: ICD-10-CM

## 2019-12-26 DIAGNOSIS — G89.29 CHRONIC PAIN OF MULTIPLE JOINTS: ICD-10-CM

## 2019-12-26 LAB
ALBUMIN SERPL BCP-MCNC: 3.9 G/DL (ref 3.2–4.9)
ALBUMIN/GLOB SERPL: 1.2 G/DL
ALP SERPL-CCNC: 102 U/L (ref 30–99)
ALT SERPL-CCNC: 17 U/L (ref 2–50)
ANION GAP SERPL CALC-SCNC: 10 MMOL/L (ref 0–11.9)
AST SERPL-CCNC: 14 U/L (ref 12–45)
BASOPHILS # BLD AUTO: 0.6 % (ref 0–1.8)
BASOPHILS # BLD: 0.05 K/UL (ref 0–0.12)
BILIRUB SERPL-MCNC: 0.6 MG/DL (ref 0.1–1.5)
BUN SERPL-MCNC: 14 MG/DL (ref 8–22)
CALCIUM SERPL-MCNC: 8.7 MG/DL (ref 8.5–10.5)
CHLORIDE SERPL-SCNC: 103 MMOL/L (ref 96–112)
CHOLEST SERPL-MCNC: 139 MG/DL (ref 100–199)
CO2 SERPL-SCNC: 27 MMOL/L (ref 20–33)
CREAT SERPL-MCNC: 1.11 MG/DL (ref 0.5–1.4)
CRP SERPL HS-MCNC: 0.48 MG/DL (ref 0–0.75)
EOSINOPHIL # BLD AUTO: 0.11 K/UL (ref 0–0.51)
EOSINOPHIL NFR BLD: 1.4 % (ref 0–6.9)
ERYTHROCYTE [DISTWIDTH] IN BLOOD BY AUTOMATED COUNT: 41.5 FL (ref 35.9–50)
GLOBULIN SER CALC-MCNC: 3.3 G/DL (ref 1.9–3.5)
GLUCOSE SERPL-MCNC: 120 MG/DL (ref 65–99)
HCT VFR BLD AUTO: 47.3 % (ref 42–52)
HDLC SERPL-MCNC: 48 MG/DL
HGB BLD-MCNC: 16 G/DL (ref 14–18)
IMM GRANULOCYTES # BLD AUTO: 0.02 K/UL (ref 0–0.11)
IMM GRANULOCYTES NFR BLD AUTO: 0.2 % (ref 0–0.9)
LDLC SERPL CALC-MCNC: 68 MG/DL
LYMPHOCYTES # BLD AUTO: 1.57 K/UL (ref 1–4.8)
LYMPHOCYTES NFR BLD: 19.3 % (ref 22–41)
MCH RBC QN AUTO: 29.9 PG (ref 27–33)
MCHC RBC AUTO-ENTMCNC: 33.8 G/DL (ref 33.7–35.3)
MCV RBC AUTO: 88.2 FL (ref 81.4–97.8)
MONOCYTES # BLD AUTO: 0.55 K/UL (ref 0–0.85)
MONOCYTES NFR BLD AUTO: 6.8 % (ref 0–13.4)
NEUTROPHILS # BLD AUTO: 5.83 K/UL (ref 1.82–7.42)
NEUTROPHILS NFR BLD: 71.7 % (ref 44–72)
NRBC # BLD AUTO: 0 K/UL
NRBC BLD-RTO: 0 /100 WBC
PLATELET # BLD AUTO: 253 K/UL (ref 164–446)
PMV BLD AUTO: 9.8 FL (ref 9–12.9)
POTASSIUM SERPL-SCNC: 3.6 MMOL/L (ref 3.6–5.5)
PROT SERPL-MCNC: 7.2 G/DL (ref 6–8.2)
RBC # BLD AUTO: 5.36 M/UL (ref 4.7–6.1)
RHEUMATOID FACT SER IA-ACNC: 27 IU/ML (ref 0–14)
SODIUM SERPL-SCNC: 140 MMOL/L (ref 135–145)
TRIGL SERPL-MCNC: 113 MG/DL (ref 0–149)
WBC # BLD AUTO: 8.1 K/UL (ref 4.8–10.8)

## 2019-12-26 PROCEDURE — 86140 C-REACTIVE PROTEIN: CPT

## 2019-12-26 PROCEDURE — 80053 COMPREHEN METABOLIC PANEL: CPT

## 2019-12-26 PROCEDURE — 83036 HEMOGLOBIN GLYCOSYLATED A1C: CPT | Mod: GA

## 2019-12-26 PROCEDURE — 85025 COMPLETE CBC W/AUTO DIFF WBC: CPT

## 2019-12-26 PROCEDURE — 36415 COLL VENOUS BLD VENIPUNCTURE: CPT

## 2019-12-26 PROCEDURE — 86431 RHEUMATOID FACTOR QUANT: CPT

## 2019-12-26 PROCEDURE — 80061 LIPID PANEL: CPT

## 2019-12-27 LAB
EST. AVERAGE GLUCOSE BLD GHB EST-MCNC: 217 MG/DL
HBA1C MFR BLD: 9.2 % (ref 0–5.6)

## 2020-01-16 ENCOUNTER — TELEPHONE (OUTPATIENT)
Dept: MEDICAL GROUP | Facility: PHYSICIAN GROUP | Age: 70
End: 2020-01-16

## 2020-01-16 NOTE — TELEPHONE ENCOUNTER
Patient's wife called requesting that we send the order for Accu-Check Marie strips to [facility] as the pharmacy is saying that they did not receive the script back on 01/15/2020    Patient's wife is requesting a call back to be notified when the medication has been filled.

## 2020-01-16 NOTE — TELEPHONE ENCOUNTER
I called the pharmacy to do a verbal since they didn't receive it. They stated it had to either be escribed or faxed in. Please advise

## 2020-02-14 ENCOUNTER — APPOINTMENT (OUTPATIENT)
Dept: ENDOCRINOLOGY | Facility: MEDICAL CENTER | Age: 70
End: 2020-02-14
Payer: MEDICARE

## 2020-03-12 DIAGNOSIS — E11.65 TYPE 2 DIABETES MELLITUS WITH HYPERGLYCEMIA, WITH LONG-TERM CURRENT USE OF INSULIN (HCC): ICD-10-CM

## 2020-03-12 DIAGNOSIS — Z79.4 TYPE 2 DIABETES MELLITUS WITH HYPERGLYCEMIA, WITH LONG-TERM CURRENT USE OF INSULIN (HCC): ICD-10-CM

## 2020-03-12 NOTE — TELEPHONE ENCOUNTER
Pt called and stated that he is out of his Insulin - R.  Please contact [facility] and send a refill order.  Call when ready.  [phone]

## 2020-03-13 ENCOUNTER — OFFICE VISIT (OUTPATIENT)
Dept: ENDOCRINOLOGY | Facility: MEDICAL CENTER | Age: 70
End: 2020-03-13
Payer: MEDICARE

## 2020-03-13 VITALS
SYSTOLIC BLOOD PRESSURE: 124 MMHG | WEIGHT: 188 LBS | DIASTOLIC BLOOD PRESSURE: 60 MMHG | HEIGHT: 67 IN | HEART RATE: 93 BPM | BODY MASS INDEX: 29.51 KG/M2 | OXYGEN SATURATION: 97 %

## 2020-03-13 DIAGNOSIS — I15.2 HYPERTENSION ASSOCIATED WITH DIABETES (HCC): ICD-10-CM

## 2020-03-13 DIAGNOSIS — Z79.4 TYPE 2 DIABETES MELLITUS WITH HYPERGLYCEMIA, WITH LONG-TERM CURRENT USE OF INSULIN (HCC): ICD-10-CM

## 2020-03-13 DIAGNOSIS — E78.5 HYPERLIPIDEMIA ASSOCIATED WITH TYPE 2 DIABETES MELLITUS (HCC): ICD-10-CM

## 2020-03-13 DIAGNOSIS — E11.65 TYPE 2 DIABETES MELLITUS WITH HYPERGLYCEMIA, WITH LONG-TERM CURRENT USE OF INSULIN (HCC): ICD-10-CM

## 2020-03-13 DIAGNOSIS — E11.59 HYPERTENSION ASSOCIATED WITH DIABETES (HCC): ICD-10-CM

## 2020-03-13 DIAGNOSIS — E11.69 HYPERLIPIDEMIA ASSOCIATED WITH TYPE 2 DIABETES MELLITUS (HCC): ICD-10-CM

## 2020-03-13 DIAGNOSIS — Z79.4 LONG-TERM INSULIN USE (HCC): ICD-10-CM

## 2020-03-13 PROCEDURE — 99214 OFFICE O/P EST MOD 30 MIN: CPT | Performed by: INTERNAL MEDICINE

## 2020-03-13 RX ORDER — SEMAGLUTIDE 1.34 MG/ML
0.5 INJECTION, SOLUTION SUBCUTANEOUS
Qty: 1 PEN | Refills: 3 | Status: SHIPPED | OUTPATIENT
Start: 2020-03-13 | End: 2020-09-15 | Stop reason: SDUPTHER

## 2020-03-13 RX ORDER — EMPAGLIFLOZIN 25 MG/1
1 TABLET, FILM COATED ORAL DAILY
Qty: 30 TAB | Refills: 3 | Status: SHIPPED | OUTPATIENT
Start: 2020-03-13 | End: 2020-09-15 | Stop reason: SDUPTHER

## 2020-03-13 RX ORDER — METFORMIN HYDROCHLORIDE 500 MG/1
1000 TABLET, EXTENDED RELEASE ORAL 2 TIMES DAILY
Qty: 120 TAB | Refills: 3 | Status: SHIPPED | OUTPATIENT
Start: 2020-03-13 | End: 2020-09-15 | Stop reason: SDUPTHER

## 2020-03-13 RX ORDER — INSULIN DEGLUDEC 100 U/ML
30 INJECTION, SOLUTION SUBCUTANEOUS
Qty: 5 PEN | Refills: 3 | Status: SHIPPED | OUTPATIENT
Start: 2020-03-13 | End: 2020-09-15 | Stop reason: SDUPTHER

## 2020-03-13 ASSESSMENT — FIBROSIS 4 INDEX: FIB4 SCORE: 0.94

## 2020-03-13 NOTE — PROGRESS NOTES
CHIEF COMPLAINT: Patient is here for follow up of Type 2 Diabetes Mellitus    HPI:     Carlos Pulido is a 69 y.o. male with Type 2 Diabetes Mellitus here for follow up.    Labs from December 26, 2019 showed an elevated A1c of 9.2%.    He was previously followed by NICK Palacios.   this is my first time seeing him.      He was originally taking Ozempic, Jardiance, Tresiba and metformin and had well-controlled sugars but ran out of samples.  He was only relying on samples from the PA and never filled any prescription.    He ran out of medications and has been taking NPH 32 units twice a day and regular insulin 16 units 3 times a day with each meal along with metformin.    He reports that he is doing fair.  He denies hyperglycemia and hypoglycemia.    He does not check his sugars regularly and he did not bring his glucose meter.    He has hyperlipidemia.  He is taking Lipitor 40 mg daily and he is tolerating this fairly well.  His last LDL cholesterol was well controlled at 68 with triglycerides of 113 on December 2019.    He has essential hypertension and is tolerating his Lisinopril.  He has not had a test of his urine microalbumin creatinine ratio in over 2 years.  He has not had an eye exam in over 2 years       BG Diary:03/13/20  Patient does not check his sugars    Weight has been stable    Diabetes Complications   Retinopathy: No known retinopathy.  Last eye exam: Point-of-care eye exam was completed today  Neuropathy: Denies paresthesias or numbness in hands or feet. Denies any foot wounds.  Exercise: Minimal.  Diet: Fair.  Patient's medications, allergies, and social histories were reviewed and updated as appropriate.    ROS:     CONS:     No fever, no chills   EYES:     No diplopia, no blurry vision   CV:           No chest pain, no palpitations   PULM:     No SOB, no cough, no hemoptysis.   GI:            No nausea, no vomiting, no diarrhea, no constipation   ENDO:     No polyuria, no polydipsia, no  "heat intolerance, no cold intolerance       Past Medical History:  Problem List:  2019-09: Muscle cramps  2019-06: Chronic pain of multiple joints  2019-03: Hx of seasonal allergies  2018-11: Chronic low back pain  2018-07: Excessive cerumen in both ear canals  2018-07: Constipation  2017-12: Meralgia paraesthetica, right  2017-12: Chronic pain of both knees  2017-12: Ear fullness, left  2017-03: Uncontrolled type 2 diabetes mellitus with insulin therapy   ([facility])  2017-03: Non compliance w medication regimen  2015-09: Diabetes mellitus type II, uncontrolled ([facility])  2015-09: Obesity (BMI 30.0-34.9)  2015-07: External hemorrhoid, bleeding  2015-07: Chronic insomnia  2013-04: Hypertension  2013-04: Diabetes mellitus type 2, uncontrolled ([facility])  2013-04: Restless legs syndrome (RLS)  2013-04: PATTI (obstructive sleep apnea)  2013-04: Low testosterone  2013-04: Anxiety  2013-04: Routine health maintenance      Past Surgical History:  Past Surgical History:   Procedure Laterality Date   • KNEE REPLACEMENT, TOTAL Right 2011    done in [facility]   • KNEE REPLACEMENT, TOTAL Left 2009    done in [facility]   • RHINOPLASTY          Allergies:  Amoxicillin; Hydrocodone; and Mirapex [pramipexole]     Social History:  Social History     Tobacco Use   • Smoking status: Never Smoker   • Smokeless tobacco: Never Used   Substance Use Topics   • Alcohol use: Yes     Alcohol/week: 0.0 oz     Comment: x2 drinks monthly   • Drug use: Yes     Types: Marijuana     Comment: occasional         Family History:   family history includes Diabetes in his brother, father, mother, and sister; No Known Problems in his brother.      PHYSICAL EXAM:   OBJECTIVE:  Vital signs: /60   Pulse 93   Ht 1.702 m (5' 7\")   Wt 85.3 kg (188 lb)   SpO2 97%   BMI 29.44 kg/m²   GENERAL: Well-developed, well-nourished in no apparent distress.   EYE:  No ocular asymmetry, PERRLA  HENT: Pink, moist mucous membranes.    NECK: No thyromegaly. "   CARDIOVASCULAR:  No murmurs  LUNGS: Clear breath sounds  ABDOMEN: Soft, nontender   EXTREMITIES: No clubbing, cyanosis, or edema.   NEUROLOGICAL: No gross focal motor abnormalities   LYMPH: No cervical adenopathy palpated.   SKIN: No rashes, lesions.     Labs:  Lab Results   Component Value Date/Time    HBA1C 9.2 (H) 12/26/2019 08:39 AM        Lab Results   Component Value Date/Time    WBC 8.1 12/26/2019 08:39 AM    RBC 5.36 12/26/2019 08:39 AM    HEMOGLOBIN 16.0 12/26/2019 08:39 AM    MCV 88.2 12/26/2019 08:39 AM    MCH 29.9 12/26/2019 08:39 AM    MCHC 33.8 12/26/2019 08:39 AM    RDW 41.5 12/26/2019 08:39 AM    MPV 9.8 12/26/2019 08:39 AM       Lab Results   Component Value Date/Time    SODIUM 140 12/26/2019 08:39 AM    POTASSIUM 3.6 12/26/2019 08:39 AM    CHLORIDE 103 12/26/2019 08:39 AM    CO2 27 12/26/2019 08:39 AM    ANION 10.0 12/26/2019 08:39 AM    GLUCOSE 120 (H) 12/26/2019 08:39 AM    BUN 14 12/26/2019 08:39 AM    CREATININE 1.11 12/26/2019 08:39 AM    CALCIUM 8.7 12/26/2019 08:39 AM    ASTSGOT 14 12/26/2019 08:39 AM    ALTSGPT 17 12/26/2019 08:39 AM    TBILIRUBIN 0.6 12/26/2019 08:39 AM    ALBUMIN 3.9 12/26/2019 08:39 AM    TOTPROTEIN 7.2 12/26/2019 08:39 AM    GLOBULIN 3.3 12/26/2019 08:39 AM    AGRATIO 1.2 12/26/2019 08:39 AM       Lab Results   Component Value Date/Time    CHOLSTRLTOT 139 12/26/2019 0839    TRIGLYCERIDE 113 12/26/2019 0839    HDL 48 12/26/2019 0839    LDL 68 12/26/2019 0839       Lab Results   Component Value Date/Time    MALBCRT 1024 (H) 08/17/2018 11:21 AM    MICROALBUR 63.1 08/17/2018 11:21 AM        No results found for: TSHULTRASEN  No results found for: FREEDIR  No results found for: FREET3  No results found for: THYSTIMIG        ASSESSMENT/PLAN:     1. Type 2 diabetes mellitus with hyperglycemia, with long-term current use of insulin (HCC)  Uncontrolled secondary hyperglycemia  Advised patient that he should fill the prescriptions of his medications so we can find out what is  not covered and what is covered under his insurance plan  I am restarting him on Tresiba 30 units daily  Metformin 1000 mg twice a day  Jardiance 25 mg daily  And Ozempic 0.5 mg weekly  I want him to start checking sugars at least 3 times a day due to fluctuating glucose control  He should come back to see me again in a couple of weeks and let me know which medication is covered or not covered under his plan    If he truly has serious financial difficulty we have no choice but to replace his medication with premix insulin 70/30 ReliOn from [facility] which is cost effective but has substantial complications such as weight gain and hypoglycemia  We will plan for follow-up in 4 weeks  Eye exam was completed today    2. Hyperlipidemia associated with type 2 diabetes mellitus (HCC)  Well-controlled  Continue Crestor  Follow low-fat diet  Repeat fasting lipids after 3 to 6 months    3. Hypertension associated with diabetes (HCC)  Well-controlled  I am going to check a urine microalbumin creatinine ratio with his next labs    4. Long-term insulin use (HCC)  Patient is on long-term basal insulin therapy with other oral agents for type 2 diabetes management      Return in about 4 weeks (around 4/10/2020).      This patient during there office visit today was started on a new medication.  Side effects of the new medication were discussed with the patient today in the office.     Thank you kindly for allowing me to participate in the diabetes care plan for this patient.    Dewayne Figueroa MD, JONATHAN, ECNU  03/13/20    CC:   Sharath Pantoja M.D.

## 2020-03-13 NOTE — PROGRESS NOTES
Monofilament testing with a 10 gram force: sensation: decreased on left  Visual Inspection: Feet without maceration, ulcers, or fissures.  Pedal pulses: intact bilaterally

## 2020-03-20 ENCOUNTER — TELEPHONE (OUTPATIENT)
Dept: ENDOCRINOLOGY | Facility: MEDICAL CENTER | Age: 70
End: 2020-03-20

## 2020-03-20 NOTE — TELEPHONE ENCOUNTER
Phone Number Called: [phone]    Call outcome: Left detailed message for patient. Informed to call back with any additional questions.    Message: Contacted patient to let him know the results from the retinal eye exam came back with moderate retinalpathty Follow up recommended in 6 months.

## 2020-04-06 ENCOUNTER — PATIENT MESSAGE (OUTPATIENT)
Dept: ENDOCRINOLOGY | Facility: MEDICAL CENTER | Age: 70
End: 2020-04-06

## 2020-05-20 ENCOUNTER — TELEPHONE (OUTPATIENT)
Dept: ENDOCRINOLOGY | Facility: MEDICAL CENTER | Age: 70
End: 2020-05-20

## 2020-05-20 NOTE — TELEPHONE ENCOUNTER
VOICEMAIL  1. Caller Name: Carlos Pulido                        Call Back Number: [phone]      2. Message: Patient called because he would like a new script for the novofine 32g disposable needles for Tresiba. H would like for it to be sent to [facility] on [address]    3. Patient approves office to leave a detailed voicemail/MyChart message: yesReceived       request via: Patient    Was the patient seen in the last year in this department? Yes    Does the patient have an active prescription (recently filled or refills available) for medication(s) requested? No

## 2020-05-21 NOTE — TELEPHONE ENCOUNTER
Phone Number Called: [phone]    Call outcome: Left detailed message for patient. Informed to call back with any additional questions.    Message: Contacted patient and I let him know her know his request was done.

## 2020-06-01 NOTE — TELEPHONE ENCOUNTER
Received request via: Patient    Was the patient seen in the last year in this department? Yes    Does the patient have an active prescription (recently filled or refills available) for medication(s) requested? No     Insulin Pen Needle 32 G x 4 mm (NOVOFINE PLUS)             Si Applicator by Does not apply route every day. Using one needle tip with victoza per day    Disp:  100 Each    Refills:  3    Start: 2020    Class: Normal

## 2020-09-11 DIAGNOSIS — I10 ESSENTIAL HYPERTENSION: Chronic | ICD-10-CM

## 2020-09-11 RX ORDER — AMLODIPINE BESYLATE 5 MG/1
TABLET ORAL
Qty: 90 TAB | Refills: 0 | Status: SHIPPED | OUTPATIENT
Start: 2020-09-11 | End: 2020-12-31 | Stop reason: SDUPTHER

## 2020-09-15 ENCOUNTER — OFFICE VISIT (OUTPATIENT)
Dept: MEDICAL GROUP | Facility: PHYSICIAN GROUP | Age: 70
End: 2020-09-15
Payer: MEDICARE

## 2020-09-15 VITALS
DIASTOLIC BLOOD PRESSURE: 82 MMHG | OXYGEN SATURATION: 96 % | SYSTOLIC BLOOD PRESSURE: 146 MMHG | TEMPERATURE: 98.5 F | HEART RATE: 100 BPM | BODY MASS INDEX: 28.69 KG/M2 | RESPIRATION RATE: 16 BRPM | HEIGHT: 67 IN | WEIGHT: 182.8 LBS

## 2020-09-15 DIAGNOSIS — G89.29 CHRONIC PAIN OF MULTIPLE JOINTS: ICD-10-CM

## 2020-09-15 DIAGNOSIS — M25.50 CHRONIC PAIN OF MULTIPLE JOINTS: ICD-10-CM

## 2020-09-15 DIAGNOSIS — E78.2 MIXED HYPERLIPIDEMIA: ICD-10-CM

## 2020-09-15 DIAGNOSIS — Z79.4 TYPE 2 DIABETES MELLITUS WITH HYPERGLYCEMIA, WITH LONG-TERM CURRENT USE OF INSULIN (HCC): ICD-10-CM

## 2020-09-15 DIAGNOSIS — I10 ESSENTIAL HYPERTENSION: Chronic | ICD-10-CM

## 2020-09-15 DIAGNOSIS — E11.65 TYPE 2 DIABETES MELLITUS WITH HYPERGLYCEMIA, WITH LONG-TERM CURRENT USE OF INSULIN (HCC): ICD-10-CM

## 2020-09-15 PROCEDURE — 99214 OFFICE O/P EST MOD 30 MIN: CPT | Performed by: FAMILY MEDICINE

## 2020-09-15 RX ORDER — INSULIN DEGLUDEC 100 U/ML
30 INJECTION, SOLUTION SUBCUTANEOUS
Qty: 5 EACH | Refills: 5 | Status: SHIPPED | OUTPATIENT
Start: 2020-09-15 | End: 2020-12-31 | Stop reason: SDUPTHER

## 2020-09-15 RX ORDER — SEMAGLUTIDE 1.34 MG/ML
0.5 INJECTION, SOLUTION SUBCUTANEOUS
Qty: 1 EACH | Refills: 3 | Status: SHIPPED | OUTPATIENT
Start: 2020-09-15 | End: 2020-12-02

## 2020-09-15 RX ORDER — ATORVASTATIN CALCIUM 40 MG/1
TABLET, FILM COATED ORAL
Qty: 90 TAB | Refills: 0 | Status: SHIPPED | OUTPATIENT
Start: 2020-09-15 | End: 2020-12-31 | Stop reason: SDUPTHER

## 2020-09-15 RX ORDER — METFORMIN HYDROCHLORIDE 500 MG/1
1000 TABLET, EXTENDED RELEASE ORAL 2 TIMES DAILY
Qty: 120 TAB | Refills: 3 | Status: SHIPPED | OUTPATIENT
Start: 2020-09-15 | End: 2020-12-02 | Stop reason: SDUPTHER

## 2020-09-15 RX ORDER — LISINOPRIL 40 MG/1
40 TABLET ORAL DAILY
Qty: 90 TAB | Refills: 1 | Status: SHIPPED | OUTPATIENT
Start: 2020-09-15 | End: 2020-12-31 | Stop reason: SDUPTHER

## 2020-09-15 RX ORDER — EMPAGLIFLOZIN 25 MG/1
1 TABLET, FILM COATED ORAL DAILY
Qty: 30 TAB | Refills: 3 | Status: SHIPPED | OUTPATIENT
Start: 2020-09-15 | End: 2021-04-08 | Stop reason: SDUPTHER

## 2020-09-15 RX ORDER — MELOXICAM 15 MG/1
15 TABLET ORAL DAILY
Qty: 30 TAB | Refills: 2 | Status: SHIPPED | OUTPATIENT
Start: 2020-09-15 | End: 2021-10-04

## 2020-09-15 ASSESSMENT — FIBROSIS 4 INDEX: FIB4 SCORE: 0.95

## 2020-09-15 ASSESSMENT — PATIENT HEALTH QUESTIONNAIRE - PHQ9: CLINICAL INTERPRETATION OF PHQ2 SCORE: 0

## 2020-09-16 ASSESSMENT — ENCOUNTER SYMPTOMS
HEADACHES: 0
TINGLING: 0
EYES NEGATIVE: 1
DIZZINESS: 0
FEVER: 0
HEMOPTYSIS: 0
CHILLS: 0
DOUBLE VISION: 0
GASTROINTESTINAL NEGATIVE: 1
MYALGIAS: 0
COUGH: 0
CONSTITUTIONAL NEGATIVE: 1
PALPITATIONS: 0
DEPRESSION: 0
NAUSEA: 0
HEARTBURN: 0
PSYCHIATRIC NEGATIVE: 1
RESPIRATORY NEGATIVE: 1
BLURRED VISION: 0
BRUISES/BLEEDS EASILY: 0
CARDIOVASCULAR NEGATIVE: 1
NEUROLOGICAL NEGATIVE: 1

## 2020-09-16 NOTE — PROGRESS NOTES
Subjective:      Carlos Pulido is a 70 y.o. male who presents with [facility]            1. Chronic pain of multiple joints  The patient's current medical issue is well controlled on the current therapy with no new symptoms or worsening    - meloxicam (MOBIC) 15 MG tablet; Take 1 Tab by mouth every day.  Dispense: 30 Tab; Refill: 2    2. Essential hypertension  Currently treated for HTN, taking meds with no CP or sob, monitors bp at home periodically. controlled    - lisinopril (PRINIVIL) 40 MG tablet; Take 1 Tab by mouth every day.  Dispense: 90 Tab; Refill: 1    3. Mixed hyperlipidemia  Currently treated for HLD, taking meds with no new myalgias or joint pain, watching fats in diet  controlled    - atorvastatin (LIPITOR) 40 MG Tab; Take 1 tablet by mouth once daily  Dispense: 90 Tab; Refill: 0    4. Type 2 diabetes mellitus with hyperglycemia, with long-term current use of insulin (HCC)  Patient with numerous costly prescriptions that he has taken not regularly in the past several months due to cost. Before we switch him over to anything else we will see if our pharmacist can help him with the costs and stay on them  - REFERRAL TO PHARMACOTHERAPY SERVICE    Past Medical History:  4/16/2013: Anxiety      Comment:  As needed for anxiety  4/16/2013: Hypertension  4/16/2013: Low testosterone  12/18/2017: Meralgia paraesthetica, right  4/16/2013: PATTI (obstructive sleep apnea)      Comment:  Cannot tolerate sleep apnea  4/16/2013: Restless legs syndrome (RLS)  Past Surgical History:  2011: KNEE REPLACEMENT, TOTAL; Right      Comment:  done in [facility]  2009: KNEE REPLACEMENT, TOTAL; Left      Comment:  done in [facility]  No date: RHINOPLASTY  Social History    Tobacco Use      Smoking status: Never Smoker      Smokeless tobacco: Never Used    Alcohol use: Yes      Alcohol/week: 0.0 oz      Comment: x2 drinks monthly    Drug use: Yes      Types: Marijuana      Comment: occasional     Review of  patient's family history indicates:  Problem: Diabetes      Relation: Mother          Age of Onset: (Not Specified)  Problem: Diabetes      Relation: Father          Age of Onset: (Not Specified)  Problem: Diabetes      Relation: Sister          Age of Onset: (Not Specified)  Problem: Diabetes      Relation: Brother          Age of Onset: (Not Specified)  Problem: No Known Problems      Relation: Brother          Age of Onset: (Not Specified)  Problem: Heart Disease      Relation: Neg Hx          Age of Onset: (Not Specified)  Problem: Heart Failure      Relation: Neg Hx          Age of Onset: (Not Specified)      Current Outpatient Medications: •  Semaglutide,0.25 or 0.5MG/DOS, (OZEMPIC, 0.25 OR 0.5 MG/DOSE,) 2 MG/1.5ML Solution Pen-injector, Inject 0.5 mg as instructed every 7 days., Disp: 1 Each, Rfl: 3•  metFORMIN ER (GLUCOPHAGE XR) 500 MG TABLET SR 24 HR, Take 2 Tabs by mouth 2 times a day., Disp: 120 Tab, Rfl: 3•  meloxicam (MOBIC) 15 MG tablet, Take 1 Tab by mouth every day., Disp: 30 Tab, Rfl: 2•  lisinopril (PRINIVIL) 40 MG tablet, Take 1 Tab by mouth every day., Disp: 90 Tab, Rfl: 1•  Insulin Degludec (TRESIBA FLEXTOUCH) 100 UNIT/ML Solution Pen-injector, Inject 30 Units as instructed every bedtime., Disp: 5 Each, Rfl: 5•  Empagliflozin (JARDIANCE) 25 MG Tab, Take 1 tablet by mouth every day., Disp: 30 Tab, Rfl: 3•  atorvastatin (LIPITOR) 40 MG Tab, Take 1 tablet by mouth once daily, Disp: 90 Tab, Rfl: 0•  amLODIPine (NORVASC) 5 MG Tab, Take 1 tablet by mouth once daily, Disp: 90 Tab, Rfl: 0•  hydroCHLOROthiazide (HYDRODIURIL) 25 MG Tab, Take 1 tablet by mouth once daily, Disp: 90 Tab, Rfl: 0•  ACCU-CHEK SOFTCLIX LANCETS Misc, USE TO CHECK BLOOD SUGAR LEVELS 5 TIMES DAILY  **DX CODE E11.65**, Disp: 500 Each, Rfl: 1•  ACCU-CHEK AMY PLUS strip, USE 1 STRIP TO CHECK GLUCOSE 4 TIMES DAILY **E11.65**, Disp: 150 Strip, Rfl: 0•  Insulin Pen Needle 32 G x 4 mm (NOVOFINE PLUS), 1 Applicator by Does not apply  "route every day. Using one needle tip with victoza per day, Disp: 100 Each, Rfl: 3•  insulin NPH (NOVOLIN N RELION) 100 UNIT/ML Suspension, Inject 32 Units as instructed 2 Times a Day., Disp: 20 mL, Rfl: 6•  insulin regular (NOVOLIN R RELION) 100 Unit/mL Solution, Inject 16 Units as instructed 3 times a day before meals., Disp: 20 mL, Rfl: 6•  lidocaine (LIDODERM) 5 % Patch, Apply 1 Patch to skin as directed every 24 hours. as needed for nerve pain., Disp: 30 Patch, Rfl: 2•  RELION INSULIN SYRINGE 31G X 15/64\" 0.3 ML Misc, Inject 1 Each as instructed 5 Times a Day., Disp: 150 Each, Rfl: 6•  PARoxetine (PAXIL) 20 MG Tab, TAKE 1 TABLET BY MOUTH ONCE DAILY, Disp: 100 Tab, Rfl: 1•  Diclofenac Sodium (VOLTAREN) 1 % Gel, Apply 4 g to skin as directed 3 times a day as needed., Disp: 1 Tube, Rfl: 0•  aspirin 81 MG tablet, Take 81 mg by mouth every day., Disp: , Rfl:     Patient was instructed on the use of medications, either prescriptions or OTC and informed on when the appropriate follow up time period should be. In addition, patient was also instructed that should any acute worsening occur that they should notify this clinic asap or call 911.          Review of Systems   Constitutional: Negative.  Negative for chills and fever.   HENT: Negative.  Negative for hearing loss.    Eyes: Negative.  Negative for blurred vision and double vision.   Respiratory: Negative.  Negative for cough and hemoptysis.    Cardiovascular: Negative.  Negative for chest pain and palpitations.   Gastrointestinal: Negative.  Negative for heartburn and nausea.   Genitourinary: Negative.  Negative for dysuria.   Musculoskeletal: Positive for joint pain. Negative for myalgias.   Skin: Negative.  Negative for rash.   Neurological: Negative.  Negative for dizziness, tingling and headaches.   Endo/Heme/Allergies: Negative.  Does not bruise/bleed easily.   Psychiatric/Behavioral: Negative.  Negative for depression and suicidal ideas.   All other systems " "reviewed and are negative.         Objective:     /82 (BP Location: Left arm, Patient Position: Sitting, BP Cuff Size: Adult)   Pulse 100   Temp 36.9 °C (98.5 °F) (Temporal)   Resp 16   Ht 1.702 m (5' 7\")   Wt 82.9 kg (182 lb 12.8 oz)   SpO2 96%   BMI 28.63 kg/m²      Physical Exam  Vitals signs and nursing note reviewed.   Constitutional:       General: He is not in acute distress.     Appearance: He is well-developed. He is not diaphoretic.   HENT:      Head: Normocephalic and atraumatic.      Mouth/Throat:      Pharynx: No oropharyngeal exudate.   Eyes:      Pupils: Pupils are equal, round, and reactive to light.   Cardiovascular:      Rate and Rhythm: Normal rate and regular rhythm.      Heart sounds: Normal heart sounds. No murmur. No friction rub. No gallop.    Pulmonary:      Effort: Pulmonary effort is normal. No respiratory distress.      Breath sounds: Normal breath sounds. No wheezing or rales.   Chest:      Chest wall: No tenderness.   Neurological:      Mental Status: He is alert and oriented to person, place, and time.   Psychiatric:         Behavior: Behavior normal.         Thought Content: Thought content normal.         Judgment: Judgment normal.                 Assessment/Plan:        1. Chronic pain of multiple joints    - meloxicam (MOBIC) 15 MG tablet; Take 1 Tab by mouth every day.  Dispense: 30 Tab; Refill: 2    2. Essential hypertension    - lisinopril (PRINIVIL) 40 MG tablet; Take 1 Tab by mouth every day.  Dispense: 90 Tab; Refill: 1    3. Mixed hyperlipidemia    - atorvastatin (LIPITOR) 40 MG Tab; Take 1 tablet by mouth once daily  Dispense: 90 Tab; Refill: 0    4. Type 2 diabetes mellitus with hyperglycemia, with long-term current use of insulin (HCC)  - REFERRAL TO PHARMACOTHERAPY SERVICE    "

## 2020-09-23 ENCOUNTER — ANTICOAGULATION MONITORING (OUTPATIENT)
Dept: MEDICAL GROUP | Facility: PHYSICIAN GROUP | Age: 70
End: 2020-09-23

## 2020-09-23 ENCOUNTER — NON-PROVIDER VISIT (OUTPATIENT)
Dept: MEDICAL GROUP | Facility: PHYSICIAN GROUP | Age: 70
End: 2020-09-23
Payer: MEDICARE

## 2020-09-23 DIAGNOSIS — I10 ESSENTIAL HYPERTENSION: Chronic | ICD-10-CM

## 2020-09-23 DIAGNOSIS — E11.9 TYPE 2 DIABETES MELLITUS WITHOUT COMPLICATION, WITHOUT LONG-TERM CURRENT USE OF INSULIN (HCC): ICD-10-CM

## 2020-09-23 LAB
GLUCOSE BLD-MCNC: 190 MG/DL (ref 70–100)
HBA1C MFR BLD: 8.7 % (ref 0–5.6)
INT CON NEG: ABNORMAL
INT CON POS: ABNORMAL

## 2020-09-23 PROCEDURE — 99999 PR NO CHARGE: CPT | Performed by: FAMILY MEDICINE

## 2020-09-23 PROCEDURE — 83036 HEMOGLOBIN GLYCOSYLATED A1C: CPT | Performed by: FAMILY MEDICINE

## 2020-09-23 PROCEDURE — 82962 GLUCOSE BLOOD TEST: CPT | Performed by: FAMILY MEDICINE

## 2020-09-23 RX ORDER — HYDROCHLOROTHIAZIDE 25 MG/1
TABLET ORAL
Qty: 90 TAB | Refills: 0 | Status: SHIPPED | OUTPATIENT
Start: 2020-09-23 | End: 2020-12-31 | Stop reason: SDUPTHER

## 2020-09-23 NOTE — NON-PROVIDER
New Patient Consult Note  Primary care physician: Jaylen Finnegan M.D.  Start time: 1:09  End time: 1:19    Reason for consult: Management of Uncontrolled Type 2 Diabetes    HPI:  Carlos Pulido is a 70 y.o. old patient who comes in today for evaluation of above stated problem.    Most Recent HbA1c:   Lab Results   Component Value Date/Time    HBA1C 8.7 (A) 09/23/2020 01:34 PM        Current Diabetes Regimen:  GLP-1 Agent: Ozempic 0.5mg sub q wednesdays   SGLT-2 Inhibitor:  Empagliflozin 25 mg once daily   Metformin 1000mg po bid   Basal Insulin: Tresiba(Insulin degludec) 30 units sc once daily     Other times:  Hypoglycemia:  None      Past Medical History:  Patient Active Problem List    Diagnosis Date Noted   • Type 2 diabetes mellitus with hyperglycemia, with long-term current use of insulin ([facility]) 03/13/2020   • Hyperlipidemia associated with type 2 diabetes mellitus ([facility]) 03/13/2020   • Long-term insulin use ([facility]) 03/13/2020   • Muscle cramps 09/10/2019   • Chronic pain of multiple joints 06/18/2019   • Hx of seasonal allergies 03/19/2019   • Chronic low back pain 11/27/2018   • Constipation 07/13/2018   • Chronic pain of both knees 12/18/2017   • Uncontrolled type 2 diabetes mellitus with insulin therapy ([facility]) 03/23/2017   • Non compliance w medication regimen 03/23/2017   • Obesity (BMI 30.0-34.9) 09/23/2015   • Chronic insomnia 07/06/2015   • Hypertension associated with diabetes ([facility]) 04/16/2013   • Restless legs syndrome (RLS) 04/16/2013   • PATTI (obstructive sleep apnea) 04/16/2013   • Low testosterone 04/16/2013   • Anxiety 04/16/2013       Past Surgical History:  Past Surgical History:   Procedure Laterality Date   • KNEE REPLACEMENT, TOTAL Right 2011    done in [facility]   • KNEE REPLACEMENT, TOTAL Left 2009    done in [facility]   • RHINOPLASTY         Allergies:  Amoxicillin, Hydrocodone, and Mirapex [pramipexole]    Social History:  Social History     Socioeconomic History   •  Marital status:      Spouse name: Not on file   • Number of children: Not on file   • Years of education: Not on file   • Highest education level: Not on file   Occupational History   • Not on file   Social Needs   • Financial resource strain: Not on file   • Food insecurity     Worry: Not on file     Inability: Not on file   • Transportation needs     Medical: Not on file     Non-medical: Not on file   Tobacco Use   • Smoking status: Never Smoker   • Smokeless tobacco: Never Used   Substance and Sexual Activity   • Alcohol use: Yes     Alcohol/week: 0.0 oz     Comment: x2 drinks monthly   • Drug use: Yes     Types: Marijuana     Comment: occasional    • Sexual activity: Yes     Partners: Female     Comment:    Lifestyle   • Physical activity     Days per week: Not on file     Minutes per session: Not on file   • Stress: Not on file   Relationships   • Social connections     Talks on phone: Not on file     Gets together: Not on file     Attends [employer] service: Not on file     Active member of club or organization: Not on file     Attends meetings of clubs or organizations: Not on file     Relationship status: Not on file   • Intimate partner violence     Fear of current or ex partner: Not on file     Emotionally abused: Not on file     Physically abused: Not on file     Forced sexual activity: Not on file   Other Topics Concern   •  Service No   • Blood Transfusions No   • Caffeine Concern No   • Occupational Exposure Yes     Comment: asbestos    • Hobby Hazards No   • Sleep Concern Yes   • Stress Concern No   • Weight Concern Yes   • Special Diet Yes     Comment: less carbs   • Back Care Yes   • Exercise Yes   • Bike Helmet No     Comment: does not ride bike    • Seat Belt Yes   • Self-Exams Yes   Social History Narrative    Retired from construction (pipe line work)       Family History:  Family History   Problem Relation Age of Onset   • Diabetes Mother    • Diabetes Father    • Diabetes  "Sister    • Diabetes Brother    • No Known Problems Brother    • Heart Disease Neg Hx    • Heart Failure Neg Hx        Medications:    Current Outpatient Medications:   •  hydroCHLOROthiazide (HYDRODIURIL) 25 MG Tab, Take 1 tablet by mouth once daily, Disp: 90 Tab, Rfl: 0  •  Semaglutide,0.25 or 0.5MG/DOS, (OZEMPIC, 0.25 OR 0.5 MG/DOSE,) 2 MG/1.5ML Solution Pen-injector, Inject 0.5 mg as instructed every 7 days., Disp: 1 Each, Rfl: 3  •  metFORMIN ER (GLUCOPHAGE XR) 500 MG TABLET SR 24 HR, Take 2 Tabs by mouth 2 times a day., Disp: 120 Tab, Rfl: 3  •  meloxicam (MOBIC) 15 MG tablet, Take 1 Tab by mouth every day., Disp: 30 Tab, Rfl: 2  •  lisinopril (PRINIVIL) 40 MG tablet, Take 1 Tab by mouth every day., Disp: 90 Tab, Rfl: 1  •  Insulin Degludec (TRESIBA FLEXTOUCH) 100 UNIT/ML Solution Pen-injector, Inject 30 Units as instructed every bedtime., Disp: 5 Each, Rfl: 5  •  Empagliflozin (JARDIANCE) 25 MG Tab, Take 1 tablet by mouth every day., Disp: 30 Tab, Rfl: 3  •  atorvastatin (LIPITOR) 40 MG Tab, Take 1 tablet by mouth once daily, Disp: 90 Tab, Rfl: 0  •  amLODIPine (NORVASC) 5 MG Tab, Take 1 tablet by mouth once daily, Disp: 90 Tab, Rfl: 0  •  Insulin Pen Needle 32 G x 4 mm (NOVOFINE PLUS), 1 Applicator by Does not apply route every day. Using one needle tip with victoza per day, Disp: 100 Each, Rfl: 3  •  insulin NPH (NOVOLIN N RELION) 100 UNIT/ML Suspension, Inject 32 Units as instructed 2 Times a Day., Disp: 20 mL, Rfl: 6  •  insulin regular (NOVOLIN R RELION) 100 Unit/mL Solution, Inject 16 Units as instructed 3 times a day before meals., Disp: 20 mL, Rfl: 6  •  lidocaine (LIDODERM) 5 % Patch, Apply 1 Patch to skin as directed every 24 hours. as needed for nerve pain., Disp: 30 Patch, Rfl: 2  •  RELION INSULIN SYRINGE 31G X 15/64\" 0.3 ML Misc, Inject 1 Each as instructed 5 Times a Day., Disp: 150 Each, Rfl: 6  •  PARoxetine (PAXIL) 20 MG Tab, TAKE 1 TABLET BY MOUTH ONCE DAILY, Disp: 100 Tab, Rfl: 1  •  " ACCU-CHEK SOFTCLIX LANCETS Misc, USE TO CHECK BLOOD SUGAR LEVELS 5 TIMES DAILY  **DX CODE E11.65**, Disp: 500 Each, Rfl: 1  •  ACCU-CHEK AMY PLUS strip, USE 1 STRIP TO CHECK GLUCOSE 4 TIMES DAILY **E11.65**, Disp: 150 Strip, Rfl: 0  •  Diclofenac Sodium (VOLTAREN) 1 % Gel, Apply 4 g to skin as directed 3 times a day as needed., Disp: 1 Tube, Rfl: 0  •  aspirin 81 MG tablet, Take 81 mg by mouth every day., Disp: , Rfl:         Assessment and Plan:  Pt has been tolerating Ozempic 0.5mg sub q weekly on wednesdays, however he has been unable to afford it as he is in the donut hole.  Pt has been tolerating Jardiance 25mg po daily, however he has been unable to afford it due to being in the donut hole  Pt is currently optimized on metformin 1000mg po bid   Pt has NOT been meeting his exercise goal of 30 minutes daily.   Long discussion about lifestyle changes.  Pt has been having several coffees WITH SUGAR, REGULAR COKE, COOKIES WITH HIS COFFEE   Return in about 4 weeks (around 10/21/2020).       Thank you for allowing me to participate in the care of this patient.    Chrissy Bethea  09/23/20    CC:   Jaylen Finnegan M.D.    This note was created using voice recognition software (Dragon). The accuracy of the dictation is limited by the abilities of the software. I have reviewed the note prior to signing, however some errors in grammar and context are still possible. If you have any questions related to this note please do not hesitate to contact our office.

## 2020-09-23 NOTE — PATIENT INSTRUCTIONS
1. [facility] with the [facility] in [place] to get help picking the right medicare part D plan, and/or medigap plan    2. Continue to take metformin 1000mg twice daily  3. Continue to take Jardiance 25mg daily in the morning  4. Continue Ozempic 0.5mg sub q weekly on Wednesdays.  5. Avoid WHITE foods, like bread, rice, tortillas, chips, crackers, cereal.  6. Eat as much fresh fruit and vegetables as you like.  7.  NO sugar sweetened drinks, try the green packets    [facility] [place], [place]  Address: [address] [place] [address], [place] [address]  Hours:   Open Closes 4:30PM  Phone: [phone]

## 2020-10-10 ENCOUNTER — HOSPITAL ENCOUNTER (OUTPATIENT)
Facility: MEDICAL CENTER | Age: 70
End: 2020-10-10
Attending: PHYSICIAN ASSISTANT
Payer: MEDICARE

## 2020-10-10 ENCOUNTER — OFFICE VISIT (OUTPATIENT)
Dept: URGENT CARE | Facility: CLINIC | Age: 70
End: 2020-10-10
Payer: MEDICARE

## 2020-10-10 VITALS
HEART RATE: 91 BPM | HEIGHT: 67 IN | RESPIRATION RATE: 16 BRPM | WEIGHT: 177.7 LBS | SYSTOLIC BLOOD PRESSURE: 142 MMHG | BODY MASS INDEX: 27.89 KG/M2 | DIASTOLIC BLOOD PRESSURE: 78 MMHG | OXYGEN SATURATION: 98 % | TEMPERATURE: 97.3 F

## 2020-10-10 DIAGNOSIS — R31.9 HEMATURIA, UNSPECIFIED TYPE: ICD-10-CM

## 2020-10-10 LAB
APPEARANCE UR: CLEAR
BILIRUB UR STRIP-MCNC: NEGATIVE MG/DL
COLOR UR AUTO: YELLOW
GLUCOSE UR STRIP.AUTO-MCNC: >=1000 MG/DL
KETONES UR STRIP.AUTO-MCNC: NEGATIVE MG/DL
LEUKOCYTE ESTERASE UR QL STRIP.AUTO: NEGATIVE
NITRITE UR QL STRIP.AUTO: NEGATIVE
PH UR STRIP.AUTO: 5.5 [PH] (ref 5–8)
PROT UR QL STRIP: 100 MG/DL
RBC UR QL AUTO: NORMAL
SP GR UR STRIP.AUTO: 1.01
UROBILINOGEN UR STRIP-MCNC: 0.2 MG/DL

## 2020-10-10 PROCEDURE — 81002 URINALYSIS NONAUTO W/O SCOPE: CPT | Performed by: PHYSICIAN ASSISTANT

## 2020-10-10 PROCEDURE — 87086 URINE CULTURE/COLONY COUNT: CPT

## 2020-10-10 PROCEDURE — 99214 OFFICE O/P EST MOD 30 MIN: CPT | Performed by: PHYSICIAN ASSISTANT

## 2020-10-10 ASSESSMENT — ENCOUNTER SYMPTOMS
NAUSEA: 0
COUGH: 0
CHILLS: 0
CONSTIPATION: 0
VOMITING: 0
ABDOMINAL PAIN: 0
DIARRHEA: 0
FLANK PAIN: 0
FEVER: 0
SHORTNESS OF BREATH: 0

## 2020-10-10 ASSESSMENT — FIBROSIS 4 INDEX: FIB4 SCORE: 0.95

## 2020-10-10 NOTE — PROGRESS NOTES
Subjective:   Carlos Pulido is a 70 y.o. male who presents for Other (blood in urine x 2 week; )        HPI     Patient presents to urgent care today with intermittent blood in his urine for the past 2 weeks.  He noticed urine has been slightly tinged.  Symptoms resolved spontaneously.  He has not had any burning with urination, increased frequency, hesitancy or urgency.  He notes frequent urinations normally due to type 2 diabetes.  He is unsure if symptoms are related to him exerting himself with yard work 2 days ago.  He denies history of kidney stone but does have a family history both brother and father have had kidney stones.  He denies abdominal pain, flank pain.  No nausea or vomiting.  No fever or chills.    He had a similar episode 4 years ago and was evaluated by urology.  A cystoscopy was completed without any abnormal findings.    Review of Systems   Constitutional: Negative for chills, fever and malaise/fatigue.   Respiratory: Negative for cough and shortness of breath.    Gastrointestinal: Negative for abdominal pain, constipation, diarrhea, nausea and vomiting.   Genitourinary: Positive for hematuria. Negative for dysuria, flank pain, frequency and urgency.   All other systems reviewed and are negative.      PMH:  has a past medical history of Anxiety (4/16/2013), Hypertension (4/16/2013), Low testosterone (4/16/2013), Meralgia paraesthetica, right (12/18/2017), PATTI (obstructive sleep apnea) (4/16/2013), and Restless legs syndrome (RLS) (4/16/2013).  MEDS:   Current Outpatient Medications:   •  hydroCHLOROthiazide (HYDRODIURIL) 25 MG Tab, Take 1 tablet by mouth once daily, Disp: 90 Tab, Rfl: 0  •  Semaglutide,0.25 or 0.5MG/DOS, (OZEMPIC, 0.25 OR 0.5 MG/DOSE,) 2 MG/1.5ML Solution Pen-injector, Inject 0.5 mg as instructed every 7 days., Disp: 1 Each, Rfl: 3  •  metFORMIN ER (GLUCOPHAGE XR) 500 MG TABLET SR 24 HR, Take 2 Tabs by mouth 2 times a day., Disp: 120 Tab, Rfl: 3  •  meloxicam (MOBIC) 15 MG  "tablet, Take 1 Tab by mouth every day., Disp: 30 Tab, Rfl: 2  •  lisinopril (PRINIVIL) 40 MG tablet, Take 1 Tab by mouth every day., Disp: 90 Tab, Rfl: 1  •  Insulin Degludec (TRESIBA FLEXTOUCH) 100 UNIT/ML Solution Pen-injector, Inject 30 Units as instructed every bedtime., Disp: 5 Each, Rfl: 5  •  Empagliflozin (JARDIANCE) 25 MG Tab, Take 1 tablet by mouth every day., Disp: 30 Tab, Rfl: 3  •  atorvastatin (LIPITOR) 40 MG Tab, Take 1 tablet by mouth once daily, Disp: 90 Tab, Rfl: 0  •  amLODIPine (NORVASC) 5 MG Tab, Take 1 tablet by mouth once daily, Disp: 90 Tab, Rfl: 0  •  ACCU-CHEK SOFTCLIX LANCETS Misc, USE TO CHECK BLOOD SUGAR LEVELS 5 TIMES DAILY  **DX CODE E11.65**, Disp: 500 Each, Rfl: 1  •  ACCU-CHEK AMY PLUS strip, USE 1 STRIP TO CHECK GLUCOSE 4 TIMES DAILY **E11.65**, Disp: 150 Strip, Rfl: 0  •  aspirin 81 MG tablet, Take 81 mg by mouth every day., Disp: , Rfl:   ALLERGIES:   Allergies   Allergen Reactions   • Amoxicillin    • Hydrocodone      SURGHX:   Past Surgical History:   Procedure Laterality Date   • KNEE REPLACEMENT, TOTAL Right 2011    done in [facility]   • KNEE REPLACEMENT, TOTAL Left 2009    done in [facility]   • RHINOPLASTY       SOCHX:  reports that he has never smoked. He has never used smokeless tobacco. He reports current alcohol use. He reports current drug use. Drug: Marijuana.  Family History   Problem Relation Age of Onset   • Diabetes Mother    • Diabetes Father    • Diabetes Sister    • Diabetes Brother    • No Known Problems Brother    • Heart Disease Neg Hx    • Heart Failure Neg Hx         Objective:   /78 (BP Location: Left arm, Patient Position: Sitting)   Pulse 91   Temp 36.3 °C (97.3 °F) (Temporal)   Resp 16   Ht 1.702 m (5' 7\")   Wt 80.6 kg (177 lb 11.2 oz)   SpO2 98%   BMI 27.83 kg/m²     Physical Exam  Vitals signs reviewed.   Constitutional:       General: He is not in acute distress.     Appearance: He is well-developed.   HENT:      " Head: Normocephalic and atraumatic.      Right Ear: External ear normal.      Left Ear: External ear normal.      Nose: Nose normal.      Mouth/Throat:      Mouth: Mucous membranes are moist.   Eyes:      Conjunctiva/sclera: Conjunctivae normal.      Pupils: Pupils are equal, round, and reactive to light.   Neck:      Musculoskeletal: Normal range of motion and neck supple.      Trachea: No tracheal deviation.   Cardiovascular:      Rate and Rhythm: Normal rate and regular rhythm.   Pulmonary:      Effort: Pulmonary effort is normal.      Breath sounds: Normal breath sounds.   Abdominal:      General: There is no distension.      Tenderness: There is no abdominal tenderness. There is no right CVA tenderness or left CVA tenderness.   Skin:     General: Skin is warm and dry.      Capillary Refill: Capillary refill takes less than 2 seconds.   Neurological:      General: No focal deficit present.      Mental Status: He is alert and oriented to person, place, and time.   Psychiatric:         Mood and Affect: Mood normal.         Behavior: Behavior normal.       Lab Results   Component Value Date/Time    POCCOLOR Yellow 10/10/2020 09:38 AM    POCAPPEAR Clear 10/10/2020 09:38 AM    POCLEUKEST Negative 10/10/2020 09:38 AM    POCNITRITE Negative 10/10/2020 09:38 AM    POCUROBILIGE 0.2 10/10/2020 09:38 AM    POCPROTEIN 100 10/10/2020 09:38 AM    POCURPH 5.5 10/10/2020 09:38 AM    POCBLOOD Trace-Intact 10/10/2020 09:38 AM    POCSPGRV 1.015 10/10/2020 09:38 AM    POCKETONES Negative 10/10/2020 09:38 AM    POCBILIRUBIN Negative 10/10/2020 09:38 AM    POCGLUCUA >=1,000 10/10/2020 09:38 AM            Assessment/Plan:     1. Hematuria, unspecified type  POCT Urinalysis    URINE CULTURE(NEW)    REFERRAL TO FOLLOW-UP WITH PRIMARY CARE     Continue to monitor symptoms closely.  He will be notified of final urine culture results.  A referral was placed to follow-up with his primary care within 7-10 days.  We will defer antibiotic  treatment until confirmed with urine culture.    If symptoms worsen or persist patient can return to clinic for reevaluation. Patient confirmed understanding of information.    Please note that this dictation was created using voice recognition software. I have made every reasonable attempt to correct obvious errors, but I expect that there are errors of grammar and possibly content that I did not discover before finalizing the note.

## 2020-10-12 LAB
BACTERIA UR CULT: NORMAL
SIGNIFICANT IND 70042: NORMAL
SITE SITE: NORMAL
SOURCE SOURCE: NORMAL

## 2020-10-19 ENCOUNTER — OFFICE VISIT (OUTPATIENT)
Dept: MEDICAL GROUP | Facility: PHYSICIAN GROUP | Age: 70
End: 2020-10-19
Payer: MEDICARE

## 2020-10-19 VITALS
OXYGEN SATURATION: 98 % | RESPIRATION RATE: 20 BRPM | TEMPERATURE: 97.5 F | WEIGHT: 177.8 LBS | DIASTOLIC BLOOD PRESSURE: 70 MMHG | HEART RATE: 99 BPM | BODY MASS INDEX: 27.91 KG/M2 | SYSTOLIC BLOOD PRESSURE: 128 MMHG | HEIGHT: 67 IN

## 2020-10-19 DIAGNOSIS — R31.9 HEMATURIA, UNSPECIFIED TYPE: ICD-10-CM

## 2020-10-19 PROBLEM — Z79.4 LONG-TERM INSULIN USE (HCC): Status: RESOLVED | Noted: 2020-03-13 | Resolved: 2020-10-19

## 2020-10-19 PROBLEM — G89.29 CHRONIC PAIN OF BOTH KNEES: Status: RESOLVED | Noted: 2017-12-18 | Resolved: 2020-10-19

## 2020-10-19 PROBLEM — Z91.148 NON COMPLIANCE W MEDICATION REGIMEN: Status: RESOLVED | Noted: 2017-03-23 | Resolved: 2020-10-19

## 2020-10-19 PROBLEM — M25.561 CHRONIC PAIN OF BOTH KNEES: Status: RESOLVED | Noted: 2017-12-18 | Resolved: 2020-10-19

## 2020-10-19 PROBLEM — G89.29 CHRONIC LOW BACK PAIN: Status: RESOLVED | Noted: 2018-11-27 | Resolved: 2020-10-19

## 2020-10-19 PROBLEM — G89.29 CHRONIC PAIN OF MULTIPLE JOINTS: Status: RESOLVED | Noted: 2019-06-18 | Resolved: 2020-10-19

## 2020-10-19 PROBLEM — M54.50 CHRONIC LOW BACK PAIN: Status: RESOLVED | Noted: 2018-11-27 | Resolved: 2020-10-19

## 2020-10-19 PROBLEM — M25.50 CHRONIC PAIN OF MULTIPLE JOINTS: Status: RESOLVED | Noted: 2019-06-18 | Resolved: 2020-10-19

## 2020-10-19 PROBLEM — E11.69 HYPERLIPIDEMIA ASSOCIATED WITH TYPE 2 DIABETES MELLITUS (HCC): Status: RESOLVED | Noted: 2020-03-13 | Resolved: 2020-10-19

## 2020-10-19 PROBLEM — Z88.9 HX OF SEASONAL ALLERGIES: Status: RESOLVED | Noted: 2019-03-19 | Resolved: 2020-10-19

## 2020-10-19 PROBLEM — M25.562 CHRONIC PAIN OF BOTH KNEES: Status: RESOLVED | Noted: 2017-12-18 | Resolved: 2020-10-19

## 2020-10-19 PROBLEM — K59.00 CONSTIPATION: Status: RESOLVED | Noted: 2018-07-13 | Resolved: 2020-10-19

## 2020-10-19 PROBLEM — E78.5 HYPERLIPIDEMIA ASSOCIATED WITH TYPE 2 DIABETES MELLITUS (HCC): Status: RESOLVED | Noted: 2020-03-13 | Resolved: 2020-10-19

## 2020-10-19 LAB
APPEARANCE UR: CLEAR
BILIRUB UR STRIP-MCNC: NEGATIVE MG/DL
COLOR UR AUTO: YELLOW
GLUCOSE UR STRIP.AUTO-MCNC: >=1000 MG/DL
KETONES UR STRIP.AUTO-MCNC: NEGATIVE MG/DL
LEUKOCYTE ESTERASE UR QL STRIP.AUTO: NEGATIVE
NITRITE UR QL STRIP.AUTO: NEGATIVE
PH UR STRIP.AUTO: 5.5 [PH] (ref 5–8)
PROT UR QL STRIP: >=300 MG/DL
RBC UR QL AUTO: NORMAL
SP GR UR STRIP.AUTO: 1.01
UROBILINOGEN UR STRIP-MCNC: 0.2 MG/DL

## 2020-10-19 PROCEDURE — 81002 URINALYSIS NONAUTO W/O SCOPE: CPT | Performed by: FAMILY MEDICINE

## 2020-10-19 PROCEDURE — 99214 OFFICE O/P EST MOD 30 MIN: CPT | Performed by: FAMILY MEDICINE

## 2020-10-19 SDOH — HEALTH STABILITY: MENTAL HEALTH: HOW OFTEN DO YOU HAVE A DRINK CONTAINING ALCOHOL?: 2-4 TIMES A MONTH

## 2020-10-19 ASSESSMENT — FIBROSIS 4 INDEX: FIB4 SCORE: 0.95

## 2020-10-19 NOTE — PROGRESS NOTES
Over 50% of this 25 minute visit ( all time was face to face) was spent on counseling and coordination of care for the problem of  1. Hematuria, unspecified type  Painless hematuria for past week. [facility]en in [facility] and hematuria again present today  Strong family history of kidney stones in dad and brothers  Will get ct renal and refer to urology  - POCT Urinalysis  - CT-RENAL COLIC EVALUATION(A/P W/O); Future  - REFERRAL TO UROLOGY    Past Medical History:   Diagnosis Date   • Anxiety 4/16/2013    As needed for anxiety   • Hypertension 4/16/2013   • Low testosterone 4/16/2013   • Meralgia paraesthetica, right 12/18/2017   • PATTI (obstructive sleep apnea) 4/16/2013    Cannot tolerate sleep apnea   • Restless legs syndrome (RLS) 4/16/2013     Past Surgical History:   Procedure Laterality Date   • KNEE REPLACEMENT, TOTAL Right 2011    done in [facility]   • KNEE REPLACEMENT, TOTAL Left 2009    done in [facility]   • RHINOPLASTY       Social History     Tobacco Use   • Smoking status: Never Smoker   • Smokeless tobacco: Never Used   Substance Use Topics   • Alcohol use: Yes     Alcohol/week: 0.0 oz     Frequency: 2-4 times a month     Comment: x2 drinks monthly   • Drug use: Yes     Types: Marijuana     Comment: occasional      Family History   Problem Relation Age of Onset   • Diabetes Mother    • Diabetes Father    • Diabetes Sister    • Diabetes Brother    • No Known Problems Brother    • Heart Disease Neg Hx    • Heart Failure Neg Hx          Current Outpatient Medications:   •  hydroCHLOROthiazide (HYDRODIURIL) 25 MG Tab, Take 1 tablet by mouth once daily, Disp: 90 Tab, Rfl: 0  •  Semaglutide,0.25 or 0.5MG/DOS, (OZEMPIC, 0.25 OR 0.5 MG/DOSE,) 2 MG/1.5ML Solution Pen-injector, Inject 0.5 mg as instructed every 7 days., Disp: 1 Each, Rfl: 3  •  metFORMIN ER (GLUCOPHAGE XR) 500 MG TABLET SR 24 HR, Take 2 Tabs by mouth 2 times a day., Disp: 120 Tab, Rfl: 3  •  meloxicam (MOBIC) 15 MG tablet, Take 1 Tab by  mouth every day., Disp: 30 Tab, Rfl: 2  •  lisinopril (PRINIVIL) 40 MG tablet, Take 1 Tab by mouth every day., Disp: 90 Tab, Rfl: 1  •  Insulin Degludec (TRESIBA FLEXTOUCH) 100 UNIT/ML Solution Pen-injector, Inject 30 Units as instructed every bedtime., Disp: 5 Each, Rfl: 5  •  Empagliflozin (JARDIANCE) 25 MG Tab, Take 1 tablet by mouth every day., Disp: 30 Tab, Rfl: 3  •  atorvastatin (LIPITOR) 40 MG Tab, Take 1 tablet by mouth once daily, Disp: 90 Tab, Rfl: 0  •  amLODIPine (NORVASC) 5 MG Tab, Take 1 tablet by mouth once daily, Disp: 90 Tab, Rfl: 0  •  ACCU-CHEK SOFTCLIX LANCETS Misc, USE TO CHECK BLOOD SUGAR LEVELS 5 TIMES DAILY  **DX CODE E11.65**, Disp: 500 Each, Rfl: 1  •  ACCU-CHEK AMY PLUS strip, USE 1 STRIP TO CHECK GLUCOSE 4 TIMES DAILY **E11.65**, Disp: 150 Strip, Rfl: 0  •  aspirin 81 MG tablet, Take 81 mg by mouth every day., Disp: , Rfl:     Patient was instructed on the use of medications, either prescriptions or OTC and informed on when the appropriate follow up time period should be. In addition, patient was also instructed that should any acute worsening occur that they should notify this clinic asap or call 911.

## 2020-10-23 ENCOUNTER — TELEPHONE (OUTPATIENT)
Dept: VASCULAR LAB | Facility: MEDICAL CENTER | Age: 70
End: 2020-10-23

## 2020-10-23 NOTE — TELEPHONE ENCOUNTER
Pt no showed his last DM visit with me  Left vm message for him to reschedule  Chrissy Bethea, Clinical Pharmacist, CDE, CACP

## 2020-10-27 ENCOUNTER — TELEPHONE (OUTPATIENT)
Dept: VASCULAR LAB | Facility: MEDICAL CENTER | Age: 70
End: 2020-10-27

## 2020-10-27 NOTE — TELEPHONE ENCOUNTER
Pt no showed his last DM follow up.   Left message for patient to return call to reschedule    CC  Chrissy Bethea, Clinical Pharmacist, CDE, CACP

## 2020-10-29 ENCOUNTER — OFFICE VISIT (OUTPATIENT)
Dept: URGENT CARE | Facility: CLINIC | Age: 70
End: 2020-10-29
Payer: MEDICARE

## 2020-10-29 VITALS
HEIGHT: 67 IN | RESPIRATION RATE: 16 BRPM | TEMPERATURE: 97.4 F | BODY MASS INDEX: 27.47 KG/M2 | DIASTOLIC BLOOD PRESSURE: 76 MMHG | OXYGEN SATURATION: 97 % | HEART RATE: 87 BPM | SYSTOLIC BLOOD PRESSURE: 136 MMHG | WEIGHT: 175 LBS

## 2020-10-29 DIAGNOSIS — M79.651 ACUTE PAIN OF RIGHT THIGH: ICD-10-CM

## 2020-10-29 PROCEDURE — 99214 OFFICE O/P EST MOD 30 MIN: CPT | Performed by: PHYSICIAN ASSISTANT

## 2020-10-29 ASSESSMENT — ENCOUNTER SYMPTOMS
SHORTNESS OF BREATH: 0
WHEEZING: 0
CHILLS: 0
COUGH: 0
LEG PAIN: 1
FEVER: 0

## 2020-10-29 ASSESSMENT — FIBROSIS 4 INDEX: FIB4 SCORE: 0.95

## 2020-10-29 NOTE — PROGRESS NOTES
Subjective:   Carlos Pulido is a 70 y.o. male who presents today with   Chief Complaint   Patient presents with   • Leg Pain     right side thigh pain, hit his thigh by a tow bar happened 3 weeks ago and since then it is hurting.       Leg Pain  This is a new problem. The current episode started 1 to 4 weeks ago. The problem occurs constantly. The problem has been gradually worsening. Pertinent negatives include no chills, coughing or fever. The symptoms are aggravated by standing. He has tried nothing for the symptoms. The treatment provided no relief.   Patient notes that he hit his toe hitch on his truck with his thigh about 2 and half or 3 weeks ago  Hx of blood clot in left leg patient states after knee surgery.   PMH:  has a past medical history of Anxiety (4/16/2013), Hypertension (4/16/2013), Low testosterone (4/16/2013), Meralgia paraesthetica, right (12/18/2017), PATTI (obstructive sleep apnea) (4/16/2013), and Restless legs syndrome (RLS) (4/16/2013).  MEDS:   Current Outpatient Medications:   •  hydroCHLOROthiazide (HYDRODIURIL) 25 MG Tab, Take 1 tablet by mouth once daily, Disp: 90 Tab, Rfl: 0  •  Semaglutide,0.25 or 0.5MG/DOS, (OZEMPIC, 0.25 OR 0.5 MG/DOSE,) 2 MG/1.5ML Solution Pen-injector, Inject 0.5 mg as instructed every 7 days., Disp: 1 Each, Rfl: 3  •  metFORMIN ER (GLUCOPHAGE XR) 500 MG TABLET SR 24 HR, Take 2 Tabs by mouth 2 times a day., Disp: 120 Tab, Rfl: 3  •  meloxicam (MOBIC) 15 MG tablet, Take 1 Tab by mouth every day., Disp: 30 Tab, Rfl: 2  •  lisinopril (PRINIVIL) 40 MG tablet, Take 1 Tab by mouth every day., Disp: 90 Tab, Rfl: 1  •  Insulin Degludec (TRESIBA FLEXTOUCH) 100 UNIT/ML Solution Pen-injector, Inject 30 Units as instructed every bedtime., Disp: 5 Each, Rfl: 5  •  Empagliflozin (JARDIANCE) 25 MG Tab, Take 1 tablet by mouth every day., Disp: 30 Tab, Rfl: 3  •  atorvastatin (LIPITOR) 40 MG Tab, Take 1 tablet by mouth once daily, Disp: 90 Tab, Rfl: 0  •  amLODIPine (NORVASC)  "5 MG Tab, Take 1 tablet by mouth once daily, Disp: 90 Tab, Rfl: 0  •  ACCU-CHEK SOFTCLIX LANCETS Misc, USE TO CHECK BLOOD SUGAR LEVELS 5 TIMES DAILY  **DX CODE E11.65**, Disp: 500 Each, Rfl: 1  •  ACCU-CHEK AMY PLUS strip, USE 1 STRIP TO CHECK GLUCOSE 4 TIMES DAILY **E11.65**, Disp: 150 Strip, Rfl: 0  •  aspirin 81 MG tablet, Take 81 mg by mouth every day., Disp: , Rfl:   ALLERGIES:   Allergies   Allergen Reactions   • Amoxicillin    • Hydrocodone      SURGHX:   Past Surgical History:   Procedure Laterality Date   • KNEE REPLACEMENT, TOTAL Right 2011    done in [facility]   • KNEE REPLACEMENT, TOTAL Left 2009    done in [facility]   • RHINOPLASTY       SOCHX:  reports that he has never smoked. He has never used smokeless tobacco. He reports current alcohol use. He reports current drug use. Drug: Marijuana.  FH: Reviewed with patient, not pertinent to this visit.       Review of Systems   Constitutional: Negative for chills and fever.   Respiratory: Negative for cough, shortness of breath and wheezing.    Musculoskeletal:        Right thigh pain   All other systems reviewed and are negative.       Objective:   /76   Pulse 87   Temp 36.3 °C (97.4 °F)   Resp 16   Ht 1.702 m (5' 7\")   Wt 79.4 kg (175 lb)   SpO2 97%   BMI 27.41 kg/m²   Physical Exam  Vitals signs and nursing note reviewed.   Constitutional:       General: He is not in acute distress.     Appearance: Normal appearance. He is well-developed. He is not ill-appearing or toxic-appearing.   HENT:      Head: Normocephalic and atraumatic.      Right Ear: Hearing normal.      Left Ear: Hearing normal.   Eyes:      Pupils: Pupils are equal, round, and reactive to light.   Cardiovascular:      Rate and Rhythm: Normal rate and regular rhythm.      Heart sounds: Normal heart sounds.   Pulmonary:      Effort: Pulmonary effort is normal.   Musculoskeletal:      Right upper leg: He exhibits tenderness. He exhibits no bony tenderness, no " swelling and no edema.      Right lower leg: No edema.      Left lower leg: No edema.        Legs:       Comments: Right anterior thigh pain with palpation. No posterior pain or TTP.  No warmth or erythema. Full ROM of right lower extremity. NVI distally. Bilateral surgical scars to anterior knees. No ecchymosis to the area.   Skin:     General: Skin is warm and dry.   Neurological:      Mental Status: He is alert.      Coordination: Coordination normal.   Psychiatric:         Mood and Affect: Mood normal.     Antalgic gait.  Assessment/Plan:   Assessment    1. Acute pain of right thigh  - US-EXTREMITY VENOUS LOWER UNILAT RIGHT; Future  Patient concerned for DVT and I agree ruling out.  Patient will head to [facility] after we scheduled an ultrasound for him. Discussed risk of potential DVT turning into a PE and importance of ruling this out today and he is understanding.  Update at 1545 on 10/29/2020 patient was marked as a no-show for his ultrasound.  I called the patient and he did not answer.  I left him a voice message stating that because he missed his appointment he will need to go into the ER for rule out of blood clot at this time as we cannot reschedule this today.  Recommend that he go there today to not further delay his care and for him to call back as soon as he gets my message to discuss this, reiterated importance of doing this and potential risks with not going in to rule it out.   Differential diagnosis, natural history, supportive care, and indications for immediate follow-up discussed.   Patient given instructions and understanding of medications and treatment.    If not improving in 3-5 days, F/U with PCP or return to  if symptoms worsen.    Patient agreeable to plan.      Please note that this dictation was created using voice recognition software. I have made every reasonable attempt to correct obvious errors, but I expect that there are errors of grammar and possibly content that I did  not discover before finalizing the note.    Sae Veliz PA-C

## 2020-11-04 ENCOUNTER — ANTICOAGULATION MONITORING (OUTPATIENT)
Dept: MEDICAL GROUP | Facility: PHYSICIAN GROUP | Age: 70
End: 2020-11-04

## 2020-11-04 ENCOUNTER — NON-PROVIDER VISIT (OUTPATIENT)
Dept: MEDICAL GROUP | Facility: PHYSICIAN GROUP | Age: 70
End: 2020-11-04
Payer: MEDICARE

## 2020-11-04 ENCOUNTER — TELEPHONE (OUTPATIENT)
Dept: MEDICAL GROUP | Facility: PHYSICIAN GROUP | Age: 70
End: 2020-11-04

## 2020-11-04 PROCEDURE — 99402 PREV MED CNSL INDIV APPRX 30: CPT | Performed by: FAMILY MEDICINE

## 2020-11-04 RX ORDER — BLOOD SUGAR DIAGNOSTIC
1 STRIP MISCELLANEOUS 4 TIMES DAILY
Qty: 400 STRIP | Refills: 1 | Status: SHIPPED | OUTPATIENT
Start: 2020-11-04 | End: 2021-09-01 | Stop reason: SDUPTHER

## 2020-11-04 NOTE — NON-PROVIDER
New Patient Consult Note  Primary care physician: Jaylen Finnegan M.D.    Reason for consult: Management of Uncontrolled Type 2 Diabetes   Start time: 1:11  End time: 1:43    HPI:  Carlos Pulido is a 70 y.o. old patient who comes in today for evaluation of above stated problem.    Most Recent HbA1c:   Lab Results   Component Value Date/Time    HBA1C 8.7 (A) 09/23/2020 01:34 PM        Current Diabetes Regimen:  GLP-1 Agent: Ozempic 0.5mg sub q weekly on weekly on Wednesdays  SGLT-2 Inhibitor:  Empagliflozin 25 mg once daily   Metformin 1000mg po bid  120s-150s    Hypoglycemia:  None      Past Medical History:  Patient Active Problem List    Diagnosis Date Noted   • Type 2 diabetes mellitus with hyperglycemia, with long-term current use of insulin ([facility]) 03/13/2020   • Muscle cramps 09/10/2019   • Hypertension associated with diabetes ([facility]) 04/16/2013   • Restless legs syndrome (RLS) 04/16/2013   • PATTI (obstructive sleep apnea) 04/16/2013   • Anxiety 04/16/2013       Past Surgical History:  Past Surgical History:   Procedure Laterality Date   • KNEE REPLACEMENT, TOTAL Right 2011    done in [facility]   • KNEE REPLACEMENT, TOTAL Left 2009    done in [facility]   • RHINOPLASTY         Allergies:  Amoxicillin and Hydrocodone    Social History:  Social History     Socioeconomic History   • Marital status:      Spouse name: Not on file   • Number of children: Not on file   • Years of education: Not on file   • Highest education level: Not on file   Occupational History   • Not on file   Social Needs   • Financial resource strain: Not on file   • Food insecurity     Worry: Not on file     Inability: Not on file   • Transportation needs     Medical: Not on file     Non-medical: Not on file   Tobacco Use   • Smoking status: Never Smoker   • Smokeless tobacco: Never Used   Substance and Sexual Activity   • Alcohol use: Yes     Alcohol/week: 0.0 oz     Frequency: 2-4 times a month     Comment: x2  drinks monthly   • Drug use: Yes     Types: Marijuana     Comment: occasional    • Sexual activity: Yes     Partners: Female     Comment:    Lifestyle   • Physical activity     Days per week: Not on file     Minutes per session: Not on file   • Stress: Not on file   Relationships   • Social connections     Talks on phone: Not on file     Gets together: Not on file     Attends Anglican service: Not on file     Active member of club or organization: Not on file     Attends meetings of clubs or organizations: Not on file     Relationship status: Not on file   • Intimate partner violence     Fear of current or ex partner: Not on file     Emotionally abused: Not on file     Physically abused: Not on file     Forced sexual activity: Not on file   Other Topics Concern   •  Service No   • Blood Transfusions No   • Caffeine Concern No   • Occupational Exposure Yes     Comment: asbestos    • Hobby Hazards No   • Sleep Concern Yes   • Stress Concern No   • Weight Concern Yes   • Special Diet Yes     Comment: less carbs   • Back Care Yes   • Exercise Yes   • Bike Helmet No     Comment: does not ride bike    • Seat Belt Yes   • Self-Exams Yes   Social History Narrative    Retired from construction (pipe line work)       Family History:  Family History   Problem Relation Age of Onset   • Diabetes Mother    • Diabetes Father    • Diabetes Sister    • Diabetes Brother    • No Known Problems Brother    • Heart Disease Neg Hx    • Heart Failure Neg Hx        Medications:    Current Outpatient Medications:   •  glucose blood (ACCU-CHEK AMY PLUS) strip, 1 Strip by Other route 4 times a day., Disp: 400 Strip, Rfl: 1  •  hydroCHLOROthiazide (HYDRODIURIL) 25 MG Tab, Take 1 tablet by mouth once daily, Disp: 90 Tab, Rfl: 0  •  Semaglutide,0.25 or 0.5MG/DOS, (OZEMPIC, 0.25 OR 0.5 MG/DOSE,) 2 MG/1.5ML Solution Pen-injector, Inject 0.5 mg as instructed every 7 days., Disp: 1 Each, Rfl: 3  •  metFORMIN ER (GLUCOPHAGE XR) 500  MG TABLET SR 24 HR, Take 2 Tabs by mouth 2 times a day., Disp: 120 Tab, Rfl: 3  •  meloxicam (MOBIC) 15 MG tablet, Take 1 Tab by mouth every day., Disp: 30 Tab, Rfl: 2  •  lisinopril (PRINIVIL) 40 MG tablet, Take 1 Tab by mouth every day., Disp: 90 Tab, Rfl: 1  •  Insulin Degludec (TRESIBA FLEXTOUCH) 100 UNIT/ML Solution Pen-injector, Inject 30 Units as instructed every bedtime., Disp: 5 Each, Rfl: 5  •  Empagliflozin (JARDIANCE) 25 MG Tab, Take 1 tablet by mouth every day., Disp: 30 Tab, Rfl: 3  •  atorvastatin (LIPITOR) 40 MG Tab, Take 1 tablet by mouth once daily, Disp: 90 Tab, Rfl: 0  •  amLODIPine (NORVASC) 5 MG Tab, Take 1 tablet by mouth once daily, Disp: 90 Tab, Rfl: 0  •  ACCU-CHEK SOFTCLIX LANCETS Misc, USE TO CHECK BLOOD SUGAR LEVELS 5 TIMES DAILY  **DX CODE E11.65**, Disp: 500 Each, Rfl: 1  •  aspirin 81 MG tablet, Take 81 mg by mouth every day., Disp: , Rfl:     Labs: Reviewed    Physical Examination:  Vital signs: There were no vitals taken for this visit. There is no height or weight on file to calculate BMI.  General: No apparent distress, cooperative  Eyes: No scleral icterus or discharge  ENMT: Normal on external inspection of nose, lips, normal thyroid exam  Neck: No abnormal masses on inspection  Resp: Normal effort, clear to auscultation bilaterally   CVS: Regular rate and rhythm, S1 S2 normal, no murmur   Extremities: No edema  Abdomen: abdominal obesity present  Neuro: Alert and oriented  Skin: No rash  Psych: Normal mood and affect, intact memory and able to make informed decisions    Assessment and Plan:  Pt has been tolerating Ozempic 0.5mg sub q weekly on wednesdays, however he has been unable to afford it as he is in the donut hole, will increase to 1mg sub q weekly.  Samples given  Pt has been tolerating Jardiance 25mg po daily, samples given Pt is currently optimized on metformin 1000mg po bid   Pt has NOT been meeting his exercise goal of 30 minutes daily.   Long discussion about  lifestyle changes.  Pt has been having several coffees WITH SUGAR, REGULAR COKE, COOKIES WITH HIS COFFEE       Pt reports having less sugar in his coffee (he has dropped this by half)  Return in about 4 weeks (around 10/21/2020).     Chrissy Bethea  11/04/20    CC:   Jaylen Finnegan M.D.    This note was created using voice recognition software (Dragon). The accuracy of the dictation is limited by the abilities of the software. I have reviewed the note prior to signing, however some errors in grammar and context are still possible. If you have any questions related to this note please do not hesitate to contact our office.

## 2020-12-02 ENCOUNTER — NON-PROVIDER VISIT (OUTPATIENT)
Dept: MEDICAL GROUP | Facility: PHYSICIAN GROUP | Age: 70
End: 2020-12-02
Payer: MEDICARE

## 2020-12-02 ENCOUNTER — ANTICOAGULATION MONITORING (OUTPATIENT)
Dept: MEDICAL GROUP | Facility: PHYSICIAN GROUP | Age: 70
End: 2020-12-02

## 2020-12-02 VITALS — WEIGHT: 177 LBS | BODY MASS INDEX: 27.72 KG/M2

## 2020-12-02 DIAGNOSIS — Z79.4 TYPE 2 DIABETES MELLITUS WITH HYPERGLYCEMIA, WITH LONG-TERM CURRENT USE OF INSULIN (HCC): ICD-10-CM

## 2020-12-02 DIAGNOSIS — E11.65 TYPE 2 DIABETES MELLITUS WITH HYPERGLYCEMIA, WITH LONG-TERM CURRENT USE OF INSULIN (HCC): ICD-10-CM

## 2020-12-02 LAB
GLUCOSE BLD-MCNC: 175 MG/DL (ref 70–100)
HBA1C MFR BLD: 7 % (ref 0–5.6)
INT CON NEG: ABNORMAL
INT CON POS: ABNORMAL

## 2020-12-02 PROCEDURE — 82962 GLUCOSE BLOOD TEST: CPT | Performed by: FAMILY MEDICINE

## 2020-12-02 PROCEDURE — 99999 POCT A1C: CPT | Performed by: FAMILY MEDICINE

## 2020-12-02 PROCEDURE — 83036 HEMOGLOBIN GLYCOSYLATED A1C: CPT | Performed by: FAMILY MEDICINE

## 2020-12-02 PROCEDURE — 99999 POCT GLUCOSE: CPT | Performed by: FAMILY MEDICINE

## 2020-12-02 PROCEDURE — 99402 PREV MED CNSL INDIV APPRX 30: CPT | Performed by: FAMILY MEDICINE

## 2020-12-02 RX ORDER — SEMAGLUTIDE 1.34 MG/ML
1 INJECTION, SOLUTION SUBCUTANEOUS
Qty: 2 EACH | Refills: 1 | Status: SHIPPED | OUTPATIENT
Start: 2020-12-02 | End: 2021-04-08 | Stop reason: SDUPTHER

## 2020-12-02 RX ORDER — METFORMIN HYDROCHLORIDE 500 MG/1
1000 TABLET, EXTENDED RELEASE ORAL 2 TIMES DAILY
Qty: 360 TAB | Refills: 3 | Status: SHIPPED | OUTPATIENT
Start: 2020-12-02 | End: 2020-12-31 | Stop reason: SDUPTHER

## 2020-12-02 ASSESSMENT — FIBROSIS 4 INDEX: FIB4 SCORE: 0.95

## 2020-12-02 NOTE — NON-PROVIDER
New Patient Consult Note  Primary care physician: Jaylen Finnegan M.D.    Reason for consult: Management of Uncontrolled Type 2 Diabetes  Start time: 10:38  End time: 11:07  HPI:  Carlos Pulido is a 70 y.o. old patient who comes in today for evaluation of above stated problem.    Most Recent HbA1c:   Lab Results   Component Value Date/Time    HBA1C 7.0 (A) 12/02/2020 10:53 AM        Current Diabetes Regimen:  GLP-1 Agent: Ozempic 0.5mg sub q weekly on Sundays  Jardiance 25mg po daily  Metformin 1000mg po bid  Basal Insulin: Tresiba(Insulin degludec) 30-35 units sc once daily     Other times: 150s-160s   Hypoglycemia:  None        Past Medical History:  Patient Active Problem List    Diagnosis Date Noted   • Type 2 diabetes mellitus with hyperglycemia, with long-term current use of insulin ([facility]) 03/13/2020   • Muscle cramps 09/10/2019   • Hypertension associated with diabetes ([facility]) 04/16/2013   • Restless legs syndrome (RLS) 04/16/2013   • PATTI (obstructive sleep apnea) 04/16/2013   • Anxiety 04/16/2013       Past Surgical History:  Past Surgical History:   Procedure Laterality Date   • KNEE REPLACEMENT, TOTAL Right 2011    done in [facility]   • KNEE REPLACEMENT, TOTAL Left 2009    done in [facility]   • RHINOPLASTY         Allergies:  Amoxicillin and Hydrocodone    Social History:  Social History     Socioeconomic History   • Marital status:      Spouse name: Not on file   • Number of children: Not on file   • Years of education: Not on file   • Highest education level: Not on file   Occupational History   • Not on file   Social Needs   • Financial resource strain: Not on file   • Food insecurity     Worry: Not on file     Inability: Not on file   • Transportation needs     Medical: Not on file     Non-medical: Not on file   Tobacco Use   • Smoking status: Never Smoker   • Smokeless tobacco: Never Used   Substance and Sexual Activity   • Alcohol use: Yes     Alcohol/week: 0.0 oz      Frequency: 2-4 times a month     Comment: x2 drinks monthly   • Drug use: Yes     Types: Marijuana     Comment: occasional    • Sexual activity: Yes     Partners: Female     Comment:    Lifestyle   • Physical activity     Days per week: Not on file     Minutes per session: Not on file   • Stress: Not on file   Relationships   • Social connections     Talks on phone: Not on file     Gets together: Not on file     Attends Faith service: Not on file     Active member of club or organization: Not on file     Attends meetings of clubs or organizations: Not on file     Relationship status: Not on file   • Intimate partner violence     Fear of current or ex partner: Not on file     Emotionally abused: Not on file     Physically abused: Not on file     Forced sexual activity: Not on file   Other Topics Concern   •  Service No   • Blood Transfusions No   • Caffeine Concern No   • Occupational Exposure Yes     Comment: asbestos    • Hobby Hazards No   • Sleep Concern Yes   • Stress Concern No   • Weight Concern Yes   • Special Diet Yes     Comment: less carbs   • Back Care Yes   • Exercise Yes   • Bike Helmet No     Comment: does not ride bike    • Seat Belt Yes   • Self-Exams Yes   Social History Narrative    Retired from construction (pipe line work)       Family History:  Family History   Problem Relation Age of Onset   • Diabetes Mother    • Diabetes Father    • Diabetes Sister    • Diabetes Brother    • No Known Problems Brother    • Heart Disease Neg Hx    • Heart Failure Neg Hx        Medications:    Current Outpatient Medications:   •  glucose blood (ACCU-CHEK AMY PLUS) strip, 1 Strip by Other route 4 times a day., Disp: 400 Strip, Rfl: 1  •  hydroCHLOROthiazide (HYDRODIURIL) 25 MG Tab, Take 1 tablet by mouth once daily, Disp: 90 Tab, Rfl: 0  •  Semaglutide,0.25 or 0.5MG/DOS, (OZEMPIC, 0.25 OR 0.5 MG/DOSE,) 2 MG/1.5ML Solution Pen-injector, Inject 0.5 mg as instructed every 7 days., Disp: 1 Each,  Rfl: 3  •  metFORMIN ER (GLUCOPHAGE XR) 500 MG TABLET SR 24 HR, Take 2 Tabs by mouth 2 times a day., Disp: 120 Tab, Rfl: 3  •  meloxicam (MOBIC) 15 MG tablet, Take 1 Tab by mouth every day., Disp: 30 Tab, Rfl: 2  •  lisinopril (PRINIVIL) 40 MG tablet, Take 1 Tab by mouth every day., Disp: 90 Tab, Rfl: 1  •  Insulin Degludec (TRESIBA FLEXTOUCH) 100 UNIT/ML Solution Pen-injector, Inject 30 Units as instructed every bedtime., Disp: 5 Each, Rfl: 5  •  Empagliflozin (JARDIANCE) 25 MG Tab, Take 1 tablet by mouth every day., Disp: 30 Tab, Rfl: 3  •  atorvastatin (LIPITOR) 40 MG Tab, Take 1 tablet by mouth once daily, Disp: 90 Tab, Rfl: 0  •  amLODIPine (NORVASC) 5 MG Tab, Take 1 tablet by mouth once daily, Disp: 90 Tab, Rfl: 0  •  ACCU-CHEK SOFTCLIX LANCETS Misc, USE TO CHECK BLOOD SUGAR LEVELS 5 TIMES DAILY  **DX CODE E11.65**, Disp: 500 Each, Rfl: 1  •  aspirin 81 MG tablet, Take 81 mg by mouth every day., Disp: , Rfl:         Physical Examination:  Vital signs: Wt 80.3 kg (177 lb)   BMI 27.72 kg/m²  Body mass index is 27.72 kg/m².      Assessment and Plan:  Pt is currently tolerating Ozempic 0.5mg sub q weekly on Sundays, will further optimize to 1mg sub q weekly.    Pt is currently optimized on metformin 1000mg po bid  Pt is currently optimized on Jardiance 25mg po daily  Pt has been using Tresiba 30 units at bedtime, will further reduce to 15 units at bedtime.    Pt is currently not doing any dedicated walking, however, he has been working on his one acre property.    Pt is making better dietary choices.  A1c has improved from 8.7 to 7.0  Continue present management.      Thank you for allowing me to participate in the care of this patient.    Chrissy Bethea  12/02/20    CC:   Jaylen Finnegan M.D.    This note was created using voice recognition software (Dragon). The accuracy of the dictation is limited by the abilities of the software. I have reviewed the note prior to signing, however some errors in grammar and  context are still possible. If you have any questions related to this note please do not hesitate to contact our office.

## 2020-12-31 DIAGNOSIS — E78.2 MIXED HYPERLIPIDEMIA: ICD-10-CM

## 2020-12-31 DIAGNOSIS — I10 ESSENTIAL HYPERTENSION: Chronic | ICD-10-CM

## 2021-01-04 RX ORDER — AMLODIPINE BESYLATE 5 MG/1
TABLET ORAL
Qty: 90 TAB | Refills: 0 | Status: SHIPPED | OUTPATIENT
Start: 2021-01-04 | End: 2021-06-08 | Stop reason: SDUPTHER

## 2021-01-04 RX ORDER — METFORMIN HYDROCHLORIDE 500 MG/1
1000 TABLET, EXTENDED RELEASE ORAL 2 TIMES DAILY
Qty: 360 TAB | Refills: 3 | Status: SHIPPED | OUTPATIENT
Start: 2021-01-04 | End: 2021-04-08 | Stop reason: SDUPTHER

## 2021-01-04 RX ORDER — LISINOPRIL 40 MG/1
40 TABLET ORAL DAILY
Qty: 90 TAB | Refills: 1 | Status: SHIPPED | OUTPATIENT
Start: 2021-01-04 | End: 2021-05-11

## 2021-01-04 RX ORDER — ATORVASTATIN CALCIUM 40 MG/1
TABLET, FILM COATED ORAL
Qty: 90 TAB | Refills: 0 | Status: SHIPPED | OUTPATIENT
Start: 2021-01-04 | End: 2021-06-08 | Stop reason: SDUPTHER

## 2021-01-04 RX ORDER — HYDROCHLOROTHIAZIDE 25 MG/1
TABLET ORAL
Qty: 90 TAB | Refills: 0 | Status: SHIPPED | OUTPATIENT
Start: 2021-01-04 | End: 2021-06-08

## 2021-01-04 RX ORDER — INSULIN DEGLUDEC 100 U/ML
30 INJECTION, SOLUTION SUBCUTANEOUS
Qty: 5 EACH | Refills: 5 | Status: SHIPPED | OUTPATIENT
Start: 2021-01-04 | End: 2021-04-08 | Stop reason: SDUPTHER

## 2021-01-13 ENCOUNTER — NON-PROVIDER VISIT (OUTPATIENT)
Dept: MEDICAL GROUP | Facility: PHYSICIAN GROUP | Age: 71
End: 2021-01-13
Payer: MEDICARE

## 2021-01-13 ENCOUNTER — ANTICOAGULATION MONITORING (OUTPATIENT)
Dept: MEDICAL GROUP | Facility: PHYSICIAN GROUP | Age: 71
End: 2021-01-13

## 2021-01-13 DIAGNOSIS — E11.65 TYPE 2 DIABETES MELLITUS WITH HYPERGLYCEMIA, WITHOUT LONG-TERM CURRENT USE OF INSULIN (HCC): ICD-10-CM

## 2021-01-13 LAB — GLUCOSE BLD-MCNC: 132 MG/DL (ref 70–100)

## 2021-01-13 PROCEDURE — 99211 OFF/OP EST MAY X REQ PHY/QHP: CPT | Performed by: FAMILY MEDICINE

## 2021-01-13 PROCEDURE — 82962 GLUCOSE BLOOD TEST: CPT | Performed by: FAMILY MEDICINE

## 2021-01-13 NOTE — NON-PROVIDER
New Patient Consult Note  Primary care physician: Jaylen Finnegan M.D.    Reason for consult: Management of Uncontrolled Type 2 Diabetes  Start time: 10:45  End time: 11:15    HPI:  Carlos Pulido is a 70 y.o. old patient who comes in today for evaluation of above stated problem.    Most Recent HbA1c:   Lab Results   Component Value Date/Time    HBA1C 7.0 (A) 12/02/2020 10:53 AM      FSBG 132    Current Diabetes Regimen:  GLP-1 Agent: Ozempic 1mg sub q weekly  SGLT-2 Inhibitor:  Empagliflozin 25 mg once daily   Metformin 1000mg po bid   Basal Insulin: Tresiba(Insulin degludec) 35 units sc once daily   Before Bedtime:  Other times: 190s  Hypoglycemia:  None            Past Medical History:  Patient Active Problem List    Diagnosis Date Noted   • Type 2 diabetes mellitus with hyperglycemia, with long-term current use of insulin ([facility]) 03/13/2020   • Muscle cramps 09/10/2019   • Hypertension associated with diabetes ([facility]) 04/16/2013   • Restless legs syndrome (RLS) 04/16/2013   • PATTI (obstructive sleep apnea) 04/16/2013   • Anxiety 04/16/2013       Past Surgical History:  Past Surgical History:   Procedure Laterality Date   • KNEE REPLACEMENT, TOTAL Right 2011    done in [facility]   • KNEE REPLACEMENT, TOTAL Left 2009    done in [facility]   • RHINOPLASTY         Allergies:  Amoxicillin and Hydrocodone    Social History:  Social History     Socioeconomic History   • Marital status:      Spouse name: Not on file   • Number of children: Not on file   • Years of education: Not on file   • Highest education level: Not on file   Occupational History   • Not on file   Social Needs   • Financial resource strain: Not on file   • Food insecurity     Worry: Not on file     Inability: Not on file   • Transportation needs     Medical: Not on file     Non-medical: Not on file   Tobacco Use   • Smoking status: Never Smoker   • Smokeless tobacco: Never Used   Substance and Sexual Activity   • Alcohol use:  Yes     Alcohol/week: 0.0 oz     Frequency: 2-4 times a month     Comment: x2 drinks monthly   • Drug use: Yes     Types: Marijuana     Comment: occasional    • Sexual activity: Yes     Partners: Female     Comment:    Lifestyle   • Physical activity     Days per week: Not on file     Minutes per session: Not on file   • Stress: Not on file   Relationships   • Social connections     Talks on phone: Not on file     Gets together: Not on file     Attends Hoahaoism service: Not on file     Active member of club or organization: Not on file     Attends meetings of clubs or organizations: Not on file     Relationship status: Not on file   • Intimate partner violence     Fear of current or ex partner: Not on file     Emotionally abused: Not on file     Physically abused: Not on file     Forced sexual activity: Not on file   Other Topics Concern   •  Service No   • Blood Transfusions No   • Caffeine Concern No   • Occupational Exposure Yes     Comment: asbestos    • Hobby Hazards No   • Sleep Concern Yes   • Stress Concern No   • Weight Concern Yes   • Special Diet Yes     Comment: less carbs   • Back Care Yes   • Exercise Yes   • Bike Helmet No     Comment: does not ride bike    • Seat Belt Yes   • Self-Exams Yes   Social History Narrative    Retired from construction (pipe line work)       Family History:  Family History   Problem Relation Age of Onset   • Diabetes Mother    • Diabetes Father    • Diabetes Sister    • Diabetes Brother    • No Known Problems Brother    • Heart Disease Neg Hx    • Heart Failure Neg Hx        Medications:    Current Outpatient Medications:   •  metFORMIN ER (GLUCOPHAGE XR) 500 MG TABLET SR 24 HR, Take 2 Tabs by mouth 2 times a day., Disp: 360 Tab, Rfl: 3  •  hydroCHLOROthiazide (HYDRODIURIL) 25 MG Tab, Take 1 tablet by mouth once daily, Disp: 90 Tab, Rfl: 0  •  lisinopril (PRINIVIL) 40 MG tablet, Take 1 Tab by mouth every day., Disp: 90 Tab, Rfl: 1  •  Insulin Degludec  (TRESIBA FLEXTOUCH) 100 UNIT/ML Solution Pen-injector, Inject 30 Units under the skin every bedtime., Disp: 5 Each, Rfl: 5  •  atorvastatin (LIPITOR) 40 MG Tab, Take 1 tablet by mouth once daily, Disp: 90 Tab, Rfl: 0  •  amLODIPine (NORVASC) 5 MG Tab, Take 1 tablet by mouth once daily, Disp: 90 Tab, Rfl: 0  •  Semaglutide, 1 MG/DOSE, (OZEMPIC, 1 MG/DOSE,) 2 MG/1.5ML Solution Pen-injector, Inject 1 mg under the skin every 7 days., Disp: 2 Each, Rfl: 1  •  glucose blood (ACCU-CHEK AMY PLUS) strip, 1 Strip by Other route 4 times a day., Disp: 400 Strip, Rfl: 1  •  meloxicam (MOBIC) 15 MG tablet, Take 1 Tab by mouth every day., Disp: 30 Tab, Rfl: 2  •  Empagliflozin (JARDIANCE) 25 MG Tab, Take 1 tablet by mouth every day., Disp: 30 Tab, Rfl: 3  •  ACCU-CHEK SOFTCLIX LANCETS Misc, USE TO CHECK BLOOD SUGAR LEVELS 5 TIMES DAILY  **DX CODE E11.65**, Disp: 500 Each, Rfl: 1  •  aspirin 81 MG tablet, Take 81 mg by mouth every day., Disp: , Rfl:       Assessment and Plan:  Pt admits to outeating his drug regimen.  Pt has not been good about his lifestyle changes.  Pt is now ready to apply lifestyle changes and complete adherence with medications.    Pt is currently on Ozempic 1mg sub q weekly on sundays  Pt is currently optimized on metformin 1000mg po bid  Pt will resume his jardiance, samples given  Pt to continue Tresiba 35 units daily until FSBG to goal.    Pt is currently homeless, staying with his ex wife, and trying to get moved.  Will follow closely to ensure adherence/compliance    There are no diagnoses linked to this encounter.    Return in about 4 weeks (around 2/10/2021).    Thank you for allowing me to participate in the care of this patient.    Chrissy Bethea  01/13/21    CC:   Jaylen Finnegan M.D.    This note was created using voice recognition software (Dragon). The accuracy of the dictation is limited by the abilities of the software. I have reviewed the note prior to signing, however some errors in  grammar and context are still possible. If you have any questions related to this note please do not hesitate to contact our office.

## 2021-01-13 NOTE — PROGRESS NOTES
Pt is unable to make his deductible payment of $445 prior to his coinsurance kicking in.  Jardiance samples given  Chrissy Bethea, Clinical Pharmacist, CDE, CACP

## 2021-04-08 DIAGNOSIS — E11.65 TYPE 2 DIABETES MELLITUS WITH HYPERGLYCEMIA, WITH LONG-TERM CURRENT USE OF INSULIN (HCC): ICD-10-CM

## 2021-04-08 DIAGNOSIS — Z79.4 TYPE 2 DIABETES MELLITUS WITH HYPERGLYCEMIA, WITH LONG-TERM CURRENT USE OF INSULIN (HCC): ICD-10-CM

## 2021-04-08 RX ORDER — EMPAGLIFLOZIN 25 MG/1
1 TABLET, FILM COATED ORAL DAILY
Qty: 30 TABLET | Refills: 3 | Status: SHIPPED | OUTPATIENT
Start: 2021-04-08 | End: 2021-10-04

## 2021-04-08 RX ORDER — INSULIN DEGLUDEC 100 U/ML
30 INJECTION, SOLUTION SUBCUTANEOUS
Qty: 5 EACH | Refills: 5 | Status: SHIPPED | OUTPATIENT
Start: 2021-04-08 | End: 2021-06-15 | Stop reason: SDUPTHER

## 2021-04-08 RX ORDER — SEMAGLUTIDE 1.34 MG/ML
1 INJECTION, SOLUTION SUBCUTANEOUS
Qty: 2 EACH | Refills: 1 | Status: SHIPPED | OUTPATIENT
Start: 2021-04-08

## 2021-04-08 RX ORDER — METFORMIN HYDROCHLORIDE 500 MG/1
1000 TABLET, EXTENDED RELEASE ORAL 2 TIMES DAILY
Qty: 360 TABLET | Refills: 3 | Status: SHIPPED | OUTPATIENT
Start: 2021-04-08 | End: 2021-11-24 | Stop reason: SDUPTHER

## 2021-04-21 ENCOUNTER — NON-PROVIDER VISIT (OUTPATIENT)
Dept: MEDICAL GROUP | Facility: PHYSICIAN GROUP | Age: 71
End: 2021-04-21
Payer: MEDICARE

## 2021-04-21 ENCOUNTER — ANTICOAGULATION MONITORING (OUTPATIENT)
Dept: MEDICAL GROUP | Facility: PHYSICIAN GROUP | Age: 71
End: 2021-04-21

## 2021-04-21 DIAGNOSIS — E11.9 TYPE 2 DIABETES MELLITUS WITHOUT COMPLICATION, WITHOUT LONG-TERM CURRENT USE OF INSULIN (HCC): ICD-10-CM

## 2021-04-21 LAB
GLUCOSE BLD-MCNC: 147 MG/DL (ref 70–100)
HBA1C MFR BLD: 8 % (ref 0–5.6)
INT CON NEG: ABNORMAL
INT CON POS: ABNORMAL

## 2021-04-21 PROCEDURE — 99211 OFF/OP EST MAY X REQ PHY/QHP: CPT | Performed by: FAMILY MEDICINE

## 2021-04-21 PROCEDURE — 82962 GLUCOSE BLOOD TEST: CPT | Performed by: FAMILY MEDICINE

## 2021-04-21 PROCEDURE — 83036 HEMOGLOBIN GLYCOSYLATED A1C: CPT | Performed by: FAMILY MEDICINE

## 2021-04-21 NOTE — NON-PROVIDER
Patient Consult Note    TIME IN: 10:50  TIME OUT: 11:24    Primary care physician: Jaylen Finnegan M.D.    Reason for consult: Management of Uncontrolled Type 2 Diabetes    HPI:  Carlos Pulido is a 70 y.o. old patient who comes in today for evaluation of above stated problem.    Most Recent HbA1c:   Lab Results   Component Value Date/Time    HBA1C 8.0 (A) 04/21/2021 11:25 AM      Lab Results   Component Value Date/Time    CREATININE 1.11 12/26/2019 08:39 AM        Recent Labs     04/21/21  1125   POCGLUCOSE 147*       Diabetes Medication History and Current Regimen  Metformin: 1000mg po bid    GLP-1 Agent: Ozempic 1mg sub q weekly - pt has not been using secondary to cost  SGLT-2 Inhibitor:  Empagliflozin 25 mg once daily - pt has not been using secondary to cost      Other times: pt does not regularly test FSBG      Last A1c -   Lab Results   Component Value Date/Time    HBA1C 8.0 (A) 04/21/2021 11:25 AM      Last Microalbuminuria -       updated caregaps    Past Medical History:  Patient Active Problem List    Diagnosis Date Noted   • Type 2 diabetes mellitus with hyperglycemia, with long-term current use of insulin ([facility]) 03/13/2020   • Muscle cramps 09/10/2019   • Hypertension associated with diabetes ([facility]) 04/16/2013   • Restless legs syndrome (RLS) 04/16/2013   • PATTI (obstructive sleep apnea) 04/16/2013   • Anxiety 04/16/2013       Past Surgical History:  Past Surgical History:   Procedure Laterality Date   • KNEE REPLACEMENT, TOTAL Right 2011    done in [facility]   • KNEE REPLACEMENT, TOTAL Left 2009    done in [facility]   • RHINOPLASTY         Allergies:  Amoxicillin and Hydrocodone    Social History:  Social History     Socioeconomic History   • Marital status:      Spouse name: Not on file   • Number of children: Not on file   • Years of education: Not on file   • Highest education level: Not on file   Occupational History   • Not on file   Tobacco Use   • Smoking status:  Never Smoker   • Smokeless tobacco: Never Used   Substance and Sexual Activity   • Alcohol use: Yes     Alcohol/week: 0.0 oz     Comment: x2 drinks monthly   • Drug use: Yes     Types: Marijuana     Comment: occasional    • Sexual activity: Yes     Partners: Female     Comment:    Other Topics Concern   •  Service No   • Blood Transfusions No   • Caffeine Concern No   • Occupational Exposure Yes     Comment: asbestos    • Hobby Hazards No   • Sleep Concern Yes   • Stress Concern No   • Weight Concern Yes   • Special Diet Yes     Comment: less carbs   • Back Care Yes   • Exercise Yes   • Bike Helmet No     Comment: does not ride bike    • Seat Belt Yes   • Self-Exams Yes   Social History Narrative    Retired from construction (pipe line work)     Social Determinants of Health     Financial Resource Strain:    • Difficulty of Paying Living Expenses:    Food Insecurity:    • Worried About Running Out of Food in the Last Year:    • Ran Out of Food in the Last Year:    Transportation Needs:    • Lack of Transportation (Medical):    • Lack of Transportation (Non-Medical):    Physical Activity:    • Days of Exercise per Week:    • Minutes of Exercise per Session:    Stress:    • Feeling of Stress :    Social Connections:    • Frequency of Communication with Friends and Family:    • Frequency of Social Gatherings with Friends and Family:    • Attends Sikh Services:    • Active Member of Clubs or Organizations:    • Attends Club or Organization Meetings:    • Marital Status:    Intimate Partner Violence:    • Fear of Current or Ex-Partner:    • Emotionally Abused:    • Physically Abused:    • Sexually Abused:        Family History:  Family History   Problem Relation Age of Onset   • Diabetes Mother    • Diabetes Father    • Diabetes Sister    • Diabetes Brother    • No Known Problems Brother    • Heart Disease Neg Hx    • Heart Failure Neg Hx        Medications:    Current Outpatient Medications:   •   Empagliflozin (JARDIANCE) 25 MG Tab, Take 1 tablet by mouth every day., Disp: 30 tablet, Rfl: 3  •  Insulin Degludec (TRESIBA FLEXTOUCH) 100 UNIT/ML Solution Pen-injector, Inject 30 Units under the skin at bedtime., Disp: 5 Each, Rfl: 5  •  metFORMIN ER (GLUCOPHAGE XR) 500 MG TABLET SR 24 HR, Take 2 Tablets by mouth 2 times a day., Disp: 360 tablet, Rfl: 3  •  Semaglutide, 1 MG/DOSE, (OZEMPIC, 1 MG/DOSE,) 2 MG/1.5ML Solution Pen-injector, Inject 1 mg under the skin every 7 days., Disp: 2 Each, Rfl: 1  •  hydroCHLOROthiazide (HYDRODIURIL) 25 MG Tab, Take 1 tablet by mouth once daily, Disp: 90 Tab, Rfl: 0  •  lisinopril (PRINIVIL) 40 MG tablet, Take 1 Tab by mouth every day., Disp: 90 Tab, Rfl: 1  •  atorvastatin (LIPITOR) 40 MG Tab, Take 1 tablet by mouth once daily, Disp: 90 Tab, Rfl: 0  •  amLODIPine (NORVASC) 5 MG Tab, Take 1 tablet by mouth once daily, Disp: 90 Tab, Rfl: 0  •  glucose blood (ACCU-CHEK AMY PLUS) strip, 1 Strip by Other route 4 times a day., Disp: 400 Strip, Rfl: 1  •  meloxicam (MOBIC) 15 MG tablet, Take 1 Tab by mouth every day., Disp: 30 Tab, Rfl: 2  •  ACCU-CHEK SOFTCLIX LANCETS Misc, USE TO CHECK BLOOD SUGAR LEVELS 5 TIMES DAILY  **DX CODE E11.65**, Disp: 500 Each, Rfl: 1  •  aspirin 81 MG tablet, Take 81 mg by mouth every day., Disp: , Rfl:     Labs: Reviewed    Physical Examination:  Vital signs: There were no vitals taken for this visit. There is no height or weight on file to calculate BMI.    Assessment and Plan:    1. DM2  Pt no showed his last visit for DM fu.  Pt has been stressed about selling his home, which he has.  Pt has NOT been taking his ozempic nor jardiance secondary to cost.  PAP forms completed and given to patient to complete financial section.    Pt is THRILLED with his pharmacist visits, as he is very comfortable with our service.  Pt cannot thank me enough for helping him through his diabetic journey.      Pt has been trying to walk more, make better diet choices and  continue complete abstinence of sugar sweetened beverages.    Continue present management.  Samples of ozempic and jardiance given.    A1c has risen from 7.0 to 8.0 as pt has not been able to afford his medications.    Pt looks forward to better FSBG and a lower A1c  It has been my pleasure taking care of Mr. Pulido    Pt to return to clinic in two weeks with completed PAP forms.      Return in about 2 weeks (around 5/5/2021).    Chrissy Bethea  04/21/21    CC:   Jaylen Finnegan M.D.

## 2021-05-05 ENCOUNTER — NON-PROVIDER VISIT (OUTPATIENT)
Dept: MEDICAL GROUP | Facility: PHYSICIAN GROUP | Age: 71
End: 2021-05-05
Payer: MEDICARE

## 2021-05-05 ENCOUNTER — ANTICOAGULATION MONITORING (OUTPATIENT)
Dept: CARDIOLOGY | Facility: PHYSICIAN GROUP | Age: 71
End: 2021-05-05

## 2021-05-05 PROCEDURE — 99999 PR NO CHARGE: CPT

## 2021-05-05 PROCEDURE — 99211 OFF/OP EST MAY X REQ PHY/QHP: CPT | Performed by: FAMILY MEDICINE

## 2021-05-05 NOTE — NON-PROVIDER
Patient Consult Note    TIME IN: 9:45  TIME OUT: 10:30    Primary care physician: Jalyen Finnegan M.D.    Reason for consult: Management of Uncontrolled Type 2 Diabetes    HPI:  Carlos Pulido is a 70 y.o. old patient who comes in today for evaluation of above stated problem.    Most Recent HbA1c:   Lab Results   Component Value Date/Time    HBA1C 8.0 (A) 04/21/2021 11:25 AM      Lab Results   Component Value Date/Time    CREATININE 1.11 12/26/2019 08:39 AM              Diabetes Medication History and Current Regimen  Metformin: 1000mg po bid    GLP-1 Agent: Ozempic 1mg sub q weekly  SGLT-2 Inhibitor:  Empagliflozin 25 mg once daily       Basal Insulin: tresiba 35 units daily      Pt has home glucometer and proper testing technique - yes      Other times: 130s    Hypoglycemia awareness - yes  Nocturnal hypoglycemia- none  Hypoglycemia:  None    Pt's treatment of Hypoglycemia - n/a  - 15:15 Rule    Current Exercise - pt reports walking around the neighborhood about three times per week.  Exercise Goal - 30 mintues dialy    Dietary - pt continues to try to reduce simple carbohydrate from diet      Foot Exam:  Monofilament exam - due will try to complete at next visit  Monofilament testing with a 10 gram force: sensation intact: decreased bilaterally.    Visual Inspection: Feet without maceration, ulcers, fissures.  Feet dry.  Pedal pulses: intact bilaterally    Preventative Management  BP regimen (ACE/ARB) - lisinopril 40mg po daily  ASA - 81mg po daily  Statin - atorvastatin 40mg po daily  Last Retinal Scan - due  Last Foot Exam - will try to complete at next visit  Last A1c -   Lab Results   Component Value Date/Time    HBA1C 8.0 (A) 04/21/2021 11:25 AM      Last Microalbuminuria - will try to complete at next visit      updated caregaps    Past Medical History:  Patient Active Problem List    Diagnosis Date Noted   • Type 2 diabetes mellitus with hyperglycemia, with long-term current use of insulin ([facility])  03/13/2020   • Muscle cramps 09/10/2019   • Hypertension associated with diabetes ([facility]) 04/16/2013   • Restless legs syndrome (RLS) 04/16/2013   • PATTI (obstructive sleep apnea) 04/16/2013   • Anxiety 04/16/2013       Past Surgical History:  Past Surgical History:   Procedure Laterality Date   • KNEE REPLACEMENT, TOTAL Right 2011    done in [facility]   • KNEE REPLACEMENT, TOTAL Left 2009    done in [facility]   • RHINOPLASTY         Allergies:  Amoxicillin and Hydrocodone    Social History:  Social History     Socioeconomic History   • Marital status:      Spouse name: Not on file   • Number of children: Not on file   • Years of education: Not on file   • Highest education level: Not on file   Occupational History   • Not on file   Tobacco Use   • Smoking status: Never Smoker   • Smokeless tobacco: Never Used   Substance and Sexual Activity   • Alcohol use: Yes     Alcohol/week: 0.0 oz     Comment: x2 drinks monthly   • Drug use: Yes     Types: Marijuana     Comment: occasional    • Sexual activity: Yes     Partners: Female     Comment:    Other Topics Concern   •  Service No   • Blood Transfusions No   • Caffeine Concern No   • Occupational Exposure Yes     Comment: asbestos    • Hobby Hazards No   • Sleep Concern Yes   • Stress Concern No   • Weight Concern Yes   • Special Diet Yes     Comment: less carbs   • Back Care Yes   • Exercise Yes   • Bike Helmet No     Comment: does not ride bike    • Seat Belt Yes   • Self-Exams Yes   Social History Narrative    Retired from construction (pipe line work)     Social Determinants of Health     Financial Resource Strain:    • Difficulty of Paying Living Expenses:    Food Insecurity:    • Worried About Running Out of Food in the Last Year:    • Ran Out of Food in the Last Year:    Transportation Needs:    • Lack of Transportation (Medical):    • Lack of Transportation (Non-Medical):    Physical Activity:    • Days of Exercise per  Week:    • Minutes of Exercise per Session:    Stress:    • Feeling of Stress :    Social Connections:    • Frequency of Communication with Friends and Family:    • Frequency of Social Gatherings with Friends and Family:    • Attends Spiritism Services:    • Active Member of Clubs or Organizations:    • Attends Club or Organization Meetings:    • Marital Status:    Intimate Partner Violence:    • Fear of Current or Ex-Partner:    • Emotionally Abused:    • Physically Abused:    • Sexually Abused:        Family History:  Family History   Problem Relation Age of Onset   • Diabetes Mother    • Diabetes Father    • Diabetes Sister    • Diabetes Brother    • No Known Problems Brother    • Heart Disease Neg Hx    • Heart Failure Neg Hx        Medications:    Current Outpatient Medications:   •  Empagliflozin (JARDIANCE) 25 MG Tab, Take 1 tablet by mouth every day., Disp: 30 tablet, Rfl: 3  •  Insulin Degludec (TRESIBA FLEXTOUCH) 100 UNIT/ML Solution Pen-injector, Inject 30 Units under the skin at bedtime., Disp: 5 Each, Rfl: 5  •  metFORMIN ER (GLUCOPHAGE XR) 500 MG TABLET SR 24 HR, Take 2 Tablets by mouth 2 times a day., Disp: 360 tablet, Rfl: 3  •  Semaglutide, 1 MG/DOSE, (OZEMPIC, 1 MG/DOSE,) 2 MG/1.5ML Solution Pen-injector, Inject 1 mg under the skin every 7 days., Disp: 2 Each, Rfl: 1  •  hydroCHLOROthiazide (HYDRODIURIL) 25 MG Tab, Take 1 tablet by mouth once daily, Disp: 90 Tab, Rfl: 0  •  lisinopril (PRINIVIL) 40 MG tablet, Take 1 Tab by mouth every day., Disp: 90 Tab, Rfl: 1  •  atorvastatin (LIPITOR) 40 MG Tab, Take 1 tablet by mouth once daily, Disp: 90 Tab, Rfl: 0  •  amLODIPine (NORVASC) 5 MG Tab, Take 1 tablet by mouth once daily, Disp: 90 Tab, Rfl: 0  •  glucose blood (ACCU-CHEK AMY PLUS) strip, 1 Strip by Other route 4 times a day., Disp: 400 Strip, Rfl: 1  •  meloxicam (MOBIC) 15 MG tablet, Take 1 Tab by mouth every day., Disp: 30 Tab, Rfl: 2  •  ACCU-CHEK SOFTCLIX LANCETS Misc, USE TO CHECK BLOOD  SUGAR LEVELS 5 TIMES DAILY  **DX CODE E11.65**, Disp: 500 Each, Rfl: 1  •  aspirin 81 MG tablet, Take 81 mg by mouth every day., Disp: , Rfl:     Labs: Reviewed    Physical Examination:  Vital signs: There were no vitals taken for this visit. There is no height or weight on file to calculate BMI.    Assessment and Plan:    1. DM2  Pt assistance forms completed and faxed to both [employer], and [employer], and scanned into patient media.  The bulk of this visit was devoted to getting him his medications from the .    Pt to continue present management.    - Medication changes - none     - Lifestyle changes - continue to reduce simple carbohydrate in his diet.  Pt would benefit from daily dedicated walking.  Pt will be moving to [facility] soon, and he believes he will walk more once he is there    Return in about 6 weeks (around 6/16/2021).    Chrissy Bethea  05/05/21    CC:   Jaylen Finnegan M.D.

## 2021-05-11 DIAGNOSIS — I10 ESSENTIAL HYPERTENSION: Chronic | ICD-10-CM

## 2021-05-11 RX ORDER — LISINOPRIL 40 MG/1
TABLET ORAL
Qty: 90 TABLET | Refills: 0 | Status: SHIPPED | OUTPATIENT
Start: 2021-05-11

## 2021-06-08 DIAGNOSIS — E11.65 TYPE 2 DIABETES MELLITUS WITH HYPERGLYCEMIA, WITH LONG-TERM CURRENT USE OF INSULIN (HCC): ICD-10-CM

## 2021-06-08 DIAGNOSIS — I10 ESSENTIAL HYPERTENSION: Chronic | ICD-10-CM

## 2021-06-08 DIAGNOSIS — E78.2 MIXED HYPERLIPIDEMIA: ICD-10-CM

## 2021-06-08 DIAGNOSIS — Z79.4 TYPE 2 DIABETES MELLITUS WITH HYPERGLYCEMIA, WITH LONG-TERM CURRENT USE OF INSULIN (HCC): ICD-10-CM

## 2021-06-08 RX ORDER — LANCETS
EACH MISCELLANEOUS
Qty: 500 EACH | Refills: 1 | Status: SHIPPED | OUTPATIENT
Start: 2021-06-08

## 2021-06-08 RX ORDER — AMLODIPINE BESYLATE 5 MG/1
TABLET ORAL
Qty: 90 TABLET | Refills: 0 | Status: SHIPPED | OUTPATIENT
Start: 2021-06-08 | End: 2021-09-01 | Stop reason: SDUPTHER

## 2021-06-08 RX ORDER — HYDROCHLOROTHIAZIDE 25 MG/1
TABLET ORAL
Qty: 90 TABLET | Refills: 0 | Status: SHIPPED | OUTPATIENT
Start: 2021-06-08 | End: 2021-06-15 | Stop reason: SDUPTHER

## 2021-06-08 RX ORDER — ATORVASTATIN CALCIUM 40 MG/1
TABLET, FILM COATED ORAL
Qty: 90 TABLET | Refills: 0 | Status: SHIPPED | OUTPATIENT
Start: 2021-06-08 | End: 2021-09-01 | Stop reason: SDUPTHER

## 2021-06-15 ENCOUNTER — OFFICE VISIT (OUTPATIENT)
Dept: MEDICAL GROUP | Facility: PHYSICIAN GROUP | Age: 71
End: 2021-06-15
Payer: MEDICARE

## 2021-06-15 VITALS
WEIGHT: 187.7 LBS | TEMPERATURE: 98 F | SYSTOLIC BLOOD PRESSURE: 148 MMHG | OXYGEN SATURATION: 95 % | RESPIRATION RATE: 12 BRPM | BODY MASS INDEX: 28.45 KG/M2 | DIASTOLIC BLOOD PRESSURE: 78 MMHG | HEART RATE: 78 BPM | HEIGHT: 68 IN

## 2021-06-15 DIAGNOSIS — E11.9 TYPE 2 DIABETES MELLITUS WITHOUT COMPLICATION, WITHOUT LONG-TERM CURRENT USE OF INSULIN (HCC): ICD-10-CM

## 2021-06-15 DIAGNOSIS — Z12.12 SCREENING FOR COLORECTAL CANCER: ICD-10-CM

## 2021-06-15 DIAGNOSIS — Z79.4 TYPE 2 DIABETES MELLITUS WITH HYPERGLYCEMIA, WITH LONG-TERM CURRENT USE OF INSULIN (HCC): ICD-10-CM

## 2021-06-15 DIAGNOSIS — I10 ESSENTIAL HYPERTENSION: Chronic | ICD-10-CM

## 2021-06-15 DIAGNOSIS — E11.65 TYPE 2 DIABETES MELLITUS WITH HYPERGLYCEMIA, WITH LONG-TERM CURRENT USE OF INSULIN (HCC): ICD-10-CM

## 2021-06-15 DIAGNOSIS — Z12.11 SCREENING FOR COLORECTAL CANCER: ICD-10-CM

## 2021-06-15 PROCEDURE — 99214 OFFICE O/P EST MOD 30 MIN: CPT | Performed by: FAMILY MEDICINE

## 2021-06-15 RX ORDER — HYDROCHLOROTHIAZIDE 25 MG/1
25 TABLET ORAL DAILY
Qty: 90 TABLET | Refills: 3 | Status: SHIPPED | OUTPATIENT
Start: 2021-06-15 | End: 2021-09-01 | Stop reason: SDUPTHER

## 2021-06-15 RX ORDER — INSULIN DEGLUDEC 100 U/ML
30 INJECTION, SOLUTION SUBCUTANEOUS
Qty: 5 EACH | Refills: 5 | Status: SHIPPED | OUTPATIENT
Start: 2021-06-15

## 2021-06-15 ASSESSMENT — ENCOUNTER SYMPTOMS
BLURRED VISION: 0
MUSCULOSKELETAL NEGATIVE: 1
MYALGIAS: 0
BRUISES/BLEEDS EASILY: 0
HEARTBURN: 0
PALPITATIONS: 0
DIZZINESS: 0
NAUSEA: 0
TINGLING: 0
EYES NEGATIVE: 1
HEADACHES: 0
CARDIOVASCULAR NEGATIVE: 1
GASTROINTESTINAL NEGATIVE: 1
FEVER: 0
PSYCHIATRIC NEGATIVE: 1
DEPRESSION: 0
NEUROLOGICAL NEGATIVE: 1
CONSTITUTIONAL NEGATIVE: 1
DOUBLE VISION: 0
CHILLS: 0
HEMOPTYSIS: 0
COUGH: 0
RESPIRATORY NEGATIVE: 1

## 2021-06-15 ASSESSMENT — PATIENT HEALTH QUESTIONNAIRE - PHQ9: CLINICAL INTERPRETATION OF PHQ2 SCORE: 0

## 2021-06-15 ASSESSMENT — FIBROSIS 4 INDEX: FIB4 SCORE: 0.95

## 2021-06-15 NOTE — PROGRESS NOTES
Subjective:      DOMINICK Pulido is a 70 y.o. male who presents with Paperwork (License renewal form )            1. Screening for colorectal cancer    - REFERRAL TO GI FOR COLONOSCOPY    2. Type 2 diabetes mellitus without complication, without long-term current use of insulin (HCC)  Currently treated for DM, taking meds and checking bs at home, trying to do DM diet.controlled  a1c at target  - Comp Metabolic Panel; Future  - Lipid Profile; Future  - Microalbumin Creat Ratio Urine - Lab Collect; Future    3. Essential hypertension  Currently treated for HTN, taking meds with no CP or sob, monitors bp at home periodically. controlled    - hydroCHLOROthiazide (HYDRODIURIL) 25 MG Tab; Take 1 tablet by mouth every day.  Dispense: 90 tablet; Refill: 3    4. Type 2 diabetes mellitus with hyperglycemia, with long-term current use of insulin (HCC)  Seeing pharm  - Insulin Degludec (TRESIBA FLEXTOUCH) 100 UNIT/ML Solution Pen-injector; Inject 30 Units under the skin at bedtime.  Dispense: 5 Each; Refill: 5    Past Medical History:  4/16/2013: Anxiety      Comment:  As needed for anxiety  4/16/2013: Hypertension  4/16/2013: Low testosterone  12/18/2017: Meralgia paraesthetica, right  4/16/2013: PATTI (obstructive sleep apnea)      Comment:  Cannot tolerate sleep apnea  4/16/2013: Restless legs syndrome (RLS)  Past Surgical History:  2011: KNEE REPLACEMENT, TOTAL; Right      Comment:  done in [facility]  2009: KNEE REPLACEMENT, TOTAL; Left      Comment:  done in [facility]  No date: RHINOPLASTY  Social History    Tobacco Use      Smoking status: Never Smoker      Smokeless tobacco: Never Used    Vaping Use      Vaping Use: Never used    Alcohol use: Yes      Alcohol/week: 0.0 oz      Comment: x2 drinks monthly    Drug use: Yes      Types: Marijuana      Comment: occasional     Review of patient's family history indicates:  Problem: Diabetes      Relation: Mother          Age of Onset: (Not  Specified)  Problem: Diabetes      Relation: Father          Age of Onset: (Not Specified)  Problem: Diabetes      Relation: Sister          Age of Onset: (Not Specified)  Problem: Diabetes      Relation: Brother          Age of Onset: (Not Specified)  Problem: No Known Problems      Relation: Brother          Age of Onset: (Not Specified)  Problem: Heart Disease      Relation: Neg Hx          Age of Onset: (Not Specified)  Problem: Heart Failure      Relation: Neg Hx          Age of Onset: (Not Specified)      Current Outpatient Medications: •  hydroCHLOROthiazide (HYDRODIURIL) 25 MG Tab, Take 1 tablet by mouth every day., Disp: 90 tablet, Rfl: 3•  Insulin Degludec (TRESIBA FLEXTOUCH) 100 UNIT/ML Solution Pen-injector, Inject 30 Units under the skin at bedtime., Disp: 5 Each, Rfl: 5•  Accu-Chek Softclix Lancets Misc, USE TO CHECK BLOOD SUGAR LEVELS 5 TIMES DAILY  **DX CODE E11.65**, Disp: 500 Each, Rfl: 1•  amLODIPine (NORVASC) 5 MG Tab, Take 1 tablet by mouth once daily, Disp: 90 tablet, Rfl: 0•  atorvastatin (LIPITOR) 40 MG Tab, Take 1 tablet by mouth once daily, Disp: 90 tablet, Rfl: 0•  Insulin Pen Needle 32G X 6 MM Misc, 1 Each every day., Disp: 100 Each, Rfl: 3 •  lisinopril (PRINIVIL) 40 MG tablet, Take 1 tablet by mouth once daily, Disp: 90 tablet, Rfl: 0•  Empagliflozin (JARDIANCE) 25 MG Tab, Take 1 tablet by mouth every day., Disp: 30 tablet, Rfl: 3•  metFORMIN ER (GLUCOPHAGE XR) 500 MG TABLET SR 24 HR, Take 2 Tablets by mouth 2 times a day., Disp: 360 tablet, Rfl: 3•  Semaglutide, 1 MG/DOSE, (OZEMPIC, 1 MG/DOSE,) 2 MG/1.5ML Solution Pen-injector, Inject 1 mg under the skin every 7 days., Disp: 2 Each, Rfl: 1•  glucose blood (ACCU-CHEK AMY PLUS) strip, 1 Strip by Other route 4 times a day., Disp: 400 Strip, Rfl: 1•  meloxicam (MOBIC) 15 MG tablet, Take 1 Tab by mouth every day., Disp: 30 Tab, Rfl: 2•  aspirin 81 MG tablet, Take 81 mg by mouth every day., Disp: , Rfl:     Patient was instructed on the  "use of medications, either prescriptions or OTC and informed on when the appropriate follow up time period should be. In addition, patient was also instructed that should any acute worsening occur that they should notify this clinic asap or call 911.          Review of Systems   Constitutional: Negative.  Negative for chills and fever.   HENT: Negative.  Negative for hearing loss.    Eyes: Negative.  Negative for blurred vision and double vision.   Respiratory: Negative.  Negative for cough and hemoptysis.    Cardiovascular: Negative.  Negative for chest pain and palpitations.   Gastrointestinal: Negative.  Negative for heartburn and nausea.   Genitourinary: Negative.  Negative for dysuria.   Musculoskeletal: Negative.  Negative for myalgias.   Skin: Negative.  Negative for rash.   Neurological: Negative.  Negative for dizziness, tingling and headaches.   Endo/Heme/Allergies: Negative.  Does not bruise/bleed easily.   Psychiatric/Behavioral: Negative.  Negative for depression and suicidal ideas.   All other systems reviewed and are negative.         Objective:     /78 (BP Location: Left arm, Patient Position: Sitting, BP Cuff Size: Adult)   Pulse 78   Temp 36.7 °C (98 °F) (Temporal)   Resp 12   Ht 1.715 m (5' 7.5\")   Wt 85.1 kg (187 lb 11.2 oz)   SpO2 95%   BMI 28.96 kg/m²      Physical Exam  Vitals and nursing note reviewed.   Constitutional:       General: He is not in acute distress.     Appearance: He is well-developed. He is not diaphoretic.   HENT:      Head: Normocephalic and atraumatic.      Mouth/Throat:      Pharynx: No oropharyngeal exudate.   Eyes:      Pupils: Pupils are equal, round, and reactive to light.   Cardiovascular:      Rate and Rhythm: Normal rate and regular rhythm.      Heart sounds: Normal heart sounds. No murmur heard.   No friction rub. No gallop.    Pulmonary:      Effort: Pulmonary effort is normal. No respiratory distress.      Breath sounds: Normal breath sounds. No " wheezing or rales.   Chest:      Chest wall: No tenderness.   Neurological:      Mental Status: He is alert and oriented to person, place, and time.   Psychiatric:         Behavior: Behavior normal.         Thought Content: Thought content normal.         Judgment: Judgment normal.                        Assessment/Plan:        1. Screening for colorectal cancer    - REFERRAL TO GI FOR COLONOSCOPY    2. Type 2 diabetes mellitus without complication, without long-term current use of insulin ([facility])    - Comp Metabolic Panel; Future  - Lipid Profile; Future  - Microalbumin Creat Ratio Urine - Lab Collect; Future    3. Essential hypertension    - hydroCHLOROthiazide (HYDRODIURIL) 25 MG Tab; Take 1 tablet by mouth every day.  Dispense: 90 tablet; Refill: 3    4. Type 2 diabetes mellitus with hyperglycemia, with long-term current use of insulin ([facility])  - Insulin Degludec (TRESIBA FLEXTOUCH) 100 UNIT/ML Solution Pen-injector; Inject 30 Units under the skin at bedtime.  Dispense: 5 Each; Refill: 5

## 2021-06-16 ENCOUNTER — NON-PROVIDER VISIT (OUTPATIENT)
Dept: MEDICAL GROUP | Facility: PHYSICIAN GROUP | Age: 71
End: 2021-06-16
Payer: MEDICARE

## 2021-06-16 ENCOUNTER — ANTICOAGULATION MONITORING (OUTPATIENT)
Dept: MEDICAL GROUP | Facility: PHYSICIAN GROUP | Age: 71
End: 2021-06-16

## 2021-06-16 DIAGNOSIS — Z79.4 TYPE 2 DIABETES MELLITUS WITH HYPERGLYCEMIA, WITH LONG-TERM CURRENT USE OF INSULIN (HCC): ICD-10-CM

## 2021-06-16 DIAGNOSIS — E11.65 TYPE 2 DIABETES MELLITUS WITH HYPERGLYCEMIA, WITHOUT LONG-TERM CURRENT USE OF INSULIN (HCC): ICD-10-CM

## 2021-06-16 DIAGNOSIS — E11.65 TYPE 2 DIABETES MELLITUS WITH HYPERGLYCEMIA, WITH LONG-TERM CURRENT USE OF INSULIN (HCC): ICD-10-CM

## 2021-06-16 LAB — GLUCOSE BLD-MCNC: 210 MG/DL (ref 70–100)

## 2021-06-16 PROCEDURE — 82962 GLUCOSE BLOOD TEST: CPT | Performed by: FAMILY MEDICINE

## 2021-06-16 PROCEDURE — 99402 PREV MED CNSL INDIV APPRX 30: CPT | Performed by: FAMILY MEDICINE

## 2021-06-16 NOTE — NON-PROVIDER
"  Patient Consult Note    TIME IN: 1000    TIME OUT: 1032    Primary care physician: Jaylen Finnegan M.D.    Reason for consult: Management of Controlled Type 2 Diabetes    HPI:  Carlos Pulido is a 70 y.o. old patient who comes in today for evaluation of above stated problem.    Most Recent HbA1c:   Lab Results   Component Value Date/Time    HBA1C 8.0 (A) 04/21/2021 11:25 AM      Lab Results   Component Value Date/Time    CREATININE 1.11 12/26/2019 08:39 AM        Recent Labs     06/16/21  1001   POCGLUCOSE 210*       Diabetes Medication History and Current Regimen  Metformin: 1000mg bid   GLP-1 Agent: Ozempic 0.5mg subQ weekly on sundays  SGLT-2 Inhibitor:  Empagliflozin 25 mg once daily     Basal Insulin: tresiba 35 U daily       Pt has home glucometer and proper testing technique - yes    Pt reports blood sugars:   Other times: 130s-140s    Hypoglycemia awareness - yes  Nocturnal hypoglycemia- none  Hypoglycemia:  None    Pt's treatment of Hypoglycemia - n/a  - 15:15 Rule    Current Exercise - still walking around the neighborhood, recently built patio outside  Exercise Goal - 30 minutes daily    Dietary - Getting back \"on track\" - watching CHO. Reports living with ex-wife right now, who cooks well-balanced meals.       Foot Exam:  Monofilament exam - completed today  Monofilament testing with a 10 gram force: sensation intact: decreased bilaterally.    Visual Inspection: Feet without maceration, ulcers, fissures.  Feet dry.  Pedal pulses: intact bilaterally    Preventative Management  BP regimen (ACE/ARB) - lisinopril 40mg po verna  ASA - 81mg po daily  Statin - atorvastatin 40mg po daily  Last Retinal Scan - last seen 06/11/2021  Last Foot Exam - completed 06/16/2021  Last A1c -   Lab Results   Component Value Date/Time    HBA1C 8.0 (A) 04/21/2021 11:25 AM      Last Microalbuminuria - will order at next visit    xupdated hern[place]    Past Medical History:  Patient Active Problem List    Diagnosis Date Noted "   • Type 2 diabetes mellitus with hyperglycemia, with long-term current use of insulin ([facility]) 03/13/2020   • Muscle cramps 09/10/2019   • Hypertension associated with diabetes ([facility]) 04/16/2013   • Restless legs syndrome (RLS) 04/16/2013   • PATTI (obstructive sleep apnea) 04/16/2013   • Anxiety 04/16/2013       Past Surgical History:  Past Surgical History:   Procedure Laterality Date   • KNEE REPLACEMENT, TOTAL Right 2011    done in [facility]   • KNEE REPLACEMENT, TOTAL Left 2009    done in [facility]   • RHINOPLASTY         Allergies:  Amoxicillin and Hydrocodone    Social History:  Social History     Socioeconomic History   • Marital status:      Spouse name: Not on file   • Number of children: Not on file   • Years of education: Not on file   • Highest education level: Not on file   Occupational History   • Not on file   Tobacco Use   • Smoking status: Never Smoker   • Smokeless tobacco: Never Used   Vaping Use   • Vaping Use: Never used   Substance and Sexual Activity   • Alcohol use: Yes     Alcohol/week: 0.0 oz     Comment: x2 drinks monthly   • Drug use: Yes     Types: Marijuana     Comment: occasional    • Sexual activity: Yes     Partners: Female     Comment:    Other Topics Concern   •  Service No   • Blood Transfusions No   • Caffeine Concern No   • Occupational Exposure Yes     Comment: asbestos    • Hobby Hazards No   • Sleep Concern Yes   • Stress Concern No   • Weight Concern Yes   • Special Diet Yes     Comment: less carbs   • Back Care Yes   • Exercise Yes   • Bike Helmet No     Comment: does not ride bike    • Seat Belt Yes   • Self-Exams Yes   Social History Narrative    Retired from construction (pipe line work)     Social Determinants of Health     Financial Resource Strain:    • Difficulty of Paying Living Expenses:    Food Insecurity:    • Worried About Running Out of Food in the Last Year:    • Ran Out of Food in the Last Year:    Transportation Needs:     • Lack of Transportation (Medical):    • Lack of Transportation (Non-Medical):    Physical Activity:    • Days of Exercise per Week:    • Minutes of Exercise per Session:    Stress:    • Feeling of Stress :    Social Connections:    • Frequency of Communication with Friends and Family:    • Frequency of Social Gatherings with Friends and Family:    • Attends Taoism Services:    • Active Member of Clubs or Organizations:    • Attends Club or Organization Meetings:    • Marital Status:    Intimate Partner Violence:    • Fear of Current or Ex-Partner:    • Emotionally Abused:    • Physically Abused:    • Sexually Abused:        Family History:  Family History   Problem Relation Age of Onset   • Diabetes Mother    • Diabetes Father    • Diabetes Sister    • Diabetes Brother    • No Known Problems Brother    • Heart Disease Neg Hx    • Heart Failure Neg Hx        Medications:    Current Outpatient Medications:   •  hydroCHLOROthiazide (HYDRODIURIL) 25 MG Tab, Take 1 tablet by mouth every day., Disp: 90 tablet, Rfl: 3  •  Insulin Degludec (TRESIBA FLEXTOUCH) 100 UNIT/ML Solution Pen-injector, Inject 30 Units under the skin at bedtime., Disp: 5 Each, Rfl: 5  •  Accu-Chek Softclix Lancets Misc, USE TO CHECK BLOOD SUGAR LEVELS 5 TIMES DAILY  **DX CODE E11.65**, Disp: 500 Each, Rfl: 1  •  amLODIPine (NORVASC) 5 MG Tab, Take 1 tablet by mouth once daily, Disp: 90 tablet, Rfl: 0  •  atorvastatin (LIPITOR) 40 MG Tab, Take 1 tablet by mouth once daily, Disp: 90 tablet, Rfl: 0  •  Insulin Pen Needle 32G X 6 MM Misc, 1 Each every day., Disp: 100 Each, Rfl: 3  •  lisinopril (PRINIVIL) 40 MG tablet, Take 1 tablet by mouth once daily, Disp: 90 tablet, Rfl: 0  •  Empagliflozin (JARDIANCE) 25 MG Tab, Take 1 tablet by mouth every day., Disp: 30 tablet, Rfl: 3  •  metFORMIN ER (GLUCOPHAGE XR) 500 MG TABLET SR 24 HR, Take 2 Tablets by mouth 2 times a day., Disp: 360 tablet, Rfl: 3  •  Semaglutide, 1 MG/DOSE, (OZEMPIC, 1 MG/DOSE,) 2  MG/1.5ML Solution Pen-injector, Inject 1 mg under the skin every 7 days., Disp: 2 Each, Rfl: 1  •  glucose blood (ACCU-CHEK AMY PLUS) strip, 1 Strip by Other route 4 times a day., Disp: 400 Strip, Rfl: 1  •  meloxicam (MOBIC) 15 MG tablet, Take 1 Tab by mouth every day., Disp: 30 Tab, Rfl: 2  •  aspirin 81 MG tablet, Take 81 mg by mouth every day., Disp: , Rfl:     Labs: Reviewed    Physical Examination:  Vital signs: There were no vitals taken for this visit. There is no height or weight on file to calculate BMI.    Assessment and Plan:    1. DM2  Patient currently optimized on Metformin, Ozempic, Jardiance, and Tresiba. Samples given at visit today. Will continue current regimen and follow up in 4 weeks .     Pt is visibly upset that he did not qualify for PAP for jardiance, tresiba and ozempic    - Medication changes - None     - Lifestyle changes - Continue to reduce simple CHO in diet and continue daily dedicated walking around the neighborhood.     Return in about 4 weeks (around 7/14/2021).     Hannah Heck, Pharmacy Intern    Chrissy Bethea  06/16/21    CC:   Jaylen Finnegan M.D.

## 2021-07-14 ENCOUNTER — NON-PROVIDER VISIT (OUTPATIENT)
Dept: MEDICAL GROUP | Facility: PHYSICIAN GROUP | Age: 71
End: 2021-07-14
Payer: MEDICARE

## 2021-07-14 ENCOUNTER — ANTICOAGULATION MONITORING (OUTPATIENT)
Dept: MEDICAL GROUP | Facility: PHYSICIAN GROUP | Age: 71
End: 2021-07-14

## 2021-07-14 DIAGNOSIS — E11.9 TYPE 2 DIABETES MELLITUS WITHOUT COMPLICATION, WITHOUT LONG-TERM CURRENT USE OF INSULIN (HCC): ICD-10-CM

## 2021-07-14 LAB — GLUCOSE BLD-MCNC: 197 MG/DL (ref 70–100)

## 2021-07-14 PROCEDURE — 99402 PREV MED CNSL INDIV APPRX 30: CPT | Performed by: FAMILY MEDICINE

## 2021-07-14 PROCEDURE — 99999 POCT GLUCOSE: CPT | Performed by: FAMILY MEDICINE

## 2021-07-14 PROCEDURE — 82962 GLUCOSE BLOOD TEST: CPT

## 2021-07-14 NOTE — NON-PROVIDER
Patient Consult Note    TIME IN: 10:17  TIME OUT: 10:52    Primary care physician: Jaylne Finnegan M.D.    Reason for consult: Management of Uncontrolled Type 2 Diabetes    HPI:  Carlos Pulido is a 71 y.o. old patient who comes in today for evaluation of above stated problem.    Most Recent HbA1c:   Lab Results   Component Value Date/Time    HBA1C 8.0 (A) 2021 11:25 AM      Lab Results   Component Value Date/Time    CREATININE 1.11 2019 08:39 AM        Recent Labs     21  1027   POCGLUCOSE 197*   /78  RR 80  O2: 99  Diabetes Medication History and Current Regimen  Metformin: 1000mg po BID  GLP-1 Agent:  Ozempic 1 mg subq weekly  SGLT-2 Inhibitor:  Empagliflozin 25 mg once daily       Basal Insulin: degludec 30 u at bedtime    Pt has home glucometer and proper testing technique - yes    In office B    Pt reports blood sugars:     Other times: pt does not present with FSBG log.  Pt reports FSBG 130-190      Hypoglycemia awareness - yes  Nocturnal hypoglycemia- none  Hypoglycemia:  None    Pt's treatment of Hypoglycemia - 1 piece of candy  - 15:15 Rule    Current Exercise - pt has no exercise program  Exercise Goal - pt would benefit from daily dedicated walking 30 minutes daily    Dietary - pt does not follow diabetic diet at this time. Pt states he's been eating too much bread, tortillas, and rice, but will try to cut back on it        Foot Exam:  Monofilament exam - current  Monofilament testing with a 10 gram force: sensation intact: decreased bilaterally.    Visual Inspection: Feet without maceration, ulcers, fissures.  Feet dry.  Pedal pulses: intact bilaterally    Preventative Management  BP regimen (ACE/ARB) - lisinopril 40 mg by mouth once daily  ASA - 81 mg by mouth every day  Statin - atorvastatin 40 mg by mouth every day  Last Retinal Scan - current  Last Foot Exam - current  Last A1c -   Lab Results   Component Value Date/Time    HBA1C 8.0 (A) 2021 11:25 AM      Last  Microalbuminuria - current    updated caregaps    Past Medical History:  Patient Active Problem List    Diagnosis Date Noted   • Type 2 diabetes mellitus with hyperglycemia, with long-term current use of insulin ([facility]) 03/13/2020   • Muscle cramps 09/10/2019   • Hypertension associated with diabetes ([facility]) 04/16/2013   • Restless legs syndrome (RLS) 04/16/2013   • PATTI (obstructive sleep apnea) 04/16/2013   • Anxiety 04/16/2013       Past Surgical History:  Past Surgical History:   Procedure Laterality Date   • KNEE REPLACEMENT, TOTAL Right 2011    done in [facility]   • KNEE REPLACEMENT, TOTAL Left 2009    done in [facility]   • RHINOPLASTY         Allergies:  Amoxicillin and Hydrocodone    Social History:  Social History     Socioeconomic History   • Marital status:      Spouse name: Not on file   • Number of children: Not on file   • Years of education: Not on file   • Highest education level: Not on file   Occupational History   • Not on file   Tobacco Use   • Smoking status: Never Smoker   • Smokeless tobacco: Never Used   Vaping Use   • Vaping Use: Never used   Substance and Sexual Activity   • Alcohol use: Yes     Alcohol/week: 0.0 oz     Comment: x2 drinks monthly   • Drug use: Yes     Types: Marijuana     Comment: occasional    • Sexual activity: Yes     Partners: Female     Comment:    Other Topics Concern   •  Service No   • Blood Transfusions No   • Caffeine Concern No   • Occupational Exposure Yes     Comment: asbestos    • Hobby Hazards No   • Sleep Concern Yes   • Stress Concern No   • Weight Concern Yes   • Special Diet Yes     Comment: less carbs   • Back Care Yes   • Exercise Yes   • Bike Helmet No     Comment: does not ride bike    • Seat Belt Yes   • Self-Exams Yes   Social History Narrative    Retired from construction (pipe line work)     Social Determinants of Health     Financial Resource Strain:    • Difficulty of Paying Living Expenses:    Food  Insecurity:    • Worried About Running Out of Food in the Last Year:    • Ran Out of Food in the Last Year:    Transportation Needs:    • Lack of Transportation (Medical):    • Lack of Transportation (Non-Medical):    Physical Activity:    • Days of Exercise per Week:    • Minutes of Exercise per Session:    Stress:    • Feeling of Stress :    Social Connections:    • Frequency of Communication with Friends and Family:    • Frequency of Social Gatherings with Friends and Family:    • Attends Hoahaoism Services:    • Active Member of Clubs or Organizations:    • Attends Club or Organization Meetings:    • Marital Status:    Intimate Partner Violence:    • Fear of Current or Ex-Partner:    • Emotionally Abused:    • Physically Abused:    • Sexually Abused:        Family History:  Family History   Problem Relation Age of Onset   • Diabetes Mother    • Diabetes Father    • Diabetes Sister    • Diabetes Brother    • No Known Problems Brother    • Heart Disease Neg Hx    • Heart Failure Neg Hx        Medications:    Current Outpatient Medications:   •  Insulin Pen Needle 32G X 6 MM Misc, 1 Each every day., Disp: 100 Each, Rfl: 1  •  hydroCHLOROthiazide (HYDRODIURIL) 25 MG Tab, Take 1 tablet by mouth every day., Disp: 90 tablet, Rfl: 3  •  Insulin Degludec (TRESIBA FLEXTOUCH) 100 UNIT/ML Solution Pen-injector, Inject 30 Units under the skin at bedtime., Disp: 5 Each, Rfl: 5  •  Accu-Chek Softclix Lancets Misc, USE TO CHECK BLOOD SUGAR LEVELS 5 TIMES DAILY  **DX CODE E11.65**, Disp: 500 Each, Rfl: 1  •  amLODIPine (NORVASC) 5 MG Tab, Take 1 tablet by mouth once daily, Disp: 90 tablet, Rfl: 0  •  atorvastatin (LIPITOR) 40 MG Tab, Take 1 tablet by mouth once daily, Disp: 90 tablet, Rfl: 0  •  lisinopril (PRINIVIL) 40 MG tablet, Take 1 tablet by mouth once daily, Disp: 90 tablet, Rfl: 0  •  Empagliflozin (JARDIANCE) 25 MG Tab, Take 1 tablet by mouth every day., Disp: 30 tablet, Rfl: 3  •  metFORMIN ER (GLUCOPHAGE XR) 500 MG  TABLET SR 24 HR, Take 2 Tablets by mouth 2 times a day., Disp: 360 tablet, Rfl: 3  •  Semaglutide, 1 MG/DOSE, (OZEMPIC, 1 MG/DOSE,) 2 MG/1.5ML Solution Pen-injector, Inject 1 mg under the skin every 7 days., Disp: 2 Each, Rfl: 1  •  glucose blood (ACCU-CHEK AMY PLUS) strip, 1 Strip by Other route 4 times a day., Disp: 400 Strip, Rfl: 1  •  meloxicam (MOBIC) 15 MG tablet, Take 1 Tab by mouth every day., Disp: 30 Tab, Rfl: 2  •  aspirin 81 MG tablet, Take 81 mg by mouth every day., Disp: , Rfl:     Labs: Reviewed    Physical Examination:  Vital signs: There were no vitals taken for this visit. There is no height or weight on file to calculate BMI.    Assessment and Plan:    1. DM2  Pt is currently optimized on Ozempic 1 mg subq weekly  Pt is currently optimized on Empagliflozin 25 mg by mouth once daily  Insulin degludec 40 u subq every night at bedtime,  FSBG remain elevated, will increase triseba to 45 units at bedtime    - Medication changes - increase triseba to 45 units at bedtime     - Lifestyle changes - pt would benefit from reduced simple carbohydrate consumption.  Pt would benefit from daily dedicated walking 30 minutes daily.  Instead, pt continues to eat tortillas, rice, beans etc.        Return in about 4 weeks (around 8/11/2021).    Chrissy Bethea  07/14/21    CC:   Jaylen Finnegan M.D.

## 2021-08-12 ENCOUNTER — TELEPHONE (OUTPATIENT)
Dept: MEDICAL GROUP | Facility: MEDICAL CENTER | Age: 71
End: 2021-08-12

## 2021-08-12 NOTE — TELEPHONE ENCOUNTER
Pt no showed last DM visit with the pharmacist.  Left VM message to reschedule  Chrissy Bethea, Clinical Pharmacist, CDE, CACP

## 2021-09-01 ENCOUNTER — ANTICOAGULATION MONITORING (OUTPATIENT)
Dept: MEDICAL GROUP | Facility: PHYSICIAN GROUP | Age: 71
End: 2021-09-01
Payer: MEDICARE

## 2021-09-01 ENCOUNTER — ANTICOAGULATION VISIT (OUTPATIENT)
Dept: MEDICAL GROUP | Facility: PHYSICIAN GROUP | Age: 71
End: 2021-09-01
Payer: MEDICARE

## 2021-09-01 DIAGNOSIS — E11.65 TYPE 2 DIABETES MELLITUS WITH HYPERGLYCEMIA, WITH LONG-TERM CURRENT USE OF INSULIN (HCC): ICD-10-CM

## 2021-09-01 DIAGNOSIS — E78.2 MIXED HYPERLIPIDEMIA: ICD-10-CM

## 2021-09-01 DIAGNOSIS — Z79.4 TYPE 2 DIABETES MELLITUS WITH HYPERGLYCEMIA, WITH LONG-TERM CURRENT USE OF INSULIN (HCC): ICD-10-CM

## 2021-09-01 DIAGNOSIS — I10 ESSENTIAL HYPERTENSION: Chronic | ICD-10-CM

## 2021-09-01 LAB
GLUCOSE BLD-MCNC: 221 MG/DL (ref 70–100)
HBA1C MFR BLD: 7.5 % (ref 0–5.6)
INT CON NEG: ABNORMAL
INT CON POS: ABNORMAL

## 2021-09-01 PROCEDURE — 99999 PR NO CHARGE: CPT

## 2021-09-01 PROCEDURE — 82962 GLUCOSE BLOOD TEST: CPT | Performed by: FAMILY MEDICINE

## 2021-09-01 PROCEDURE — 83036 HEMOGLOBIN GLYCOSYLATED A1C: CPT | Performed by: FAMILY MEDICINE

## 2021-09-01 RX ORDER — BLOOD SUGAR DIAGNOSTIC
1 STRIP MISCELLANEOUS 4 TIMES DAILY
Qty: 400 STRIP | Refills: 1 | Status: SHIPPED | OUTPATIENT
Start: 2021-09-01 | End: 2021-09-29 | Stop reason: SDUPTHER

## 2021-09-01 RX ORDER — ATORVASTATIN CALCIUM 40 MG/1
TABLET, FILM COATED ORAL
Qty: 100 TABLET | Refills: 0 | Status: SHIPPED | OUTPATIENT
Start: 2021-09-01

## 2021-09-01 RX ORDER — HYDROCHLOROTHIAZIDE 25 MG/1
25 TABLET ORAL DAILY
Qty: 90 TABLET | Refills: 1 | Status: SHIPPED | OUTPATIENT
Start: 2021-09-01 | End: 2021-09-29 | Stop reason: SDUPTHER

## 2021-09-01 RX ORDER — AMLODIPINE BESYLATE 5 MG/1
TABLET ORAL
Qty: 90 TABLET | Refills: 1 | Status: SHIPPED | OUTPATIENT
Start: 2021-09-01

## 2021-09-01 NOTE — NON-PROVIDER
Patient Consult Note    TIME IN: 1:45pm  TIME OUT: 2:17    Primary care physician: Jaylen Finnegan M.D.    Reason for consult: Management of Uncontrolled Type 2 Diabetes    HPI:  Carlos Pulido is a 71 y.o. old patient who comes in today for evaluation of above stated problem.    Most Recent HbA1c:   Lab Results   Component Value Date/Time    HBA1C 8.0 (A) 04/21/2021 11:25 AM      Lab Results   Component Value Date/Time    CREATININE 1.11 12/26/2019 08:39 AM              Diabetes Medication History and Current Regimen  Metformin: Metformin 1000 mg BID   GLP-1 Agent: Semaglutide 1 mg  SGLT-2 Inhibitor:  Empagliflozin 25 mg once daily     Basal Insulin: Tresiba 30 U    Pt has home glucometer and proper testing technique - Yes, checks them about 4 times a day.    Pt reports blood sugars:   Before Breakfast: 140, 156, 167, 150    Hypoglycemia awareness - is aware and will drink juice  Nocturnal hypoglycemia- has had it once last month  Hypoglycemia:  None      Current Exercise - Tries to walk as much as possible by parking far from the grocery store or walking dog.    Dietary - has a understanding of trying to do a low carb diet.    Pt reports eating:  Breakfast - toast, beans, eggs, potatoes  Snack - beer, pretzels  Lunch - frozen burrito but doesn't eat lunch often  Snack - chips sometimes, pork rines   Dinner - Chicken, steak, [place] toast, salad    Foot Exam:  Monofilament exam - up to date    Preventative Management  BP regimen (ACE/ARB) - Lisinopril 40mg   ASA - 81 mg  Statin - Atorvastatin 40 mg  Last Retinal Scan - up to date  Last Foot Exam - up to date  Last A1c -   Lab Results   Component Value Date/Time    HBA1C 8.0 (A) 04/21/2021 11:25 AM      Last Microalbuminuria - Made an appt to get labs done soon    updated caregaps    Past Medical History:  Patient Active Problem List    Diagnosis Date Noted   • Type 2 diabetes mellitus with hyperglycemia, with long-term current use of insulin ([facility]) 03/13/2020   •  Muscle cramps 09/10/2019   • Hypertension associated with diabetes (HCC) 04/16/2013   • Restless legs syndrome (RLS) 04/16/2013   • PATTI (obstructive sleep apnea) 04/16/2013   • Anxiety 04/16/2013       Past Surgical History:  Past Surgical History:   Procedure Laterality Date   • KNEE REPLACEMENT, TOTAL Right 2011    done in [facility]   • KNEE REPLACEMENT, TOTAL Left 2009    done in [facility]   • RHINOPLASTY         Allergies:  Amoxicillin and Hydrocodone    Social History:  Social History     Socioeconomic History   • Marital status:      Spouse name: Not on file   • Number of children: Not on file   • Years of education: Not on file   • Highest education level: Not on file   Occupational History   • Not on file   Tobacco Use   • Smoking status: Never Smoker   • Smokeless tobacco: Never Used   Vaping Use   • Vaping Use: Never used   Substance and Sexual Activity   • Alcohol use: Yes     Alcohol/week: 0.0 oz     Comment: x2 drinks monthly   • Drug use: Yes     Types: Marijuana     Comment: occasional    • Sexual activity: Yes     Partners: Female     Comment:    Other Topics Concern   •  Service No   • Blood Transfusions No   • Caffeine Concern No   • Occupational Exposure Yes     Comment: asbestos    • Hobby Hazards No   • Sleep Concern Yes   • Stress Concern No   • Weight Concern Yes   • Special Diet Yes     Comment: less carbs   • Back Care Yes   • Exercise Yes   • Bike Helmet No     Comment: does not ride bike    • Seat Belt Yes   • Self-Exams Yes   Social History Narrative    Retired from construction (pipe line work)     Social Determinants of Health     Financial Resource Strain:    • Difficulty of Paying Living Expenses:    Food Insecurity:    • Worried About Running Out of Food in the Last Year:    • Ran Out of Food in the Last Year:    Transportation Needs:    • Lack of Transportation (Medical):    • Lack of Transportation (Non-Medical):    Physical Activity:    •  Days of Exercise per Week:    • Minutes of Exercise per Session:    Stress:    • Feeling of Stress :    Social Connections:    • Frequency of Communication with Friends and Family:    • Frequency of Social Gatherings with Friends and Family:    • Attends Mandaen Services:    • Active Member of Clubs or Organizations:    • Attends Club or Organization Meetings:    • Marital Status:    Intimate Partner Violence:    • Fear of Current or Ex-Partner:    • Emotionally Abused:    • Physically Abused:    • Sexually Abused:        Family History:  Family History   Problem Relation Age of Onset   • Diabetes Mother    • Diabetes Father    • Diabetes Sister    • Diabetes Brother    • No Known Problems Brother    • Heart Disease Neg Hx    • Heart Failure Neg Hx        Medications:    Current Outpatient Medications:   •  Insulin Pen Needle 32G X 6 MM Misc, 1 Each every day., Disp: 100 Each, Rfl: 1  •  hydroCHLOROthiazide (HYDRODIURIL) 25 MG Tab, Take 1 tablet by mouth every day., Disp: 90 tablet, Rfl: 3  •  Insulin Degludec (TRESIBA FLEXTOUCH) 100 UNIT/ML Solution Pen-injector, Inject 30 Units under the skin at bedtime., Disp: 5 Each, Rfl: 5  •  Accu-Chek Softclix Lancets Misc, USE TO CHECK BLOOD SUGAR LEVELS 5 TIMES DAILY  **DX CODE E11.65**, Disp: 500 Each, Rfl: 1  •  amLODIPine (NORVASC) 5 MG Tab, Take 1 tablet by mouth once daily, Disp: 90 tablet, Rfl: 0  •  atorvastatin (LIPITOR) 40 MG Tab, Take 1 tablet by mouth once daily, Disp: 90 tablet, Rfl: 0  •  lisinopril (PRINIVIL) 40 MG tablet, Take 1 tablet by mouth once daily, Disp: 90 tablet, Rfl: 0  •  Empagliflozin (JARDIANCE) 25 MG Tab, Take 1 tablet by mouth every day., Disp: 30 tablet, Rfl: 3  •  metFORMIN ER (GLUCOPHAGE XR) 500 MG TABLET SR 24 HR, Take 2 Tablets by mouth 2 times a day., Disp: 360 tablet, Rfl: 3  •  Semaglutide, 1 MG/DOSE, (OZEMPIC, 1 MG/DOSE,) 2 MG/1.5ML Solution Pen-injector, Inject 1 mg under the skin every 7 days., Disp: 2 Each, Rfl: 1  •  glucose  blood (ACCU-CHEK AMY PLUS) strip, 1 Strip by Other route 4 times a day., Disp: 400 Strip, Rfl: 1  •  meloxicam (MOBIC) 15 MG tablet, Take 1 Tab by mouth every day., Disp: 30 Tab, Rfl: 2  •  aspirin 81 MG tablet, Take 81 mg by mouth every day., Disp: , Rfl:     Labs: Reviewed    Physical Examination:  Vital signs: There were no vitals taken for this visit. There is no height or weight on file to calculate BMI.    Assessment and Plan:    1. DM2  A1c has improved from 8.0 to 7.5  Pt is optimized on :  -Jardiance  -Metformin  -Ozempic    Pt is also using 30U of insulin (Tresiba), pt not yet at goal, will increase to 35 units daiyl    Pt stated that sometimes he runs out of his medications and has to go without them which causes his BG to increase.  FSBG 221 in the office post prandial      - Medication changes -  Increased insulin to 35U     - Lifestyle changes - Patient states that he is trying to make the right lifestyle changes to help lower is BG but due to culture background it is hard to make more appropriate changes to his diet.     No follow-ups on file.    Robb Duque, Pharmacy Intern    I have reviewed and concur with the above plan on 9/1/2021.        Chrissy Bethea  09/01/21    CC:   Jaylen Finnegan M.D.      Jardiance, ozempic and triseba samples given

## 2021-09-01 NOTE — PROGRESS NOTES
Patient Consult Note    TIME IN: 1:45pm  TIME OUT: 2:20 pm    Primary care physician: Jaylen Finnegan M.D.    Reason for consult: Management of Uncontrolled Type 2 Diabetes    HPI:  Carlos Pulido is a 71 y.o. old patient who comes in today for evaluation of above stated problem.    Most Recent HbA1c:   Lab Results   Component Value Date/Time    HBA1C 8.0 (A) 04/21/2021 11:25 AM      Lab Results   Component Value Date/Time    CREATININE 1.11 12/26/2019 08:39 AM              Diabetes Medication History and Current Regimen  Metformin: Metformin 500 mg BID   GLP-1 Agent: Ozempic 1mg   SGLT-2 Inhibitor:  Empagliflozin 25 mg once daily     Basal Insulin: Tresiba 30 U    Pt has home glucometer and proper testing technique - Yes, Pt knows how to use it and tries to check it 4 times daily.    Pt reports blood sugars:   Before Breakfast: 140 156 165 160    Hypoglycemia awareness - knows the S/Sx of low BG  Nocturnal hypoglycemia- had one incident last month  Hypoglycemia:  None    Current Exercise - Tries to park far away at the grocery stores to increase walking regimen and will walk dog.    Dietary - Pt knows that he should be eating a low carb diet    Pt reports eating:  Breakfast - eggs, toast, beans  Snack - beer, pretzel  Lunch - burrito but states that doesn't eat lunch often  Snack - chips and pork rines  Dinner - steak, chicken, potatoes, [place] toast    Foot Exam:  Monofilament exam - up to date    Preventative Management  BP regimen (ACE/ARB) - Lisinopril 40 mg  ASA - 81 mg  Statin - Atorvastatin 40 mg  Last Retinal Scan - up to date  Last Foot Exam - up to date  Last A1c -   Lab Results   Component Value Date/Time    HBA1C 8.0 (A) 04/21/2021 11:25 AM      Last Microalbuminuria - Labs coming up     updated caregaps    Past Medical History:  Patient Active Problem List    Diagnosis Date Noted   • Type 2 diabetes mellitus with hyperglycemia, with long-term current use of insulin ([facility]) 03/13/2020   • Muscle cramps  09/10/2019   • Hypertension associated with diabetes (HCC) 04/16/2013   • Restless legs syndrome (RLS) 04/16/2013   • PATTI (obstructive sleep apnea) 04/16/2013   • Anxiety 04/16/2013       Past Surgical History:  Past Surgical History:   Procedure Laterality Date   • KNEE REPLACEMENT, TOTAL Right 2011    done in [facility]   • KNEE REPLACEMENT, TOTAL Left 2009    done in [facility]   • RHINOPLASTY         Allergies:  Amoxicillin and Hydrocodone    Social History:  Social History     Socioeconomic History   • Marital status:      Spouse name: Not on file   • Number of children: Not on file   • Years of education: Not on file   • Highest education level: Not on file   Occupational History   • Not on file   Tobacco Use   • Smoking status: Never Smoker   • Smokeless tobacco: Never Used   Vaping Use   • Vaping Use: Never used   Substance and Sexual Activity   • Alcohol use: Yes     Alcohol/week: 0.0 oz     Comment: x2 drinks monthly   • Drug use: Yes     Types: Marijuana     Comment: occasional    • Sexual activity: Yes     Partners: Female     Comment:    Other Topics Concern   •  Service No   • Blood Transfusions No   • Caffeine Concern No   • Occupational Exposure Yes     Comment: asbestos    • Hobby Hazards No   • Sleep Concern Yes   • Stress Concern No   • Weight Concern Yes   • Special Diet Yes     Comment: less carbs   • Back Care Yes   • Exercise Yes   • Bike Helmet No     Comment: does not ride bike    • Seat Belt Yes   • Self-Exams Yes   Social History Narrative    Retired from construction (pipe line work)     Social Determinants of Health     Financial Resource Strain:    • Difficulty of Paying Living Expenses:    Food Insecurity:    • Worried About Running Out of Food in the Last Year:    • Ran Out of Food in the Last Year:    Transportation Needs:    • Lack of Transportation (Medical):    • Lack of Transportation (Non-Medical):    Physical Activity:    • Days of  Exercise per Week:    • Minutes of Exercise per Session:    Stress:    • Feeling of Stress :    Social Connections:    • Frequency of Communication with Friends and Family:    • Frequency of Social Gatherings with Friends and Family:    • Attends Orthodox Services:    • Active Member of Clubs or Organizations:    • Attends Club or Organization Meetings:    • Marital Status:    Intimate Partner Violence:    • Fear of Current or Ex-Partner:    • Emotionally Abused:    • Physically Abused:    • Sexually Abused:        Family History:  Family History   Problem Relation Age of Onset   • Diabetes Mother    • Diabetes Father    • Diabetes Sister    • Diabetes Brother    • No Known Problems Brother    • Heart Disease Neg Hx    • Heart Failure Neg Hx        Medications:    Current Outpatient Medications:   •  Insulin Pen Needle 32G X 6 MM Misc, 1 Each every day., Disp: 100 Each, Rfl: 1  •  hydroCHLOROthiazide (HYDRODIURIL) 25 MG Tab, Take 1 tablet by mouth every day., Disp: 90 tablet, Rfl: 3  •  Insulin Degludec (TRESIBA FLEXTOUCH) 100 UNIT/ML Solution Pen-injector, Inject 30 Units under the skin at bedtime., Disp: 5 Each, Rfl: 5  •  Accu-Chek Softclix Lancets Misc, USE TO CHECK BLOOD SUGAR LEVELS 5 TIMES DAILY  **DX CODE E11.65**, Disp: 500 Each, Rfl: 1  •  amLODIPine (NORVASC) 5 MG Tab, Take 1 tablet by mouth once daily, Disp: 90 tablet, Rfl: 0  •  atorvastatin (LIPITOR) 40 MG Tab, Take 1 tablet by mouth once daily, Disp: 90 tablet, Rfl: 0  •  lisinopril (PRINIVIL) 40 MG tablet, Take 1 tablet by mouth once daily, Disp: 90 tablet, Rfl: 0  •  Empagliflozin (JARDIANCE) 25 MG Tab, Take 1 tablet by mouth every day., Disp: 30 tablet, Rfl: 3  •  metFORMIN ER (GLUCOPHAGE XR) 500 MG TABLET SR 24 HR, Take 2 Tablets by mouth 2 times a day., Disp: 360 tablet, Rfl: 3  •  Semaglutide, 1 MG/DOSE, (OZEMPIC, 1 MG/DOSE,) 2 MG/1.5ML Solution Pen-injector, Inject 1 mg under the skin every 7 days., Disp: 2 Each, Rfl: 1  •  glucose blood  "(ACCU-CHEK AMY PLUS) strip, 1 Strip by Other route 4 times a day., Disp: 400 Strip, Rfl: 1  •  meloxicam (MOBIC) 15 MG tablet, Take 1 Tab by mouth every day., Disp: 30 Tab, Rfl: 2  •  aspirin 81 MG tablet, Take 81 mg by mouth every day., Disp: , Rfl:     Labs: Reviewed    Physical Examination:  Vital signs: There were no vitals taken for this visit. There is no height or weight on file to calculate BMI.    Assessment and Plan:    1. DM2  Pt is optimized on:  -Jardiance  -Ozempic  -Metformin    Pt is also using 30U of insulin (tresiba)    Pt states that he sometimes runs out of his \"expensive medications\" which then causes his BG to increase again.    Pt FBG = 221 (had eaten right before he came)    a1c has improved from 8.0 to 7.5 today.      - Medication changes - Increasing Tresiba to 35U daily     - Lifestyle changes - Pt continues to try and make the right lifestyle changes to help with his diabetes but due to cultural background it has been hard to make diet changes.    No follow-ups on file.    Robb Duque, Pharmacy Intern    Chrissy JELANI Bethea  09/01/21    CC:   Jaylen Finnegan M.D.      "

## 2021-09-07 ENCOUNTER — HOSPITAL ENCOUNTER (OUTPATIENT)
Dept: LAB | Facility: MEDICAL CENTER | Age: 71
End: 2021-09-07
Attending: FAMILY MEDICINE
Payer: MEDICARE

## 2021-09-07 DIAGNOSIS — E11.9 TYPE 2 DIABETES MELLITUS WITHOUT COMPLICATION, WITHOUT LONG-TERM CURRENT USE OF INSULIN (HCC): ICD-10-CM

## 2021-09-07 LAB
ALBUMIN SERPL BCP-MCNC: 3.7 G/DL (ref 3.2–4.9)
ALBUMIN/GLOB SERPL: 1.1 G/DL
ALP SERPL-CCNC: 97 U/L (ref 30–99)
ALT SERPL-CCNC: 18 U/L (ref 2–50)
ANION GAP SERPL CALC-SCNC: 12 MMOL/L (ref 7–16)
AST SERPL-CCNC: 19 U/L (ref 12–45)
BILIRUB SERPL-MCNC: 0.4 MG/DL (ref 0.1–1.5)
BUN SERPL-MCNC: 14 MG/DL (ref 8–22)
CALCIUM SERPL-MCNC: 8.9 MG/DL (ref 8.5–10.5)
CHLORIDE SERPL-SCNC: 104 MMOL/L (ref 96–112)
CHOLEST SERPL-MCNC: 129 MG/DL (ref 100–199)
CO2 SERPL-SCNC: 25 MMOL/L (ref 20–33)
CREAT SERPL-MCNC: 1.3 MG/DL (ref 0.5–1.4)
FASTING STATUS PATIENT QL REPORTED: NORMAL
GLOBULIN SER CALC-MCNC: 3.3 G/DL (ref 1.9–3.5)
GLUCOSE SERPL-MCNC: 140 MG/DL (ref 65–99)
HDLC SERPL-MCNC: 51 MG/DL
LDLC SERPL CALC-MCNC: 55 MG/DL
POTASSIUM SERPL-SCNC: 3.9 MMOL/L (ref 3.6–5.5)
PROT SERPL-MCNC: 7 G/DL (ref 6–8.2)
SODIUM SERPL-SCNC: 141 MMOL/L (ref 135–145)
TRIGL SERPL-MCNC: 113 MG/DL (ref 0–149)

## 2021-09-07 PROCEDURE — 82570 ASSAY OF URINE CREATININE: CPT

## 2021-09-07 PROCEDURE — 80061 LIPID PANEL: CPT

## 2021-09-07 PROCEDURE — 82043 UR ALBUMIN QUANTITATIVE: CPT

## 2021-09-07 PROCEDURE — 36415 COLL VENOUS BLD VENIPUNCTURE: CPT

## 2021-09-07 PROCEDURE — 80053 COMPREHEN METABOLIC PANEL: CPT

## 2021-09-08 LAB
CREAT UR-MCNC: 75.09 MG/DL
MICROALBUMIN UR-MCNC: 163.2 MG/DL
MICROALBUMIN/CREAT UR: 2173 MG/G (ref 0–30)

## 2021-09-29 ENCOUNTER — NON-PROVIDER VISIT (OUTPATIENT)
Dept: MEDICAL GROUP | Facility: PHYSICIAN GROUP | Age: 71
End: 2021-09-29

## 2021-09-29 ENCOUNTER — ANTICOAGULATION MONITORING (OUTPATIENT)
Dept: MEDICAL GROUP | Facility: PHYSICIAN GROUP | Age: 71
End: 2021-09-29

## 2021-09-29 DIAGNOSIS — E11.65 TYPE 2 DIABETES MELLITUS WITH HYPERGLYCEMIA, WITH LONG-TERM CURRENT USE OF INSULIN (HCC): ICD-10-CM

## 2021-09-29 DIAGNOSIS — Z79.4 TYPE 2 DIABETES MELLITUS WITH HYPERGLYCEMIA, WITH LONG-TERM CURRENT USE OF INSULIN (HCC): ICD-10-CM

## 2021-09-29 DIAGNOSIS — I10 ESSENTIAL HYPERTENSION: Chronic | ICD-10-CM

## 2021-09-29 LAB — GLUCOSE BLD-MCNC: 224 MG/DL (ref 70–100)

## 2021-09-29 PROCEDURE — 82962 GLUCOSE BLOOD TEST: CPT | Performed by: FAMILY MEDICINE

## 2021-09-29 PROCEDURE — 99211 OFF/OP EST MAY X REQ PHY/QHP: CPT | Performed by: FAMILY MEDICINE

## 2021-09-29 RX ORDER — HYDROCHLOROTHIAZIDE 25 MG/1
25 TABLET ORAL DAILY
Qty: 90 TABLET | Refills: 1 | Status: SHIPPED | OUTPATIENT
Start: 2021-09-29

## 2021-09-29 RX ORDER — BLOOD SUGAR DIAGNOSTIC
1 STRIP MISCELLANEOUS 3 TIMES DAILY
Qty: 300 STRIP | Refills: 1 | Status: SHIPPED | OUTPATIENT
Start: 2021-09-29

## 2021-09-29 NOTE — NON-PROVIDER
Patient Consult Note    TIME IN: 7:45  TIME OUT: 8:32    Primary care physician: Jaylen Finnegan M.D.    Reason for consult: Management of Uncontrolled Type 2 Diabetes    HPI:  Carlos Pulido is a 71 y.o. old patient who comes in today for evaluation of above stated problem.    Most Recent HbA1c:   Lab Results   Component Value Date/Time    HBA1C 7.5 (A) 09/01/2021 02:25 PM      Lab Results   Component Value Date/Time    CREATININE 1.30 09/07/2021 08:58 AM        Recent Labs     09/29/21  0826   POCGLUCOSE 224*       Diabetes Medication History and Current Regimen  Metformin: 1000mg po bid   GLP-1 Agent: Ozempic 1mg sub q weekly  SGLT-2 Inhibitor:  Empagliflozin 25 mg once daily       Basal Insulin: Tresiba 35 units daily      Pt has home glucometer and proper testing technique - yes    Pt reports blood sugars:     Other times: pt does not present with his FSBG log    Hypoglycemia awareness - yes  Nocturnal hypoglycemia- none  Hypoglycemia:  None    Pt's treatment of Hypoglycemia - n/a  - 15:15 Rule    Current Exercise - none pt sometimes walks with his neighbor 30 minutes maybe twice a week  Exercise Goal - pt would benefit from daily dedicated walking 30 minutes     Dietary - low carbohydrate        Foot Exam:  Monofilament exam - current  Monofilament testing with a 10 gram force: sensation intact: decreased bilaterally.    Visual Inspection: Feet without maceration, ulcers, fissures.  Feet dry.  Pedal pulses: intact bilaterally    Preventative Management  BP regimen (ACE/ARB) - lisinopril 40mg po daily  ASA - 81mg po daily  Statin - atorva 40mg po daily  Last Retinal Scan - current  Last Foot Exam - current  Last A1c -   Lab Results   Component Value Date/Time    HBA1C 7.5 (A) 09/01/2021 02:25 PM      Last Microalbuminuria - current    xupdated caregaps    Past Medical History:  Patient Active Problem List    Diagnosis Date Noted   • Type 2 diabetes mellitus with hyperglycemia, with long-term current use of  insulin ([facility]) 03/13/2020   • Muscle cramps 09/10/2019   • Hypertension associated with diabetes (HCC) 04/16/2013   • Restless legs syndrome (RLS) 04/16/2013   • PATTI (obstructive sleep apnea) 04/16/2013   • Anxiety 04/16/2013       Past Surgical History:  Past Surgical History:   Procedure Laterality Date   • KNEE REPLACEMENT, TOTAL Right 2011    done in [facility]   • KNEE REPLACEMENT, TOTAL Left 2009    done in [facility]   • RHINOPLASTY         Allergies:  Amoxicillin and Hydrocodone    Social History:  Social History     Socioeconomic History   • Marital status:      Spouse name: Not on file   • Number of children: Not on file   • Years of education: Not on file   • Highest education level: Not on file   Occupational History   • Not on file   Tobacco Use   • Smoking status: Never Smoker   • Smokeless tobacco: Never Used   Vaping Use   • Vaping Use: Never used   Substance and Sexual Activity   • Alcohol use: Yes     Alcohol/week: 0.0 oz     Comment: x2 drinks monthly   • Drug use: Yes     Types: Marijuana     Comment: occasional    • Sexual activity: Yes     Partners: Female     Comment:    Other Topics Concern   •  Service No   • Blood Transfusions No   • Caffeine Concern No   • Occupational Exposure Yes     Comment: asbestos    • Hobby Hazards No   • Sleep Concern Yes   • Stress Concern No   • Weight Concern Yes   • Special Diet Yes     Comment: less carbs   • Back Care Yes   • Exercise Yes   • Bike Helmet No     Comment: does not ride bike    • Seat Belt Yes   • Self-Exams Yes   Social History Narrative    Retired from construction (pipe line work)     Social Determinants of Health     Financial Resource Strain:    • Difficulty of Paying Living Expenses:    Food Insecurity:    • Worried About Running Out of Food in the Last Year:    • Ran Out of Food in the Last Year:    Transportation Needs:    • Lack of Transportation (Medical):    • Lack of Transportation  (Non-Medical):    Physical Activity:    • Days of Exercise per Week:    • Minutes of Exercise per Session:    Stress:    • Feeling of Stress :    Social Connections:    • Frequency of Communication with Friends and Family:    • Frequency of Social Gatherings with Friends and Family:    • Attends Yazdanism Services:    • Active Member of Clubs or Organizations:    • Attends Club or Organization Meetings:    • Marital Status:    Intimate Partner Violence:    • Fear of Current or Ex-Partner:    • Emotionally Abused:    • Physically Abused:    • Sexually Abused:        Family History:  Family History   Problem Relation Age of Onset   • Diabetes Mother    • Diabetes Father    • Diabetes Sister    • Diabetes Brother    • No Known Problems Brother    • Heart Disease Neg Hx    • Heart Failure Neg Hx        Medications:    Current Outpatient Medications:   •  glucose blood (ACCU-CHEK AMY PLUS) strip, 1 Strip by Other route in the morning, at noon, and at bedtime., Disp: 300 Strip, Rfl: 1  •  hydroCHLOROthiazide (HYDRODIURIL) 25 MG Tab, Take 1 Tablet by mouth every day., Disp: 90 Tablet, Rfl: 1  •  atorvastatin (LIPITOR) 40 MG Tab, Take 1 tablet by mouth once daily, Disp: 100 Tablet, Rfl: 0  •  amLODIPine (NORVASC) 5 MG Tab, Take 1 tablet by mouth once daily, Disp: 90 Tablet, Rfl: 1  •  Insulin Pen Needle 32G X 6 MM Misc, 1 Each every day., Disp: 100 Each, Rfl: 1  •  Insulin Degludec (TRESIBA FLEXTOUCH) 100 UNIT/ML Solution Pen-injector, Inject 30 Units under the skin at bedtime., Disp: 5 Each, Rfl: 5  •  Accu-Chek Softclix Lancets Misc, USE TO CHECK BLOOD SUGAR LEVELS 5 TIMES DAILY  **DX CODE E11.65**, Disp: 500 Each, Rfl: 1  •  lisinopril (PRINIVIL) 40 MG tablet, Take 1 tablet by mouth once daily, Disp: 90 tablet, Rfl: 0  •  Empagliflozin (JARDIANCE) 25 MG Tab, Take 1 tablet by mouth every day., Disp: 30 tablet, Rfl: 3  •  metFORMIN ER (GLUCOPHAGE XR) 500 MG TABLET SR 24 HR, Take 2 Tablets by mouth 2 times a day., Disp:  360 tablet, Rfl: 3  •  Semaglutide, 1 MG/DOSE, (OZEMPIC, 1 MG/DOSE,) 2 MG/1.5ML Solution Pen-injector, Inject 1 mg under the skin every 7 days., Disp: 2 Each, Rfl: 1  •  meloxicam (MOBIC) 15 MG tablet, Take 1 Tab by mouth every day., Disp: 30 Tab, Rfl: 2  •  aspirin 81 MG tablet, Take 81 mg by mouth every day., Disp: , Rfl:     Labs: Reviewed    Physical Examination:  Vital signs: There were no vitals taken for this visit. There is no height or weight on file to calculate BMI.    Assessment and Plan:    1. DM2  Last A1c was 7.5.  PT  is tolerating Tresiba 35 units at bedtime  FSBG 224 in the office today.  Pt had sourdough toast this morning.  Lifestyle changes reviewed.    microalbumin creatinine is elevated 2173.  Referred to pcpu    - Medication changes - none     - Lifestyle changes - continue to reduce simple carbohydrate comsumption, and increase intensity and duration of exercise.      No follow-ups on file.    Chrissy Bethea  09/29/21    CC:   Jaylen Finnegan M.D.

## 2021-10-04 ENCOUNTER — OFFICE VISIT (OUTPATIENT)
Dept: MEDICAL GROUP | Facility: PHYSICIAN GROUP | Age: 71
End: 2021-10-04
Payer: MEDICARE

## 2021-10-04 VITALS
HEART RATE: 95 BPM | DIASTOLIC BLOOD PRESSURE: 62 MMHG | SYSTOLIC BLOOD PRESSURE: 128 MMHG | WEIGHT: 191 LBS | RESPIRATION RATE: 18 BRPM | HEIGHT: 67 IN | OXYGEN SATURATION: 97 % | BODY MASS INDEX: 29.98 KG/M2 | TEMPERATURE: 97.2 F

## 2021-10-04 DIAGNOSIS — Z79.4 TYPE 2 DIABETES MELLITUS WITH HYPERGLYCEMIA, WITH LONG-TERM CURRENT USE OF INSULIN (HCC): ICD-10-CM

## 2021-10-04 DIAGNOSIS — E11.59 HYPERTENSION ASSOCIATED WITH DIABETES (HCC): Chronic | ICD-10-CM

## 2021-10-04 DIAGNOSIS — E78.2 MIXED HYPERLIPIDEMIA: ICD-10-CM

## 2021-10-04 DIAGNOSIS — E11.22 CHRONIC KIDNEY DISEASE DUE TO TYPE 2 DIABETES MELLITUS (HCC): ICD-10-CM

## 2021-10-04 DIAGNOSIS — E11.65 TYPE 2 DIABETES MELLITUS WITH HYPERGLYCEMIA, WITH LONG-TERM CURRENT USE OF INSULIN (HCC): ICD-10-CM

## 2021-10-04 DIAGNOSIS — E11.42 DIABETIC PERIPHERAL NEUROPATHY ASSOCIATED WITH TYPE 2 DIABETES MELLITUS (HCC): ICD-10-CM

## 2021-10-04 DIAGNOSIS — I15.2 HYPERTENSION ASSOCIATED WITH DIABETES (HCC): Chronic | ICD-10-CM

## 2021-10-04 PROCEDURE — 99214 OFFICE O/P EST MOD 30 MIN: CPT | Performed by: PHYSICIAN ASSISTANT

## 2021-10-04 RX ORDER — GABAPENTIN 300 MG/1
300 CAPSULE ORAL EVERY EVENING
Qty: 30 CAPSULE | Refills: 0 | Status: SHIPPED | OUTPATIENT
Start: 2021-10-04 | End: 2021-11-29

## 2021-10-04 ASSESSMENT — FIBROSIS 4 INDEX: FIB4 SCORE: 1.27

## 2021-10-04 NOTE — PROGRESS NOTES
CC:    Chief Complaint   Patient presents with   • Establish Care       HISTORY OF THE PRESENT ILLNESS: Patient is a 71 y.o. male presenting to establish primary care     1. Pt is diabetic with most recent A1C of 7.5%. he is on tresiba 30U every bedtime. Also takes metformin, jardiance, and ozempic. Managed by Chrissy Bethea.   2. Pt has CKD associated with DM. Currently on lisinopril.   3. Pt having hard stools.   4. Pt having pain in his fingertips and toes. Has been occurring for several years. Never treated for it. Has been occurring often and is worse at night. Has been affect    No problem-specific Assessment & Plan notes found for this encounter.    Allergies: Amoxicillin and Hydrocodone    Current Outpatient Medications Ordered in Epic   Medication Sig Dispense Refill   • glucose blood (ACCU-CHEK AMY PLUS) strip 1 Strip by Other route in the morning, at noon, and at bedtime. 300 Strip 1   • hydroCHLOROthiazide (HYDRODIURIL) 25 MG Tab Take 1 Tablet by mouth every day. 90 Tablet 1   • atorvastatin (LIPITOR) 40 MG Tab Take 1 tablet by mouth once daily 100 Tablet 0   • amLODIPine (NORVASC) 5 MG Tab Take 1 tablet by mouth once daily 90 Tablet 1   • Insulin Pen Needle 32G X 6 MM Misc 1 Each every day. 100 Each 1   • Insulin Degludec (TRESIBA FLEXTOUCH) 100 UNIT/ML Solution Pen-injector Inject 30 Units under the skin at bedtime. 5 Each 5   • Accu-Chek Softclix Lancets Misc USE TO CHECK BLOOD SUGAR LEVELS 5 TIMES DAILY  **DX CODE E11.65** 500 Each 1   • lisinopril (PRINIVIL) 40 MG tablet Take 1 tablet by mouth once daily 90 tablet 0   • metFORMIN ER (GLUCOPHAGE XR) 500 MG TABLET SR 24 HR Take 2 Tablets by mouth 2 times a day. 360 tablet 3   • Semaglutide, 1 MG/DOSE, (OZEMPIC, 1 MG/DOSE,) 2 MG/1.5ML Solution Pen-injector Inject 1 mg under the skin every 7 days. 2 Each 1   • meloxicam (MOBIC) 15 MG tablet Take 1 Tab by mouth every day. 30 Tab 2   • aspirin 81 MG tablet Take 81 mg by mouth every day.     •  Empagliflozin (JARDIANCE) 25 MG Tab Take 1 tablet by mouth every day. (Patient not taking: Reported on 10/4/2021) 30 tablet 3     No current Epic-ordered facility-administered medications on file.       Past Medical History:   Diagnosis Date   • Anxiety 4/16/2013    As needed for anxiety   • Hypertension 4/16/2013   • Low testosterone 4/16/2013   • Meralgia paraesthetica, right 12/18/2017   • PATTI (obstructive sleep apnea) 4/16/2013    Cannot tolerate sleep apnea   • Restless legs syndrome (RLS) 4/16/2013       Past Surgical History:   Procedure Laterality Date   • KNEE REPLACEMENT, TOTAL Right 2011    done in [facility]   • KNEE REPLACEMENT, TOTAL Left 2009    done in [facility]   • RHINOPLASTY         Social History     Tobacco Use   • Smoking status: Never Smoker   • Smokeless tobacco: Never Used   Vaping Use   • Vaping Use: Never used   Substance Use Topics   • Alcohol use: Yes     Alcohol/week: 0.0 oz     Comment: x2 drinks monthly   • Drug use: Yes     Types: Marijuana     Comment: occasional        Social History     Social History Narrative    Retired from construction (pipe line work)       Family History   Problem Relation Age of Onset   • Diabetes Mother    • Diabetes Father    • Diabetes Sister    • Diabetes Brother    • No Known Problems Brother    • Heart Disease Neg Hx    • Heart Failure Neg Hx        ROS:     - Constitutional: Negative for fever, chills, unexpected weight change, and fatigue/generalized weakness.     - HEENT: Negative for headaches, vision changes, hearing changes, ear pain, ear discharge, rhinorrhea, sinus congestion, sore throat, and neck pain.      - Respiratory: Negative for cough, sputum production, chest congestion, dyspnea, wheezing, and crackles.      - Cardiovascular: Negative for chest pain, palpitations, orthopnea, and bilateral lower extremity edema.     - Gastrointestinal: Negative for heartburn, nausea, vomiting, abdominal pain, hematochezia, melena,  "diarrhea, constipation, and greasy/foul-smelling stools.     - Genitourinary: Positive for frequency.  Negative for dysuria, hematuria, pyuria, urinary urgency, and urinary incontinence.     - Musculoskeletal: Negative for myalgias, back pain, and joint pain.     - Skin: Negative for rash, itching, cyanotic skin color change.     - Neurological:Positive for paresthesias.  Negative for dizziness, tingling, tremors, focal sensory deficit, focal weakness and headaches.     - Endo/Heme/Allergies: Does not bruise/bleed easily.     - Psychiatric/Behavioral: Negative for depression, suicidal/homicidal ideation and memory loss.            .      Exam: /62   Pulse 95   Temp 36.2 °C (97.2 °F) (Temporal)   Resp 18   Ht 1.702 m (5' 7\")   Wt 86.6 kg (191 lb)   SpO2 97%  Body mass index is 29.91 kg/m².    General: Normal appearing. No acute distress.  Skin: Warm and dry.  No obvious lesions.  HEENT: Normocephalic. Eyes conjunctiva clear lids without ptosis, ears normal shape and contour  Cardiovascular: Regular rate and rhythm without murmur.   Respiratory: Clear to auscultation bilaterally, no rhonchi wheezing or rales.  Neurologic: Grossly nonfocal, A&O x3, gait normal,  Musculoskeletal: No deformity or swelling.   Extremities: No extremity cyanosis, clubbing, or edema.  Psych: Normal mood and affect. Judgment and insight is normal.    Please note that this dictation was created using voice recognition software. I have made every reasonable attempt to correct obvious errors, but I expect that there are errors of grammar and possibly content that I did not discover before finalizing the note.  LABS: 10/4/2021: Results reviewed and discussed with the patient, questions answered.      Assessment/Plan   1. Diabetic peripheral neuropathy associated with type 2 diabetes mellitus (HCC)  Will trial on gabapentin in the evenings and review at next visit.   - gabapentin (NEURONTIN) 300 MG Cap; Take 1 Capsule by mouth every " evening for 30 days.  Dispense: 30 Capsule; Refill: 0    2. Chronic kidney disease due to type 2 diabetes mellitus ([facility])  Has decreased GFR and very elevated microalbumin/Cr ratio. Already on ACE-I    3. Type 2 diabetes mellitus with hyperglycemia, with long-term current use of insulin ([facility])  A1C at 7.5%. will continue to f/u with meme filter.     4. Hypertension associated with diabetes ([facility])  Well controlled.     5. Mixed hyperlipidemia  Well controlled.

## 2021-10-27 ENCOUNTER — ANTICOAGULATION MONITORING (OUTPATIENT)
Dept: VASCULAR LAB | Facility: MEDICAL CENTER | Age: 71
End: 2021-10-27

## 2021-10-27 ENCOUNTER — NON-PROVIDER VISIT (OUTPATIENT)
Dept: MEDICAL GROUP | Facility: PHYSICIAN GROUP | Age: 71
End: 2021-10-27
Payer: MEDICARE

## 2021-10-27 ENCOUNTER — ANTICOAGULATION MONITORING (OUTPATIENT)
Dept: MEDICAL GROUP | Facility: PHYSICIAN GROUP | Age: 71
End: 2021-10-27

## 2021-10-27 VITALS — DIASTOLIC BLOOD PRESSURE: 67 MMHG | BODY MASS INDEX: 29.87 KG/M2 | WEIGHT: 190.7 LBS | SYSTOLIC BLOOD PRESSURE: 128 MMHG

## 2021-10-27 DIAGNOSIS — E11.65 TYPE 2 DIABETES MELLITUS WITH HYPERGLYCEMIA, WITH LONG-TERM CURRENT USE OF INSULIN (HCC): ICD-10-CM

## 2021-10-27 DIAGNOSIS — Z79.4 TYPE 2 DIABETES MELLITUS WITH HYPERGLYCEMIA, WITH LONG-TERM CURRENT USE OF INSULIN (HCC): ICD-10-CM

## 2021-10-27 LAB — GLUCOSE BLD-MCNC: 127 MG/DL (ref 70–100)

## 2021-10-27 PROCEDURE — 82962 GLUCOSE BLOOD TEST: CPT | Performed by: FAMILY MEDICINE

## 2021-10-27 PROCEDURE — 99402 PREV MED CNSL INDIV APPRX 30: CPT | Performed by: FAMILY MEDICINE

## 2021-10-27 RX ORDER — EMPAGLIFLOZIN 25 MG/1
1 TABLET, FILM COATED ORAL DAILY
COMMUNITY

## 2021-10-27 ASSESSMENT — FIBROSIS 4 INDEX: FIB4 SCORE: 1.27

## 2021-10-27 NOTE — NON-PROVIDER
Patient Consult Note    TIME IN: 9:40 am  TIME OUT: 10:10 am    Primary care physician: Jaylen Finnegan M.D.    Reason for consult: Management of Uncontrolled Type 2 Diabetes    HPI:  Carlos Pulido is a 71 y.o. old patient who comes in today for evaluation of above stated problem.    Pt is currently on optimized dose of Jardiance and Metformin. Currently on 1 mg of ozempic and 35 U of tresiba.    Pt reports being constipated and advised him to take metamucil plus drink plenty of water. He is currently still eating a  diet but reports trying to eat better. Continues to walk daily for at least 30 min when possible but said he will try to walk more weekly.     Most Recent HbA1c:   Lab Results   Component Value Date/Time    HBA1C 7.5 (A) 09/01/2021 02:25 PM      Lab Results   Component Value Date/Time    CREATININE 1.30 09/07/2021 08:58 AM        Diabetes Medication History and Current Regimen  Metformin: 1000mg po bid   GLP-1 Agent: Ozempic 1mg sub q weekly  SGLT-2 Inhibitor:  Empagliflozin 25 mg once daily       Basal Insulin: Tresiba 35 units daily    Pt has home glucometer and proper testing technique - yes    Pt reports blood sugars:  140-150    Other times: pt does not present with his [facility] log    Hypoglycemia awareness - yes  Nocturnal hypoglycemia- none  Hypoglycemia:  None    Pt's treatment of Hypoglycemia - n/a  - 15:15 Rule    Current Exercise - Tries to walk daily around the house and in his neighborhood.    Exercise Goal - pt would benefit from daily dedicated walking 30 minutes     Dietary -  diet         Foot Exam:  Monofilament exam - current  Monofilament testing with a 10 gram force: sensation intact: decreased bilaterally.    Visual Inspection: Feet without maceration, ulcers, fissures.  Feet dry.  Pedal pulses: intact bilaterally    Preventative Management  BP regimen (ACE/ARB) - lisinopril 40mg po daily  ASA - 81mg po daily  Statin - atorva 40mg po daily  Last Retinal Scan -  current  Last Foot Exam - current  Last A1c -   Lab Results   Component Value Date/Time    HBA1C 7.5 (A) 09/01/2021 02:25 PM      Last Microalbuminuria - current    xupdated caregaps    Past Medical History:  Patient Active Problem List    Diagnosis Date Noted   • Diabetic peripheral neuropathy associated with type 2 diabetes mellitus ([facility]) 10/04/2021   • Chronic kidney disease due to type 2 diabetes mellitus ([facility]) 10/04/2021   • Mixed hyperlipidemia 10/04/2021   • Type 2 diabetes mellitus with hyperglycemia, with long-term current use of insulin ([facility]) 03/13/2020   • Muscle cramps 09/10/2019   • Hypertension associated with diabetes ([facility]) 04/16/2013   • Restless legs syndrome (RLS) 04/16/2013   • PATTI (obstructive sleep apnea) 04/16/2013   • Anxiety 04/16/2013       Past Surgical History:  Past Surgical History:   Procedure Laterality Date   • KNEE REPLACEMENT, TOTAL Right 2011    done in [facility]   • KNEE REPLACEMENT, TOTAL Left 2009    done in [facility]   • RHINOPLASTY         Allergies:  Amoxicillin and Hydrocodone    Social History:  Social History     Socioeconomic History   • Marital status:      Spouse name: Not on file   • Number of children: Not on file   • Years of education: Not on file   • Highest education level: Not on file   Occupational History   • Occupation: retired pipeline worker   Tobacco Use   • Smoking status: Never Smoker   • Smokeless tobacco: Never Used   Vaping Use   • Vaping Use: Never used   Substance and Sexual Activity   • Alcohol use: Yes     Alcohol/week: 0.0 oz     Comment: x2 drinks monthly   • Drug use: Yes     Types: Marijuana     Comment: occasional    • Sexual activity: Yes     Partners: Female     Comment:    Other Topics Concern   •  Service No   • Blood Transfusions No   • Caffeine Concern No   • Occupational Exposure Yes     Comment: asbestos    • Hobby Hazards No   • Sleep Concern Yes   • Stress Concern No   • Weight Concern Yes    • Special Diet Yes     Comment: less carbs   • Back Care Yes   • Exercise Yes   • Bike Helmet No     Comment: does not ride bike    • Seat Belt Yes   • Self-Exams Yes   Social History Narrative    Retired from construction (pipe line work)     Social Determinants of Health     Financial Resource Strain:    • Difficulty of Paying Living Expenses:    Food Insecurity:    • Worried About Running Out of Food in the Last Year:    • Ran Out of Food in the Last Year:    Transportation Needs:    • Lack of Transportation (Medical):    • Lack of Transportation (Non-Medical):    Physical Activity:    • Days of Exercise per Week:    • Minutes of Exercise per Session:    Stress:    • Feeling of Stress :    Social Connections:    • Frequency of Communication with Friends and Family:    • Frequency of Social Gatherings with Friends and Family:    • Attends [employer]ars[employer] Services:    • Active Member of Clubs or Organizations:    • Attends Club or Organization Meetings:    • Marital Status:    Intimate Partner Violence:    • Fear of Current or Ex-Partner:    • Emotionally Abused:    • Physically Abused:    • Sexually Abused:        Family History:  Family History   Problem Relation Age of Onset   • Diabetes Mother    • Diabetes Father    • Diabetes Sister    • Diabetes Brother    • No Known Problems Brother    • Heart Disease Neg Hx    • Heart Failure Neg Hx        Medications:    Current Outpatient Medications:   •  Empagliflozin (JARDIANCE) 25 MG Tab, Take 1 Tablet by mouth every day., Disp: , Rfl:   •  gabapentin (NEURONTIN) 300 MG Cap, Take 1 Capsule by mouth every evening for 30 days., Disp: 30 Capsule, Rfl: 0  •  glucose blood (ACCU-CHEK AMY PLUS) strip, 1 Strip by Other route in the morning, at noon, and at bedtime., Disp: 300 Strip, Rfl: 1  •  hydroCHLOROthiazide (HYDRODIURIL) 25 MG Tab, Take 1 Tablet by mouth every day., Disp: 90 Tablet, Rfl: 1  •  atorvastatin (LIPITOR) 40 MG Tab, Take 1 tablet by mouth once daily, Disp:  100 Tablet, Rfl: 0  •  amLODIPine (NORVASC) 5 MG Tab, Take 1 tablet by mouth once daily, Disp: 90 Tablet, Rfl: 1  •  Insulin Pen Needle 32G X 6 MM Misc, 1 Each every day., Disp: 100 Each, Rfl: 1  •  Insulin Degludec (TRESIBA FLEXTOUCH) 100 UNIT/ML Solution Pen-injector, Inject 30 Units under the skin at bedtime., Disp: 5 Each, Rfl: 5  •  Accu-Chek Softclix Lancets Misc, USE TO CHECK BLOOD SUGAR LEVELS 5 TIMES DAILY  **DX CODE E11.65**, Disp: 500 Each, Rfl: 1  •  lisinopril (PRINIVIL) 40 MG tablet, Take 1 tablet by mouth once daily, Disp: 90 tablet, Rfl: 0  •  metFORMIN ER (GLUCOPHAGE XR) 500 MG TABLET SR 24 HR, Take 2 Tablets by mouth 2 times a day., Disp: 360 tablet, Rfl: 3  •  Semaglutide, 1 MG/DOSE, (OZEMPIC, 1 MG/DOSE,) 2 MG/1.5ML Solution Pen-injector, Inject 1 mg under the skin every 7 days., Disp: 2 Each, Rfl: 1  •  aspirin 81 MG tablet, Take 81 mg by mouth every day., Disp: , Rfl:     Labs: Reviewed    Physical Examination:  Vital signs: /67 (BP Location: Left arm, Patient Position: Sitting, BP Cuff Size: Adult)   Wt 86.5 kg (190 lb 11.2 oz)   BMI 29.87 kg/m²  Body mass index is 29.87 kg/m².    Assessment and Plan:    1. DM2  · Continue to work on diet  · Continue to increase exercise regimen  · Continue all medications  · Get Vitamin D lab with next years labs  · In office     - Medication changes - none     - Lifestyle changes - continue to reduce simple carbohydrate comsumption, and increase intensity and duration of exercise.      Return in about 4 weeks (around 11/24/2021).    Chrissy Bethea     I have reviewed and concur with the above plan on 10/27/2021.  Chrissy Bethea, Clinical Pharmacist, CDE, CACP        CC:   Jaylen Finnegan M.D.

## 2021-11-01 ENCOUNTER — OFFICE VISIT (OUTPATIENT)
Dept: URGENT CARE | Facility: CLINIC | Age: 71
End: 2021-11-01
Payer: MEDICARE

## 2021-11-01 VITALS
DIASTOLIC BLOOD PRESSURE: 78 MMHG | HEART RATE: 87 BPM | TEMPERATURE: 97 F | RESPIRATION RATE: 16 BRPM | BODY MASS INDEX: 29.98 KG/M2 | OXYGEN SATURATION: 97 % | SYSTOLIC BLOOD PRESSURE: 142 MMHG | HEIGHT: 67 IN | WEIGHT: 191 LBS

## 2021-11-01 DIAGNOSIS — K04.7 DENTAL INFECTION: ICD-10-CM

## 2021-11-01 PROCEDURE — 99213 OFFICE O/P EST LOW 20 MIN: CPT | Performed by: NURSE PRACTITIONER

## 2021-11-01 RX ORDER — CLINDAMYCIN HYDROCHLORIDE 300 MG/1
300 CAPSULE ORAL 3 TIMES DAILY
Qty: 21 CAPSULE | Refills: 0 | Status: SHIPPED | OUTPATIENT
Start: 2021-11-01 | End: 2021-11-08

## 2021-11-01 ASSESSMENT — ENCOUNTER SYMPTOMS
FEVER: 0
MYALGIAS: 0
HEADACHES: 0
CHILLS: 0
NAUSEA: 0

## 2021-11-01 ASSESSMENT — FIBROSIS 4 INDEX: FIB4 SCORE: 1.27

## 2021-11-01 NOTE — PROGRESS NOTES
Subjective     DOMINICK Pulido is a 71 y.o. male who presents with Dental Pain (toothache on the L side cheeks, possible infection started a week ago)            HPI   New problem.  Patient is a 71-year-old male who presents with possible dental pain and tooth ache in the left upper aspect of his mouth.  He denies fever, chills, myalgia, headache, or nausea.  He has not taken any medications for his symptoms other than some ibuprofen.  He states this started approximately 1 week ago.  Amoxicillin and Hydrocodone  Current Outpatient Medications on File Prior to Visit   Medication Sig Dispense Refill   • Empagliflozin (JARDIANCE) 25 MG Tab Take 1 Tablet by mouth every day.     • gabapentin (NEURONTIN) 300 MG Cap Take 1 Capsule by mouth every evening for 30 days. 30 Capsule 0   • glucose blood (ACCU-CHEK AMY PLUS) strip 1 Strip by Other route in the morning, at noon, and at bedtime. 300 Strip 1   • hydroCHLOROthiazide (HYDRODIURIL) 25 MG Tab Take 1 Tablet by mouth every day. 90 Tablet 1   • atorvastatin (LIPITOR) 40 MG Tab Take 1 tablet by mouth once daily 100 Tablet 0   • amLODIPine (NORVASC) 5 MG Tab Take 1 tablet by mouth once daily 90 Tablet 1   • Insulin Pen Needle 32G X 6 MM Misc 1 Each every day. 100 Each 1   • Insulin Degludec (TRESIBA FLEXTOUCH) 100 UNIT/ML Solution Pen-injector Inject 30 Units under the skin at bedtime. 5 Each 5   • Accu-Chek Softclix Lancets Misc USE TO CHECK BLOOD SUGAR LEVELS 5 TIMES DAILY  **DX CODE E11.65** 500 Each 1   • lisinopril (PRINIVIL) 40 MG tablet Take 1 tablet by mouth once daily 90 tablet 0   • metFORMIN ER (GLUCOPHAGE XR) 500 MG TABLET SR 24 HR Take 2 Tablets by mouth 2 times a day. 360 tablet 3   • Semaglutide, 1 MG/DOSE, (OZEMPIC, 1 MG/DOSE,) 2 MG/1.5ML Solution Pen-injector Inject 1 mg under the skin every 7 days. 2 Each 1   • aspirin 81 MG tablet Take 81 mg by mouth every day.       No current facility-administered medications on file prior to visit.     Social History      Socioeconomic History   • Marital status:      Spouse name: Not on file   • Number of children: Not on file   • Years of education: Not on file   • Highest education level: Not on file   Occupational History   • Occupation: retired pipeline worker   Tobacco Use   • Smoking status: Never Smoker   • Smokeless tobacco: Never Used   Vaping Use   • Vaping Use: Never used   Substance and Sexual Activity   • Alcohol use: Yes     Alcohol/week: 0.0 oz     Comment: x2 drinks monthly   • Drug use: Yes     Types: Marijuana     Comment: occasional    • Sexual activity: Yes     Partners: Female     Comment:    Other Topics Concern   •  Service No   • Blood Transfusions No   • Caffeine Concern No   • Occupational Exposure Yes     Comment: asbestos    • Hobby Hazards No   • Sleep Concern Yes   • Stress Concern No   • Weight Concern Yes   • Special Diet Yes     Comment: less carbs   • Back Care Yes   • Exercise Yes   • Bike Helmet No     Comment: does not ride bike    • Seat Belt Yes   • Self-Exams Yes   Social History Narrative    Retired from construction (pipe line work)     Social Determinants of Health     Financial Resource Strain:    • Difficulty of Paying Living Expenses:    Food Insecurity:    • Worried About Running Out of Food in the Last Year:    • Ran Out of Food in the Last Year:    Transportation Needs:    • Lack of Transportation (Medical):    • Lack of Transportation (Non-Medical):    Physical Activity:    • Days of Exercise per Week:    • Minutes of Exercise per Session:    Stress:    • Feeling of Stress :    Social Connections:    • Frequency of Communication with Friends and Family:    • Frequency of Social Gatherings with Friends and Family:    • Attends [employer] Services:    • Active Member of Clubs or Organizations:    • Attends Club or Organization Meetings:    • Marital Status:    Intimate Partner Violence:    • Fear of Current or Ex-Partner:    • Emotionally Abused:    •  "Physically Abused:    • Sexually Abused:      Breast Cancer-related family history is not on file.      Review of Systems   Constitutional: Negative for chills and fever.   HENT:        +dental pain   Gastrointestinal: Negative for nausea.   Musculoskeletal: Negative for myalgias.   Neurological: Negative for headaches.              Objective     /78   Pulse 87   Temp 36.1 °C (97 °F)   Resp 16   Ht 1.702 m (5' 7\")   Wt 86.6 kg (191 lb)   SpO2 97%   BMI 29.91 kg/m²      Physical Exam  Vitals and nursing note reviewed.   Constitutional:       Appearance: Normal appearance. He is not ill-appearing.   HENT:      Mouth/Throat:      Mouth: Mucous membranes are moist.      Dentition: Gingival swelling present.     Cardiovascular:      Rate and Rhythm: Normal rate and regular rhythm.      Heart sounds: No murmur heard.     Pulmonary:      Effort: Pulmonary effort is normal.      Breath sounds: Normal breath sounds.   Musculoskeletal:         General: Normal range of motion.   Skin:     General: Skin is warm and dry.   Neurological:      General: No focal deficit present.      Mental Status: He is alert and oriented to person, place, and time.   Psychiatric:         Mood and Affect: Mood normal.                             Assessment & Plan         1. Dental infection  clindamycin (CLEOCIN) 300 MG Cap     Salt water rinses.  otc pain reliever.  clinda x seven days.  Differential diagnosis, natural history, supportive care, and indications for immediate follow-up discussed at length.              "

## 2021-11-24 ENCOUNTER — ANTICOAGULATION MONITORING (OUTPATIENT)
Dept: MEDICAL GROUP | Facility: PHYSICIAN GROUP | Age: 71
End: 2021-11-24

## 2021-11-24 ENCOUNTER — NON-PROVIDER VISIT (OUTPATIENT)
Dept: MEDICAL GROUP | Facility: PHYSICIAN GROUP | Age: 71
End: 2021-11-24
Payer: MEDICARE

## 2021-11-24 DIAGNOSIS — E11.65 TYPE 2 DIABETES MELLITUS WITH HYPERGLYCEMIA, WITH LONG-TERM CURRENT USE OF INSULIN (HCC): ICD-10-CM

## 2021-11-24 DIAGNOSIS — Z79.4 TYPE 2 DIABETES MELLITUS WITH HYPERGLYCEMIA, WITH LONG-TERM CURRENT USE OF INSULIN (HCC): ICD-10-CM

## 2021-11-24 PROCEDURE — 99402 PREV MED CNSL INDIV APPRX 30: CPT | Performed by: FAMILY MEDICINE

## 2021-11-24 RX ORDER — METFORMIN HYDROCHLORIDE 500 MG/1
1000 TABLET, EXTENDED RELEASE ORAL 2 TIMES DAILY
Qty: 400 TABLET | Refills: 0 | Status: SHIPPED | OUTPATIENT
Start: 2021-11-24

## 2021-11-24 NOTE — PROGRESS NOTES
Patient Consult Note    TIME IN: 10:45  TIME OUT: 11:18    Primary care physician: Eugenio Guadarrama P.A.-C.    Reason for consult: Management of Uncontrolled Type 2 Diabetes    HPI:  Carlos Pulido is a 71 y.o. old patient who comes in today for evaluation of above stated problem.    Most Recent HbA1c:   Lab Results   Component Value Date/Time    HBA1C 7.5 (A) 09/01/2021 02:25 PM      Lab Results   Component Value Date/Time    CREATININE 1.30 09/07/2021 08:58 AM              Diabetes Medication History and Current Regimen  Metformin: 1000mg po bid   GLP-1 Agent: Ozempic 1mg sub q weekly  SGLT-2 Inhibitor:  Empagliflozin 25 mg once daily   Basal Insulin: Triseba 35 units at bedtime      Pt has home glucometer and proper testing technique - yes    Pt reports blood sugars:     Other times: pt does not routinely test    Hypoglycemia awareness - yes  Nocturnal hypoglycemia- none  Hypoglycemia:  None    Pt's treatment of Hypoglycemia - n/a  - 15:15 Rule    Current Exercise - minimal   Exercise Goal - pt would benefit from daily dedicated walking 30 minutes daily    Dietary - common  diet          Preventative Management  BP regimen (ACE/ARB) - linsiopril 40mg po daily  ASA - 81mg po daily  Statin - atorva 40mg po daily  Last Retinal Scan - current  Last Foot Exam - current  Last A1c -   Lab Results   Component Value Date/Time    HBA1C 7.5 (A) 09/01/2021 02:25 PM      Last Microalbuminuria - current     updated caregaps    Past Medical History:  Patient Active Problem List    Diagnosis Date Noted   • Diabetic peripheral neuropathy associated with type 2 diabetes mellitus ([facility]) 10/04/2021   • Chronic kidney disease due to type 2 diabetes mellitus ([facility]) 10/04/2021   • Mixed hyperlipidemia 10/04/2021   • Type 2 diabetes mellitus with hyperglycemia, with long-term current use of insulin ([facility]) 03/13/2020   • Muscle cramps 09/10/2019   • Hypertension associated with diabetes ([facility]) 04/16/2013   • Restless legs syndrome  (RLS) 04/16/2013   • PATTI (obstructive sleep apnea) 04/16/2013   • Anxiety 04/16/2013       Past Surgical History:  Past Surgical History:   Procedure Laterality Date   • KNEE REPLACEMENT, TOTAL Right 2011    done in [facility]   • KNEE REPLACEMENT, TOTAL Left 2009    done in [facility]   • RHINOPLASTY         Allergies:  Amoxicillin and Hydrocodone    Social History:  Social History     Socioeconomic History   • Marital status:      Spouse name: Not on file   • Number of children: Not on file   • Years of education: Not on file   • Highest education level: Not on file   Occupational History   • Occupation: retired pipeline worker   Tobacco Use   • Smoking status: Never Smoker   • Smokeless tobacco: Never Used   Vaping Use   • Vaping Use: Never used   Substance and Sexual Activity   • Alcohol use: Yes     Alcohol/week: 0.0 oz     Comment: x2 drinks monthly   • Drug use: Yes     Types: Marijuana     Comment: occasional    • Sexual activity: Yes     Partners: Female     Comment:    Other Topics Concern   •  Service No   • Blood Transfusions No   • Caffeine Concern No   • Occupational Exposure Yes     Comment: asbestos    • Hobby Hazards No   • Sleep Concern Yes   • Stress Concern No   • Weight Concern Yes   • Special Diet Yes     Comment: less carbs   • Back Care Yes   • Exercise Yes   • Bike Helmet No     Comment: does not ride bike    • Seat Belt Yes   • Self-Exams Yes   Social History Narrative    Retired from construction (pipe line work)     Social Determinants of Health     Financial Resource Strain:    • Difficulty of Paying Living Expenses: Not on file   Food Insecurity:    • Worried About Running Out of Food in the Last Year: Not on file   • Ran Out of Food in the Last Year: Not on file   Transportation Needs:    • Lack of Transportation (Medical): Not on file   • Lack of Transportation (Non-Medical): Not on file   Physical Activity:    • Days of Exercise per Week: Not  on file   • Minutes of Exercise per Session: Not on file   Stress:    • Feeling of Stress : Not on file   Social Connections:    • Frequency of Communication with Friends and Family: Not on file   • Frequency of Social Gatherings with Friends and Family: Not on file   • Attends Jew Services: Not on file   • Active Member of Clubs or Organizations: Not on file   • Attends Club or Organization Meetings: Not on file   • Marital Status: Not on file   Intimate Partner Violence:    • Fear of Current or Ex-Partner: Not on file   • Emotionally Abused: Not on file   • Physically Abused: Not on file   • Sexually Abused: Not on file   Housing Stability:    • Unable to Pay for Housing in the Last Year: Not on file   • Number of Places Lived in the Last Year: Not on file   • Unstable Housing in the Last Year: Not on file       Family History:  Family History   Problem Relation Age of Onset   • Diabetes Mother    • Diabetes Father    • Diabetes Sister    • Diabetes Brother    • No Known Problems Brother    • Heart Disease Neg Hx    • Heart Failure Neg Hx        Medications:    Current Outpatient Medications:   •  Empagliflozin (JARDIANCE) 25 MG Tab, Take 1 Tablet by mouth every day., Disp: , Rfl:   •  glucose blood (ACCU-CHEK AMY PLUS) strip, 1 Strip by Other route in the morning, at noon, and at bedtime., Disp: 300 Strip, Rfl: 1  •  hydroCHLOROthiazide (HYDRODIURIL) 25 MG Tab, Take 1 Tablet by mouth every day., Disp: 90 Tablet, Rfl: 1  •  atorvastatin (LIPITOR) 40 MG Tab, Take 1 tablet by mouth once daily, Disp: 100 Tablet, Rfl: 0  •  amLODIPine (NORVASC) 5 MG Tab, Take 1 tablet by mouth once daily, Disp: 90 Tablet, Rfl: 1  •  Insulin Pen Needle 32G X 6 MM Misc, 1 Each every day., Disp: 100 Each, Rfl: 1  •  Insulin Degludec (TRESIBA FLEXTOUCH) 100 UNIT/ML Solution Pen-injector, Inject 30 Units under the skin at bedtime., Disp: 5 Each, Rfl: 5  •  Accu-Chek Softclix Lancets Misc, USE TO CHECK BLOOD SUGAR LEVELS 5 TIMES  "DAILY  **DX CODE E11.65**, Disp: 500 Each, Rfl: 1  •  lisinopril (PRINIVIL) 40 MG tablet, Take 1 tablet by mouth once daily, Disp: 90 tablet, Rfl: 0  •  metFORMIN ER (GLUCOPHAGE XR) 500 MG TABLET SR 24 HR, Take 2 Tablets by mouth 2 times a day., Disp: 360 tablet, Rfl: 3  •  Semaglutide, 1 MG/DOSE, (OZEMPIC, 1 MG/DOSE,) 2 MG/1.5ML Solution Pen-injector, Inject 1 mg under the skin every 7 days., Disp: 2 Each, Rfl: 1  •  aspirin 81 MG tablet, Take 81 mg by mouth every day., Disp: , Rfl:     Labs: Reviewed    Physical Examination:  Vital signs: There were no vitals taken for this visit. There is no height or weight on file to calculate BMI.    Assessment and Plan:    1. DM2  • Pt is currently optimized on Jardiance 25mg po daily,  • Pt is currently optimized on Tresiba 35 units at bedtime  • Pt is currently optimized on Ozempic 1mg sub q weekly  • Pt is currently optimized on Metformin 1000mg po bid  • Pt has selected [employer] plan for 2022 so I will no longer see him.  Strongly advise him to find a PCP NOW so that he may establish care early in January    - Medication changes:  • none     - Lifestyle changes:  • Continue present management.  Pt reports increased activity.  He \"walks behind his attractive neighbor\" 3x weekly at least a mile     No follow-ups on file.    Chrissy Bethea  11/24/21    CC:   Eugenio Guadarrama P.A.-C.    "

## 2021-11-24 NOTE — PROGRESS NOTES
Patient Consult Note     TIME IN: 10:45  TIME OUT: 11:18     Primary care physician: Eugenio Guadarrama P.A.-C.     Reason for consult: Management of Uncontrolled Type 2 Diabetes     HPI:  Carlos Pulido is a 71 y.o. old patient who comes in today for evaluation of above stated problem.     Most Recent HbA1c:         Lab Results   Component Value Date/Time     HBA1C 7.5 (A) 09/01/2021 02:25 PM            Lab Results   Component Value Date/Time     CREATININE 1.30 09/07/2021 08:58 AM                Diabetes Medication History and Current Regimen  Metformin: 1000mg po bid   GLP-1 Agent: Ozempic 1mg sub q weekly  SGLT-2 Inhibitor:  Empagliflozin 25 mg once daily   Basal Insulin: Triseba 35 units at bedtime        Pt has home glucometer and proper testing technique - yes     Pt reports blood sugars:      Other times: pt does not routinely test     Hypoglycemia awareness - yes  Nocturnal hypoglycemia- none  Hypoglycemia:  None     Pt's treatment of Hypoglycemia - n/a  - 15:15 Rule     Current Exercise - minimal   Exercise Goal - pt would benefit from daily dedicated walking 30 minutes daily     Dietary - common  diet              Preventative Management  BP regimen (ACE/ARB) - linsiopril 40mg po daily  ASA - 81mg po daily  Statin - atorva 40mg po daily  Last Retinal Scan - current  Last Foot Exam - current  Last A1c -         Lab Results   Component Value Date/Time     HBA1C 7.5 (A) 09/01/2021 02:25 PM      Last Microalbuminuria - current      updated caregaps     Past Medical History:       Patient Active Problem List     Diagnosis Date Noted   • Diabetic peripheral neuropathy associated with type 2 diabetes mellitus (HCC) 10/04/2021   • Chronic kidney disease due to type 2 diabetes mellitus ([facility]) 10/04/2021   • Mixed hyperlipidemia 10/04/2021   • Type 2 diabetes mellitus with hyperglycemia, with long-term current use of insulin ([facility]) 03/13/2020   • Muscle cramps 09/10/2019   • Hypertension associated with  diabetes (HCC) 04/16/2013   • Restless legs syndrome (RLS) 04/16/2013   • PATTI (obstructive sleep apnea) 04/16/2013   • Anxiety 04/16/2013         Past Surgical History:  Past Surgical History         Past Surgical History:   Procedure Laterality Date   • KNEE REPLACEMENT, TOTAL Right 2011     done in [facility]   • KNEE REPLACEMENT, TOTAL Left 2009     done in [facility]   • RHINOPLASTY                Allergies:  Amoxicillin and Hydrocodone     Social History:  Social History               Socioeconomic History   • Marital status:        Spouse name: Not on file   • Number of children: Not on file   • Years of education: Not on file   • Highest education level: Not on file   Occupational History   • Occupation: retired pipeline worker   Tobacco Use   • Smoking status: Never Smoker   • Smokeless tobacco: Never Used   Vaping Use   • Vaping Use: Never used   Substance and Sexual Activity   • Alcohol use: Yes       Alcohol/week: 0.0 oz       Comment: x2 drinks monthly   • Drug use: Yes       Types: Marijuana       Comment: occasional    • Sexual activity: Yes       Partners: Female       Comment:    Other Topics Concern   •  Service No   • Blood Transfusions No   • Caffeine Concern No   • Occupational Exposure Yes       Comment: asbestos    • Hobby Hazards No   • Sleep Concern Yes   • Stress Concern No   • Weight Concern Yes   • Special Diet Yes       Comment: less carbs   • Back Care Yes   • Exercise Yes   • Bike Helmet No       Comment: does not ride bike    • Seat Belt Yes   • Self-Exams Yes   Social History Narrative     Retired from construction (pipe line work)      Social Determinants of Health          Financial Resource Strain:    • Difficulty of Paying Living Expenses: Not on file   Food Insecurity:    • Worried About Running Out of Food in the Last Year: Not on file   • Ran Out of Food in the Last Year: Not on file   Transportation Needs:    • Lack of Transportation  (Medical): Not on file   • Lack of Transportation (Non-Medical): Not on file   Physical Activity:    • Days of Exercise per Week: Not on file   • Minutes of Exercise per Session: Not on file   Stress:    • Feeling of Stress : Not on file   Social Connections:    • Frequency of Communication with Friends and Family: Not on file   • Frequency of Social Gatherings with Friends and Family: Not on file   • Attends Faith Services: Not on file   • Active Member of Clubs or Organizations: Not on file   • Attends Club or Organization Meetings: Not on file   • Marital Status: Not on file   Intimate Partner Violence:    • Fear of Current or Ex-Partner: Not on file   • Emotionally Abused: Not on file   • Physically Abused: Not on file   • Sexually Abused: Not on file   Housing Stability:    • Unable to Pay for Housing in the Last Year: Not on file   • Number of Places Lived in the Last Year: Not on file   • Unstable Housing in the Last Year: Not on file            Family History:  Family History         Family History   Problem Relation Age of Onset   • Diabetes Mother     • Diabetes Father     • Diabetes Sister     • Diabetes Brother     • No Known Problems Brother     • Heart Disease Neg Hx     • Heart Failure Neg Hx              Medications:     Current Outpatient Medications:   •  Empagliflozin (JARDIANCE) 25 MG Tab, Take 1 Tablet by mouth every day., Disp: , Rfl:   •  glucose blood (ACCU-CHEK AMY PLUS) strip, 1 Strip by Other route in the morning, at noon, and at bedtime., Disp: 300 Strip, Rfl: 1  •  hydroCHLOROthiazide (HYDRODIURIL) 25 MG Tab, Take 1 Tablet by mouth every day., Disp: 90 Tablet, Rfl: 1  •  atorvastatin (LIPITOR) 40 MG Tab, Take 1 tablet by mouth once daily, Disp: 100 Tablet, Rfl: 0  •  amLODIPine (NORVASC) 5 MG Tab, Take 1 tablet by mouth once daily, Disp: 90 Tablet, Rfl: 1  •  Insulin Pen Needle 32G X 6 MM Misc, 1 Each every day., Disp: 100 Each, Rfl: 1  •  Insulin Degludec (TRESIBA FLEXTOUCH) 100  "UNIT/ML Solution Pen-injector, Inject 30 Units under the skin at bedtime., Disp: 5 Each, Rfl: 5  •  Accu-Chek Softclix Lancets Misc, USE TO CHECK BLOOD SUGAR LEVELS 5 TIMES DAILY  **DX CODE E11.65**, Disp: 500 Each, Rfl: 1  •  lisinopril (PRINIVIL) 40 MG tablet, Take 1 tablet by mouth once daily, Disp: 90 tablet, Rfl: 0  •  metFORMIN ER (GLUCOPHAGE XR) 500 MG TABLET SR 24 HR, Take 2 Tablets by mouth 2 times a day., Disp: 360 tablet, Rfl: 3  •  Semaglutide, 1 MG/DOSE, (OZEMPIC, 1 MG/DOSE,) 2 MG/1.5ML Solution Pen-injector, Inject 1 mg under the skin every 7 days., Disp: 2 Each, Rfl: 1  •  aspirin 81 MG tablet, Take 81 mg by mouth every day., Disp: , Rfl:      Labs: Reviewed     Physical Examination:  Vital signs: There were no vitals taken for this visit. There is no height or weight on file to calculate BMI.     Assessment and Plan:     1. DM2  · Pt is currently optimized on Jardiance 25mg po daily,  · Pt is currently optimized on Tresiba 35 units at bedtime  · Pt is currently optimized on Ozempic 1mg sub q weekly  · Pt is currently optimized on Metformin 1000mg po bid  · Pt has selected [employer] plan for 2022 so I will no longer see him.  Strongly advise him to find a PCP NOW so that he may establish care early in January     - Medication changes:  · none      - Lifestyle changes:  · Continue present management.  Pt reports increased activity.  He \"walks behind his attractive neighbor\" 3x weekly at least a mile      No follow-ups on file.     Chrissy Bethea  11/24/21     CC:   Eugenio Guadarrama, SANAZ.    "

## 2021-11-28 DIAGNOSIS — E11.42 DIABETIC PERIPHERAL NEUROPATHY ASSOCIATED WITH TYPE 2 DIABETES MELLITUS (HCC): ICD-10-CM

## 2021-11-29 RX ORDER — GABAPENTIN 300 MG/1
CAPSULE ORAL
Qty: 90 CAPSULE | Refills: 1 | Status: SHIPPED | OUTPATIENT
Start: 2021-11-29

## 2022-10-26 ENCOUNTER — TELEPHONE (OUTPATIENT)
Dept: SCHEDULING | Facility: IMAGING CENTER | Age: 72
End: 2022-10-26

## 2022-10-26 NOTE — TELEPHONE ENCOUNTER
Left Message for patient to call back and make appointment with PCP.     [phone] when patient returns call or schedule an annual or follow up visit.        Attempt # 1

## 2023-03-31 NOTE — PROGRESS NOTES
Complaint: F/u diabetes     Subjective:     Carlos Pulido is a 68 y.o. male here today for follow-up.    Uncontrolled type 2 diabetes mellitus with insulin therapy ([facility])  Could not be seen by Dr. Gabriel, not taking any new medicare patients. BS running 160-17 during day, running 200's in evening. On insulins and metformin and new Jardiance. Tolerating well.    Anxiety  Feels paroxetine helping.    Constipation  Taking Dulcolax daily, still having trouble going. Says he drinks plenty of fluids. No bloating, cramping.    Obesity (BMI 30.0-34.9)  Trouble keeping his weight down. Not getting much exercise.    Meralgia paraesthetica, right  Stopped taking his gabapentin, not requesting any other med.     Having some retrograde ejaculation. No problem with libido or getting an erection.    Doing well on Paxil, less anxiety.    Current medicines (including changes today)  Current Outpatient Prescriptions   Medication Sig Dispense Refill   • insulin NPH (NOVOLIN N RELION) 100 UNIT/ML Suspension Inject 32 Units as instructed 2 Times a Day. 20 mL 6   • insulin regular (NOVOLIN R RELION) 100 Unit/mL Solution Inject 16 Units as instructed 3 times a day before meals. 20 mL 6   • Empagliflozin 10 MG Tab Take 1 Tab by mouth every day. 30 Tab 5   • PARoxetine (PAXIL) 20 MG Tab TAKE ONE TABLET BY MOUTH ONCE DAILY 90 Tab 1   • metFORMIN ER (GLUCOPHAGE XR) 500 MG TABLET SR 24 HR TAKE TWO TABLETS BY MOUTH TWICE DAILY 360 Tab 1   • lisinopril (PRINIVIL, ZESTRIL) 40 MG tablet TAKE ONE TABLET BY MOUTH ONCE DAILY 90 Tab 3   • hydrochlorothiazide (HYDRODIURIL) 25 MG Tab TAKE ONE TABLET BY MOUTH ONCE DAILY 90 Tab 3   • atorvastatin (LIPITOR) 40 MG Tab TAKE ONE TABLET BY MOUTH ONCE DAILY 90 Tab 3   • amlodipine (NORVASC) 5 MG Tab TAKE ONE TABLET BY MOUTH ONCE DAILY 90 Tab 3   • aspirin 81 MG tablet Take 81 mg by mouth every day.     • Lancets Misc Lancets order:   Accucheck Soft Clix lancets  Testing blood sugars 5 times per day for  [Today's Date] : [unfilled] "insulin adjustment. 100 Each 1   • glucose blood (ACCU-CHEK AMY PLUS) strip USE ONE STRIP TO CHECK GLUCOSE 4 TIMES DAILY **E11.65** 150 Strip 2   • RELION INSULIN SYRINGE 31G X 15/64\" 0.3 ML Misc Inject 1 Each as instructed 5 Times a Day. 150 Each 6   • Insulin Pen Needle (BD PEN NEEDLE DAVIE U/F) 32G X 4 MM Misc For insulin shots 4 times a day 150 Each 6   • Diclofenac Sodium (VOLTAREN) 1 % Gel Apply 4 g to skin as directed 3 times a day as needed. 1 Tube 0   • lidocaine (LIDODERM) 5 % Patch Apply 1 Patch to skin as directed every 24 hours. as needed for nerve pain. 30 Patch 2   • Insulin Syringe-Needle U-100 (INSULIN SYRINGE .3CC/31GX5/16\") 31G X 5/16\" 0.3 ML Misc 1 Syringe by Does not apply route 5 Times a Day. 200 Each 5     No current facility-administered medications for this visit.      He  has a past medical history of Anxiety (4/16/2013); Hypertension (4/16/2013); Low testosterone (4/16/2013); Meralgia paraesthetica, right (12/18/2017); PATTI (obstructive sleep apnea) (4/16/2013); and Restless legs syndrome (RLS) (4/16/2013).    Health Maintenance: needs microalbumin check, not fating today for lipid profile.      Allergies: Amoxicillin and Hydrocodone    Current Outpatient Prescriptions Ordered in [facility]   Medication Sig Dispense Refill   • insulin NPH (NOVOLIN N RELION) 100 UNIT/ML Suspension Inject 32 Units as instructed 2 Times a Day. 20 mL 6   • insulin regular (NOVOLIN R RELION) 100 Unit/mL Solution Inject 16 Units as instructed 3 times a day before meals. 20 mL 6   • Empagliflozin 10 MG Tab Take 1 Tab by mouth every day. 30 Tab 5   • PARoxetine (PAXIL) 20 MG Tab TAKE ONE TABLET BY MOUTH ONCE DAILY 90 Tab 1   • metFORMIN ER (GLUCOPHAGE XR) 500 MG TABLET SR 24 HR TAKE TWO TABLETS BY MOUTH TWICE DAILY 360 Tab 1   • lisinopril (PRINIVIL, ZESTRIL) 40 MG tablet TAKE ONE TABLET BY MOUTH ONCE DAILY 90 Tab 3   • hydrochlorothiazide (HYDRODIURIL) 25 MG Tab TAKE ONE TABLET BY MOUTH ONCE DAILY 90 Tab 3   • " [FreeTextEntry1] : Normal Sinus Rhythm\par Normal Axis\par QTc 482-487 ms\par  "atorvastatin (LIPITOR) 40 MG Tab TAKE ONE TABLET BY MOUTH ONCE DAILY 90 Tab 3   • amlodipine (NORVASC) 5 MG Tab TAKE ONE TABLET BY MOUTH ONCE DAILY 90 Tab 3   • aspirin 81 MG tablet Take 81 mg by mouth every day.     • Lancets Misc Lancets order:   Accucheck Soft Clix lancets  Testing blood sugars 5 times per day for insulin adjustment. 100 Each 1   • glucose blood (ACCU-CHEK AMY PLUS) strip USE ONE STRIP TO CHECK GLUCOSE 4 TIMES DAILY **E11.65** 150 Strip 2   • RELION INSULIN SYRINGE 31G X 15/64\" 0.3 ML Misc Inject 1 Each as instructed 5 Times a Day. 150 Each 6   • Insulin Pen Needle (BD PEN NEEDLE DAVIE U/F) 32G X 4 MM Misc For insulin shots 4 times a day 150 Each 6   • Diclofenac Sodium (VOLTAREN) 1 % Gel Apply 4 g to skin as directed 3 times a day as needed. 1 Tube 0   • lidocaine (LIDODERM) 5 % Patch Apply 1 Patch to skin as directed every 24 hours. as needed for nerve pain. 30 Patch 2   • Insulin Syringe-Needle U-100 (INSULIN SYRINGE .3CC/31GX5/16\") 31G X 5/16\" 0.3 ML Misc 1 Syringe by Does not apply route 5 Times a Day. 200 Each 5     No current Epic-ordered facility-administered medications on file.        Past Medical History:   Diagnosis Date   • Anxiety 4/16/2013    As needed for anxiety   • Hypertension 4/16/2013   • Low testosterone 4/16/2013   • Meralgia paraesthetica, right 12/18/2017   • PATTI (obstructive sleep apnea) 4/16/2013    Cannot tolerate sleep apnea   • Restless legs syndrome (RLS) 4/16/2013       Past Surgical History:   Procedure Laterality Date   • KNEE REPLACEMENT, TOTAL Right 2011    done in [facility]   • KNEE REPLACEMENT, TOTAL Left 2009    done in [facility]   • RHINOPLASTY         Social History   Substance Use Topics   • Smoking status: Never Smoker   • Smokeless tobacco: Never Used   • Alcohol use 0.0 oz/week      Comment: x2 drinks monthly       Social History     Social History Narrative    Retired from construction (pipe line work)       Family History " [de-identified] : 4/27/2022 "  Problem Relation Age of Onset   • Diabetes Mother    • Diabetes Father    • Diabetes Sister    • Diabetes Brother    • No Known Problems Brother    • Heart Disease Neg Hx    • Heart Failure Neg Hx          ROS Positive for pain in knees, constipation  Patient denies any fever, chills, unintentional weight gain/loss, fatigue, stroke symptoms, dizziness, headache, nasal congestion, sore-throat, cough, heartburn, chest pain, difficulty breathing, abdominal discomfort, diarrhea, burning with urination or frequency, joint pain, skin rashes, depression or anxiety.       Objective:     Blood pressure 124/68, pulse 95, temperature 36.6 °C (97.9 °F), resp. rate 16, height 1.702 m (5' 7\"), weight 93.9 kg (207 lb), SpO2 95 %. Body mass index is 32.42 kg/m².   Physical Exam:  Constitutional: Alert, no distress.  Psych: Alert and oriented x3, appropriate affect and mood.        Assessment and Plan:   The following treatment plan was discussed    1. Slow transit constipation  Chronic problem. Recommend Miralax as directed. Drink more fluids, get more exercise.    2. Uncontrolled type 2 diabetes mellitus with insulin therapy (HCC)  Chronic problem, uncontrolled. No change meds. Referral to new Endo at [facility]. Will notify with results.  - REFERRAL TO ENDOCRINOLOGY  - MICROALBUMIN CREAT RATIO URINE; Future  - COMP METABOLIC PANEL; Future  - CBC WITH DIFFERENTIAL; Future    3. Obesity (BMI 30.0-34.9)  Chronic problem. Needs to get more exercise walking outdoors, make changes to his diet, eat lighter.         Followup: Return in about 3 months (around 11/17/2018).    Please note that this dictation was created using voice recognition software. I have made every reasonable attempt to correct obvious errors, but I expect that there are errors of grammar and possibly content that I did not discover before finalizing the note.           " [FreeTextEntry2] : Summary:\par 1. Normal study.\par 2. Normal left ventricular size, morphology and systolic function.\par 3. Trivial tricuspid valve regurgitation, peak systolic instantaneous gradient 16.4 mmHg.\par 4. Trivial pulmonary valve regurgitation.\par 5. No pericardial effusion\par FERNANDA ALTMAN [de-identified] : The results of the 24-hour Holter monitor placed at last visit reviewed in detail today. The heart rate ranged from  beats per minute with an average of 65  beats per minute. The predominant rhythm was normal sinus rhythm alternating with sinus bradycardia, sinus tachycardia and sinus arrhythmia. There was one supraventricular premature beat. There were 2 ventricular premature beats. There were no symptoms reported during the monitoring period.

## 2024-03-19 NOTE — TELEPHONE ENCOUNTER
Received refill request for Amlodipine 5mg tablets   Last PCP visit: 09/10/2019 (Dr. Pantoja), patient has NP apt scheduled with Dr. Finnegan for 09/15/2020   N/A no concerns